# Patient Record
Sex: MALE | Race: WHITE | Employment: OTHER
[De-identification: names, ages, dates, MRNs, and addresses within clinical notes are randomized per-mention and may not be internally consistent; named-entity substitution may affect disease eponyms.]

---

## 2017-03-29 ENCOUNTER — TELEPHONE (OUTPATIENT)
Dept: CASE MANAGEMENT | Age: 77
End: 2017-03-29

## 2017-06-28 ENCOUNTER — TELEPHONE (OUTPATIENT)
Dept: CASE MANAGEMENT | Age: 77
End: 2017-06-28

## 2017-08-29 ENCOUNTER — TELEPHONE (OUTPATIENT)
Dept: CASE MANAGEMENT | Age: 77
End: 2017-08-29

## 2017-09-21 ENCOUNTER — TELEPHONE (OUTPATIENT)
Dept: CASE MANAGEMENT | Age: 77
End: 2017-09-21

## 2018-11-07 ENCOUNTER — HOSPITAL ENCOUNTER (EMERGENCY)
Age: 78
Discharge: HOME | End: 2018-11-07
Attending: EMERGENCY MEDICINE
Payer: MEDICARE

## 2018-11-07 ENCOUNTER — APPOINTMENT (OUTPATIENT)
Dept: GENERAL RADIOLOGY | Age: 78
End: 2018-11-07
Payer: MEDICARE

## 2018-11-07 VITALS
DIASTOLIC BLOOD PRESSURE: 58 MMHG | HEART RATE: 66 BPM | SYSTOLIC BLOOD PRESSURE: 130 MMHG | TEMPERATURE: 98.3 F | BODY MASS INDEX: 24.04 KG/M2 | HEIGHT: 67 IN | OXYGEN SATURATION: 94 % | RESPIRATION RATE: 20 BRPM | WEIGHT: 153.19 LBS

## 2018-11-07 DIAGNOSIS — F17.200 CURRENT EVERY DAY SMOKER: ICD-10-CM

## 2018-11-07 DIAGNOSIS — J06.9 ACUTE UPPER RESPIRATORY INFECTION: Primary | ICD-10-CM

## 2018-11-07 LAB
ALBUMIN SERPL-MCNC: 4 G/DL (ref 3.5–5.2)
ALP BLD-CCNC: 67 U/L (ref 40–129)
ALT SERPL-CCNC: 8 U/L (ref 0–40)
ANION GAP SERPL CALCULATED.3IONS-SCNC: 11 MMOL/L (ref 7–16)
AST SERPL-CCNC: 14 U/L (ref 0–39)
BASOPHILS ABSOLUTE: 0.03 E9/L (ref 0–0.2)
BASOPHILS RELATIVE PERCENT: 0.5 % (ref 0–2)
BILIRUB SERPL-MCNC: 0.3 MG/DL (ref 0–1.2)
BUN BLDV-MCNC: 14 MG/DL (ref 8–23)
CALCIUM SERPL-MCNC: 8.8 MG/DL (ref 8.6–10.2)
CHLORIDE BLD-SCNC: 99 MMOL/L (ref 98–107)
CO2: 26 MMOL/L (ref 22–29)
CREAT SERPL-MCNC: 1.5 MG/DL (ref 0.7–1.2)
EKG ATRIAL RATE: 92 BPM
EKG P AXIS: 60 DEGREES
EKG P-R INTERVAL: 164 MS
EKG Q-T INTERVAL: 348 MS
EKG QRS DURATION: 84 MS
EKG QTC CALCULATION (BAZETT): 430 MS
EKG R AXIS: 6 DEGREES
EKG T AXIS: 44 DEGREES
EKG VENTRICULAR RATE: 92 BPM
EOSINOPHILS ABSOLUTE: 0.01 E9/L (ref 0.05–0.5)
EOSINOPHILS RELATIVE PERCENT: 0.2 % (ref 0–6)
GFR AFRICAN AMERICAN: 55
GFR NON-AFRICAN AMERICAN: 45 ML/MIN/1.73
GLUCOSE BLD-MCNC: 125 MG/DL (ref 74–99)
HCT VFR BLD CALC: 46.3 % (ref 37–54)
HEMOGLOBIN: 15.6 G/DL (ref 12.5–16.5)
IMMATURE GRANULOCYTES #: 0.01 E9/L
IMMATURE GRANULOCYTES %: 0.2 % (ref 0–5)
LACTIC ACID: 1.1 MMOL/L (ref 0.5–2.2)
LYMPHOCYTES ABSOLUTE: 0.63 E9/L (ref 1.5–4)
LYMPHOCYTES RELATIVE PERCENT: 9.9 % (ref 20–42)
MCH RBC QN AUTO: 30.8 PG (ref 26–35)
MCHC RBC AUTO-ENTMCNC: 33.7 % (ref 32–34.5)
MCV RBC AUTO: 91.3 FL (ref 80–99.9)
MONOCYTES ABSOLUTE: 0.68 E9/L (ref 0.1–0.95)
MONOCYTES RELATIVE PERCENT: 10.7 % (ref 2–12)
NEUTROPHILS ABSOLUTE: 5.02 E9/L (ref 1.8–7.3)
NEUTROPHILS RELATIVE PERCENT: 78.5 % (ref 43–80)
PDW BLD-RTO: 12.7 FL (ref 11.5–15)
PLATELET # BLD: 192 E9/L (ref 130–450)
PMV BLD AUTO: 9.1 FL (ref 7–12)
POTASSIUM SERPL-SCNC: 3.8 MMOL/L (ref 3.5–5)
PRO-BNP: 405 PG/ML (ref 0–450)
RBC # BLD: 5.07 E12/L (ref 3.8–5.8)
SODIUM BLD-SCNC: 136 MMOL/L (ref 132–146)
TOTAL PROTEIN: 6.3 G/DL (ref 6.4–8.3)
TROPONIN: <0.01 NG/ML (ref 0–0.03)
WBC # BLD: 6.4 E9/L (ref 4.5–11.5)

## 2018-11-07 PROCEDURE — 84484 ASSAY OF TROPONIN QUANT: CPT

## 2018-11-07 PROCEDURE — 87040 BLOOD CULTURE FOR BACTERIA: CPT

## 2018-11-07 PROCEDURE — 80053 COMPREHEN METABOLIC PANEL: CPT

## 2018-11-07 PROCEDURE — 99285 EMERGENCY DEPT VISIT HI MDM: CPT

## 2018-11-07 PROCEDURE — 94664 DEMO&/EVAL PT USE INHALER: CPT

## 2018-11-07 PROCEDURE — 6360000002 HC RX W HCPCS: Performed by: EMERGENCY MEDICINE

## 2018-11-07 PROCEDURE — 83880 ASSAY OF NATRIURETIC PEPTIDE: CPT

## 2018-11-07 PROCEDURE — 6370000000 HC RX 637 (ALT 250 FOR IP): Performed by: EMERGENCY MEDICINE

## 2018-11-07 PROCEDURE — 96374 THER/PROPH/DIAG INJ IV PUSH: CPT

## 2018-11-07 PROCEDURE — 71045 X-RAY EXAM CHEST 1 VIEW: CPT

## 2018-11-07 PROCEDURE — 94640 AIRWAY INHALATION TREATMENT: CPT

## 2018-11-07 PROCEDURE — 2580000003 HC RX 258: Performed by: EMERGENCY MEDICINE

## 2018-11-07 PROCEDURE — 85025 COMPLETE CBC W/AUTO DIFF WBC: CPT

## 2018-11-07 PROCEDURE — 83605 ASSAY OF LACTIC ACID: CPT

## 2018-11-07 RX ORDER — IPRATROPIUM BROMIDE AND ALBUTEROL SULFATE 2.5; .5 MG/3ML; MG/3ML
1 SOLUTION RESPIRATORY (INHALATION)
Status: COMPLETED | OUTPATIENT
Start: 2018-11-07 | End: 2018-11-07

## 2018-11-07 RX ORDER — METHYLPREDNISOLONE SODIUM SUCCINATE 125 MG/2ML
125 INJECTION, POWDER, LYOPHILIZED, FOR SOLUTION INTRAMUSCULAR; INTRAVENOUS ONCE
Status: COMPLETED | OUTPATIENT
Start: 2018-11-07 | End: 2018-11-07

## 2018-11-07 RX ORDER — ALBUTEROL SULFATE 90 UG/1
2 AEROSOL, METERED RESPIRATORY (INHALATION) EVERY 4 HOURS PRN
Qty: 1 INHALER | Refills: 0 | Status: SHIPPED | OUTPATIENT
Start: 2018-11-07 | End: 2018-12-05

## 2018-11-07 RX ORDER — SODIUM CHLORIDE 0.9 % (FLUSH) 0.9 %
SYRINGE (ML) INJECTION
Status: DISCONTINUED
Start: 2018-11-07 | End: 2018-11-07 | Stop reason: HOSPADM

## 2018-11-07 RX ORDER — PREDNISONE 20 MG/1
40 TABLET ORAL DAILY
Qty: 10 TABLET | Refills: 0 | Status: SHIPPED | OUTPATIENT
Start: 2018-11-07 | End: 2018-11-12

## 2018-11-07 RX ORDER — DOXYCYCLINE HYCLATE 100 MG
100 TABLET ORAL 2 TIMES DAILY
Qty: 20 TABLET | Refills: 0 | Status: SHIPPED | OUTPATIENT
Start: 2018-11-07 | End: 2018-11-17

## 2018-11-07 RX ORDER — 0.9 % SODIUM CHLORIDE 0.9 %
500 INTRAVENOUS SOLUTION INTRAVENOUS ONCE
Status: COMPLETED | OUTPATIENT
Start: 2018-11-07 | End: 2018-11-07

## 2018-11-07 RX ADMIN — METHYLPREDNISOLONE SODIUM SUCCINATE 125 MG: 125 INJECTION, POWDER, FOR SOLUTION INTRAMUSCULAR; INTRAVENOUS at 12:01

## 2018-11-07 RX ADMIN — IPRATROPIUM BROMIDE AND ALBUTEROL SULFATE 1 AMPULE: .5; 3 SOLUTION RESPIRATORY (INHALATION) at 12:03

## 2018-11-07 RX ADMIN — IPRATROPIUM BROMIDE AND ALBUTEROL SULFATE 1 AMPULE: .5; 3 SOLUTION RESPIRATORY (INHALATION) at 12:02

## 2018-11-07 RX ADMIN — SODIUM CHLORIDE 500 ML: 9 INJECTION, SOLUTION INTRAVENOUS at 11:53

## 2018-11-07 RX ADMIN — IPRATROPIUM BROMIDE AND ALBUTEROL SULFATE 1 AMPULE: .5; 3 SOLUTION RESPIRATORY (INHALATION) at 12:04

## 2018-11-07 NOTE — ED PROVIDER NOTES
no known allergies. Physical Exam           ED Triage Vitals [11/07/18 1036]   BP Temp Temp Source Pulse Resp SpO2 Height Weight   104/76 98.9 °F (37.2 °C) Oral 90 14 94 % 5' 7\" (1.702 m) 153 lb 3 oz (69.5 kg)      Oxygen Saturation Interpretation: Normal.    Constitutional:  Alert, development consistent with age. HEENT:  NC/NT. Airway patent. Neck:  Normal ROM. Supple. Respiratory:  Breath sounds: equal bilaterally. Lung sounds: rhonchi- scattered. CV:  Regular rate and rhythm, normal heart sounds, without pathological murmurs, ectopy, gallops, or rubs. .  GI:  Abdomen Soft, nontender, good bowel sounds. No firm or pulsatile mass. Integument:  Normal turgor. Warm, dry, without visible rash. Lymphatic:  Edema:  non-pitting Bilateral lower extremity(s). Neurological:  Oriented. Motor functions intact.     Lab / Imaging Results   (All laboratory and radiology results have been personally reviewed by myself)  Labs:  Results for orders placed or performed during the hospital encounter of 11/07/18   CBC Auto Differential   Result Value Ref Range    WBC 6.4 4.5 - 11.5 E9/L    RBC 5.07 3.80 - 5.80 E12/L    Hemoglobin 15.6 12.5 - 16.5 g/dL    Hematocrit 46.3 37.0 - 54.0 %    MCV 91.3 80.0 - 99.9 fL    MCH 30.8 26.0 - 35.0 pg    MCHC 33.7 32.0 - 34.5 %    RDW 12.7 11.5 - 15.0 fL    Platelets 454 894 - 121 E9/L    MPV 9.1 7.0 - 12.0 fL    Neutrophils % 78.5 43.0 - 80.0 %    Immature Granulocytes % 0.2 0.0 - 5.0 %    Lymphocytes % 9.9 (L) 20.0 - 42.0 %    Monocytes % 10.7 2.0 - 12.0 %    Eosinophils % 0.2 0.0 - 6.0 %    Basophils % 0.5 0.0 - 2.0 %    Neutrophils # 5.02 1.80 - 7.30 E9/L    Immature Granulocytes # 0.01 E9/L    Lymphocytes # 0.63 (L) 1.50 - 4.00 E9/L    Monocytes # 0.68 0.10 - 0.95 E9/L    Eosinophils # 0.01 (L) 0.05 - 0.50 E9/L    Basophils # 0.03 0.00 - 0.20 E9/L   Comprehensive Metabolic Panel   Result Value Ref Range    Sodium 136 132 - 146 mmol/L    Potassium 3.8 3.5 - 5.0 mmol/L

## 2018-11-07 NOTE — ED NOTES
Discharged. Stable to exit with all belongings. States he is feeling much better.  Knows warning signs of when to return      Cole Pichardo RN  11/07/18 0718

## 2018-11-12 LAB — CULTURE, BLOOD 2: NORMAL

## 2019-03-05 ENCOUNTER — APPOINTMENT (OUTPATIENT)
Dept: CT IMAGING | Age: 79
DRG: 392 | End: 2019-03-05
Payer: MEDICARE

## 2019-03-05 ENCOUNTER — HOSPITAL ENCOUNTER (INPATIENT)
Age: 79
LOS: 2 days | Discharge: HOME | DRG: 392 | End: 2019-03-07
Attending: EMERGENCY MEDICINE | Admitting: INTERNAL MEDICINE
Payer: MEDICARE

## 2019-03-05 ENCOUNTER — APPOINTMENT (OUTPATIENT)
Dept: GENERAL RADIOLOGY | Age: 79
DRG: 392 | End: 2019-03-05
Payer: MEDICARE

## 2019-03-05 DIAGNOSIS — E86.0 DEHYDRATION: Primary | ICD-10-CM

## 2019-03-05 DIAGNOSIS — R19.7 NAUSEA VOMITING AND DIARRHEA: ICD-10-CM

## 2019-03-05 DIAGNOSIS — R11.2 NAUSEA VOMITING AND DIARRHEA: ICD-10-CM

## 2019-03-05 DIAGNOSIS — R42 ORTHOSTATIC DIZZINESS: ICD-10-CM

## 2019-03-05 PROBLEM — N18.30 CKD (CHRONIC KIDNEY DISEASE) STAGE 3, GFR 30-59 ML/MIN (HCC): Chronic | Status: ACTIVE | Noted: 2019-03-05

## 2019-03-05 PROBLEM — K52.9 GASTROENTERITIS: Status: ACTIVE | Noted: 2019-03-05

## 2019-03-05 PROBLEM — E03.9 ACQUIRED HYPOTHYROIDISM: Chronic | Status: ACTIVE | Noted: 2019-03-05

## 2019-03-05 LAB
ALBUMIN SERPL-MCNC: 4.2 G/DL (ref 3.5–5.2)
ALP BLD-CCNC: 73 U/L (ref 40–129)
ALT SERPL-CCNC: 9 U/L (ref 0–40)
ANION GAP SERPL CALCULATED.3IONS-SCNC: 12 MMOL/L (ref 7–16)
AST SERPL-CCNC: 17 U/L (ref 0–39)
BACTERIA: ABNORMAL /HPF
BASOPHILS ABSOLUTE: 0.02 E9/L (ref 0–0.2)
BASOPHILS RELATIVE PERCENT: 0.3 % (ref 0–2)
BILIRUB SERPL-MCNC: 0.7 MG/DL (ref 0–1.2)
BILIRUBIN URINE: NEGATIVE
BLOOD, URINE: ABNORMAL
BUN BLDV-MCNC: 19 MG/DL (ref 8–23)
CALCIUM SERPL-MCNC: 8.8 MG/DL (ref 8.6–10.2)
CHLORIDE BLD-SCNC: 100 MMOL/L (ref 98–107)
CLARITY: CLEAR
CO2: 24 MMOL/L (ref 22–29)
COLOR: YELLOW
CREAT SERPL-MCNC: 1.4 MG/DL (ref 0.7–1.2)
EOSINOPHILS ABSOLUTE: 0.01 E9/L (ref 0.05–0.5)
EOSINOPHILS RELATIVE PERCENT: 0.1 % (ref 0–6)
GFR AFRICAN AMERICAN: 59
GFR NON-AFRICAN AMERICAN: 49 ML/MIN/1.73
GLUCOSE BLD-MCNC: 140 MG/DL (ref 74–99)
GLUCOSE URINE: NEGATIVE MG/DL
HCT VFR BLD CALC: 48.7 % (ref 37–54)
HEMOGLOBIN: 16.2 G/DL (ref 12.5–16.5)
IMMATURE GRANULOCYTES #: 0.05 E9/L
IMMATURE GRANULOCYTES %: 0.6 % (ref 0–5)
INFLUENZA A BY PCR: NOT DETECTED
INFLUENZA B BY PCR: NOT DETECTED
INR BLD: 1
KETONES, URINE: NEGATIVE MG/DL
LACTIC ACID: 1.1 MMOL/L (ref 0.5–2.2)
LEUKOCYTE ESTERASE, URINE: NEGATIVE
LIPASE: 18 U/L (ref 13–60)
LYMPHOCYTES ABSOLUTE: 0.55 E9/L (ref 1.5–4)
LYMPHOCYTES RELATIVE PERCENT: 6.9 % (ref 20–42)
MCH RBC QN AUTO: 30.3 PG (ref 26–35)
MCHC RBC AUTO-ENTMCNC: 33.3 % (ref 32–34.5)
MCV RBC AUTO: 91.2 FL (ref 80–99.9)
MONOCYTES ABSOLUTE: 0.44 E9/L (ref 0.1–0.95)
MONOCYTES RELATIVE PERCENT: 5.5 % (ref 2–12)
NEUTROPHILS ABSOLUTE: 6.92 E9/L (ref 1.8–7.3)
NEUTROPHILS RELATIVE PERCENT: 86.6 % (ref 43–80)
NITRITE, URINE: NEGATIVE
PDW BLD-RTO: 13.4 FL (ref 11.5–15)
PH UA: 5.5 (ref 5–9)
PLATELET # BLD: 237 E9/L (ref 130–450)
PMV BLD AUTO: 9 FL (ref 7–12)
POTASSIUM REFLEX MAGNESIUM: 3.7 MMOL/L (ref 3.5–5)
PROTEIN UA: NEGATIVE MG/DL
PROTHROMBIN TIME: 11.2 SEC (ref 9.3–12.4)
RBC # BLD: 5.34 E12/L (ref 3.8–5.8)
RBC # BLD: NORMAL 10*6/UL
RBC UA: ABNORMAL /HPF (ref 0–2)
SODIUM BLD-SCNC: 136 MMOL/L (ref 132–146)
SPECIFIC GRAVITY UA: 1.02 (ref 1–1.03)
TOTAL PROTEIN: 6.7 G/DL (ref 6.4–8.3)
TROPONIN: <0.01 NG/ML (ref 0–0.03)
UROBILINOGEN, URINE: 0.2 E.U./DL
WBC # BLD: 8 E9/L (ref 4.5–11.5)
WBC UA: ABNORMAL /HPF (ref 0–5)

## 2019-03-05 PROCEDURE — 83605 ASSAY OF LACTIC ACID: CPT

## 2019-03-05 PROCEDURE — 36415 COLL VENOUS BLD VENIPUNCTURE: CPT

## 2019-03-05 PROCEDURE — 85610 PROTHROMBIN TIME: CPT

## 2019-03-05 PROCEDURE — 87040 BLOOD CULTURE FOR BACTERIA: CPT

## 2019-03-05 PROCEDURE — 84484 ASSAY OF TROPONIN QUANT: CPT

## 2019-03-05 PROCEDURE — 2580000003 HC RX 258: Performed by: STUDENT IN AN ORGANIZED HEALTH CARE EDUCATION/TRAINING PROGRAM

## 2019-03-05 PROCEDURE — 80053 COMPREHEN METABOLIC PANEL: CPT

## 2019-03-05 PROCEDURE — 96361 HYDRATE IV INFUSION ADD-ON: CPT

## 2019-03-05 PROCEDURE — 99285 EMERGENCY DEPT VISIT HI MDM: CPT

## 2019-03-05 PROCEDURE — 74177 CT ABD & PELVIS W/CONTRAST: CPT

## 2019-03-05 PROCEDURE — 6370000000 HC RX 637 (ALT 250 FOR IP): Performed by: INTERNAL MEDICINE

## 2019-03-05 PROCEDURE — 81001 URINALYSIS AUTO W/SCOPE: CPT

## 2019-03-05 PROCEDURE — 87502 INFLUENZA DNA AMP PROBE: CPT

## 2019-03-05 PROCEDURE — 96360 HYDRATION IV INFUSION INIT: CPT

## 2019-03-05 PROCEDURE — 83690 ASSAY OF LIPASE: CPT

## 2019-03-05 PROCEDURE — 2580000003 HC RX 258: Performed by: INTERNAL MEDICINE

## 2019-03-05 PROCEDURE — 85025 COMPLETE CBC W/AUTO DIFF WBC: CPT

## 2019-03-05 PROCEDURE — 71045 X-RAY EXAM CHEST 1 VIEW: CPT

## 2019-03-05 PROCEDURE — 93005 ELECTROCARDIOGRAM TRACING: CPT | Performed by: STUDENT IN AN ORGANIZED HEALTH CARE EDUCATION/TRAINING PROGRAM

## 2019-03-05 PROCEDURE — 1200000000 HC SEMI PRIVATE

## 2019-03-05 PROCEDURE — 6360000004 HC RX CONTRAST MEDICATION: Performed by: RADIOLOGY

## 2019-03-05 RX ORDER — VARENICLINE TARTRATE 0.5 MG/1
1 TABLET, FILM COATED ORAL 2 TIMES DAILY
Status: DISCONTINUED | OUTPATIENT
Start: 2019-03-05 | End: 2019-03-07 | Stop reason: HOSPADM

## 2019-03-05 RX ORDER — SODIUM CHLORIDE 0.9 % (FLUSH) 0.9 %
10 SYRINGE (ML) INJECTION PRN
Status: DISCONTINUED | OUTPATIENT
Start: 2019-03-05 | End: 2019-03-07 | Stop reason: HOSPADM

## 2019-03-05 RX ORDER — DOXAZOSIN MESYLATE 4 MG/1
4 TABLET ORAL DAILY
Status: DISCONTINUED | OUTPATIENT
Start: 2019-03-06 | End: 2019-03-06

## 2019-03-05 RX ORDER — 0.9 % SODIUM CHLORIDE 0.9 %
30 INTRAVENOUS SOLUTION INTRAVENOUS ONCE
Status: COMPLETED | OUTPATIENT
Start: 2019-03-05 | End: 2019-03-05

## 2019-03-05 RX ORDER — ONDANSETRON 2 MG/ML
4 INJECTION INTRAMUSCULAR; INTRAVENOUS EVERY 6 HOURS PRN
Status: DISCONTINUED | OUTPATIENT
Start: 2019-03-05 | End: 2019-03-07 | Stop reason: HOSPADM

## 2019-03-05 RX ORDER — SODIUM CHLORIDE 9 MG/ML
INJECTION, SOLUTION INTRAVENOUS ONCE
Status: COMPLETED | OUTPATIENT
Start: 2019-03-05 | End: 2019-03-05

## 2019-03-05 RX ORDER — ACETAMINOPHEN 325 MG/1
650 TABLET ORAL EVERY 4 HOURS PRN
Status: DISCONTINUED | OUTPATIENT
Start: 2019-03-05 | End: 2019-03-07 | Stop reason: HOSPADM

## 2019-03-05 RX ORDER — LEVOTHYROXINE SODIUM 0.05 MG/1
50 TABLET ORAL DAILY
Status: DISCONTINUED | OUTPATIENT
Start: 2019-03-06 | End: 2019-03-07 | Stop reason: HOSPADM

## 2019-03-05 RX ORDER — ASPIRIN 81 MG/1
81 TABLET ORAL DAILY
Status: DISCONTINUED | OUTPATIENT
Start: 2019-03-06 | End: 2019-03-07 | Stop reason: HOSPADM

## 2019-03-05 RX ORDER — SODIUM CHLORIDE 0.9 % (FLUSH) 0.9 %
10 SYRINGE (ML) INJECTION EVERY 12 HOURS SCHEDULED
Status: DISCONTINUED | OUTPATIENT
Start: 2019-03-05 | End: 2019-03-07 | Stop reason: HOSPADM

## 2019-03-05 RX ORDER — SODIUM CHLORIDE 9 MG/ML
INJECTION, SOLUTION INTRAVENOUS CONTINUOUS
Status: ACTIVE | OUTPATIENT
Start: 2019-03-05 | End: 2019-03-06

## 2019-03-05 RX ADMIN — SODIUM CHLORIDE: 9 INJECTION, SOLUTION INTRAVENOUS at 20:40

## 2019-03-05 RX ADMIN — IOPAMIDOL 110 ML: 755 INJECTION, SOLUTION INTRAVENOUS at 19:28

## 2019-03-05 RX ADMIN — SODIUM CHLORIDE: 9 INJECTION, SOLUTION INTRAVENOUS at 23:02

## 2019-03-05 RX ADMIN — SODIUM CHLORIDE 2028 ML: 9 INJECTION, SOLUTION INTRAVENOUS at 17:43

## 2019-03-05 ASSESSMENT — ENCOUNTER SYMPTOMS
CONSTIPATION: 0
DIARRHEA: 1
VOMITING: 1
WHEEZING: 0
NAUSEA: 1
COLOR CHANGE: 0
ABDOMINAL PAIN: 0
SHORTNESS OF BREATH: 0
COUGH: 1

## 2019-03-05 ASSESSMENT — PAIN SCALES - GENERAL: PAINLEVEL_OUTOF10: 0

## 2019-03-06 PROBLEM — N17.9 AKI (ACUTE KIDNEY INJURY) (HCC): Status: ACTIVE | Noted: 2019-03-06

## 2019-03-06 PROBLEM — I73.9 PVD (PERIPHERAL VASCULAR DISEASE) (HCC): Chronic | Status: ACTIVE | Noted: 2019-03-06

## 2019-03-06 LAB
ALBUMIN SERPL-MCNC: 3.4 G/DL (ref 3.5–5.2)
ALP BLD-CCNC: 54 U/L (ref 40–129)
ALT SERPL-CCNC: 8 U/L (ref 0–40)
ANION GAP SERPL CALCULATED.3IONS-SCNC: 8 MMOL/L (ref 7–16)
AST SERPL-CCNC: 13 U/L (ref 0–39)
BASOPHILS ABSOLUTE: 0.02 E9/L (ref 0–0.2)
BASOPHILS RELATIVE PERCENT: 0.4 % (ref 0–2)
BILIRUB SERPL-MCNC: 0.4 MG/DL (ref 0–1.2)
BUN BLDV-MCNC: 14 MG/DL (ref 8–23)
C DIFFICILE TOXIN, EIA: NORMAL
CALCIUM SERPL-MCNC: 8 MG/DL (ref 8.6–10.2)
CHLORIDE BLD-SCNC: 108 MMOL/L (ref 98–107)
CO2: 24 MMOL/L (ref 22–29)
CREAT SERPL-MCNC: 1.3 MG/DL (ref 0.7–1.2)
EOSINOPHILS ABSOLUTE: 0.01 E9/L (ref 0.05–0.5)
EOSINOPHILS RELATIVE PERCENT: 0.2 % (ref 0–6)
GFR AFRICAN AMERICAN: >60
GFR NON-AFRICAN AMERICAN: 53 ML/MIN/1.73
GLUCOSE BLD-MCNC: 102 MG/DL (ref 74–99)
HCT VFR BLD CALC: 43.1 % (ref 37–54)
HEMOGLOBIN: 14.1 G/DL (ref 12.5–16.5)
IMMATURE GRANULOCYTES #: 0.03 E9/L
IMMATURE GRANULOCYTES %: 0.6 % (ref 0–5)
LYMPHOCYTES ABSOLUTE: 0.91 E9/L (ref 1.5–4)
LYMPHOCYTES RELATIVE PERCENT: 17.2 % (ref 20–42)
MAGNESIUM: 1.8 MG/DL (ref 1.6–2.6)
MCH RBC QN AUTO: 30.5 PG (ref 26–35)
MCHC RBC AUTO-ENTMCNC: 32.7 % (ref 32–34.5)
MCV RBC AUTO: 93.1 FL (ref 80–99.9)
MONOCYTES ABSOLUTE: 0.55 E9/L (ref 0.1–0.95)
MONOCYTES RELATIVE PERCENT: 10.4 % (ref 2–12)
NEUTROPHILS ABSOLUTE: 3.78 E9/L (ref 1.8–7.3)
NEUTROPHILS RELATIVE PERCENT: 71.2 % (ref 43–80)
PDW BLD-RTO: 13.2 FL (ref 11.5–15)
PLATELET # BLD: 188 E9/L (ref 130–450)
PMV BLD AUTO: 9.1 FL (ref 7–12)
POTASSIUM SERPL-SCNC: 3.8 MMOL/L (ref 3.5–5)
RBC # BLD: 4.63 E12/L (ref 3.8–5.8)
SODIUM BLD-SCNC: 140 MMOL/L (ref 132–146)
TOTAL PROTEIN: 5.3 G/DL (ref 6.4–8.3)
WBC # BLD: 5.3 E9/L (ref 4.5–11.5)

## 2019-03-06 PROCEDURE — 6360000002 HC RX W HCPCS: Performed by: INTERNAL MEDICINE

## 2019-03-06 PROCEDURE — 6370000000 HC RX 637 (ALT 250 FOR IP): Performed by: INTERNAL MEDICINE

## 2019-03-06 PROCEDURE — 2580000003 HC RX 258: Performed by: INTERNAL MEDICINE

## 2019-03-06 PROCEDURE — 80053 COMPREHEN METABOLIC PANEL: CPT

## 2019-03-06 PROCEDURE — 97165 OT EVAL LOW COMPLEX 30 MIN: CPT

## 2019-03-06 PROCEDURE — 87324 CLOSTRIDIUM AG IA: CPT

## 2019-03-06 PROCEDURE — 83735 ASSAY OF MAGNESIUM: CPT

## 2019-03-06 PROCEDURE — 85025 COMPLETE CBC W/AUTO DIFF WBC: CPT

## 2019-03-06 PROCEDURE — 36415 COLL VENOUS BLD VENIPUNCTURE: CPT

## 2019-03-06 PROCEDURE — 1200000000 HC SEMI PRIVATE

## 2019-03-06 PROCEDURE — 97161 PT EVAL LOW COMPLEX 20 MIN: CPT

## 2019-03-06 RX ORDER — LOPERAMIDE HYDROCHLORIDE 2 MG/1
2 CAPSULE ORAL 4 TIMES DAILY PRN
Status: DISCONTINUED | OUTPATIENT
Start: 2019-03-06 | End: 2019-03-07 | Stop reason: HOSPADM

## 2019-03-06 RX ORDER — LOPERAMIDE HYDROCHLORIDE 2 MG/1
2 CAPSULE ORAL ONCE
Status: COMPLETED | OUTPATIENT
Start: 2019-03-06 | End: 2019-03-06

## 2019-03-06 RX ADMIN — VARENICLINE TARTRATE 1 MG: 0.5 TABLET, FILM COATED ORAL at 08:35

## 2019-03-06 RX ADMIN — ASPIRIN 81 MG: 81 TABLET, COATED ORAL at 08:35

## 2019-03-06 RX ADMIN — LEVOTHYROXINE SODIUM 50 MCG: 50 TABLET ORAL at 06:17

## 2019-03-06 RX ADMIN — SODIUM CHLORIDE: 9 INJECTION, SOLUTION INTRAVENOUS at 22:08

## 2019-03-06 RX ADMIN — ENOXAPARIN SODIUM 40 MG: 40 INJECTION SUBCUTANEOUS at 08:35

## 2019-03-06 RX ADMIN — DOXAZOSIN MESYLATE 4 MG: 4 TABLET ORAL at 08:35

## 2019-03-06 RX ADMIN — LOPERAMIDE HYDROCHLORIDE 2 MG: 2 CAPSULE ORAL at 15:17

## 2019-03-06 RX ADMIN — VARENICLINE TARTRATE 1 MG: 0.5 TABLET, FILM COATED ORAL at 22:06

## 2019-03-06 ASSESSMENT — PAIN SCALES - GENERAL
PAINLEVEL_OUTOF10: 0

## 2019-03-07 VITALS
HEART RATE: 63 BPM | WEIGHT: 160.2 LBS | SYSTOLIC BLOOD PRESSURE: 114 MMHG | OXYGEN SATURATION: 94 % | RESPIRATION RATE: 16 BRPM | HEIGHT: 67 IN | TEMPERATURE: 97.6 F | BODY MASS INDEX: 25.15 KG/M2 | DIASTOLIC BLOOD PRESSURE: 72 MMHG

## 2019-03-07 PROBLEM — K52.9 GASTROENTERITIS: Status: RESOLVED | Noted: 2019-03-05 | Resolved: 2019-03-07

## 2019-03-07 PROBLEM — N17.9 AKI (ACUTE KIDNEY INJURY) (HCC): Status: RESOLVED | Noted: 2019-03-06 | Resolved: 2019-03-07

## 2019-03-07 LAB
ANION GAP SERPL CALCULATED.3IONS-SCNC: 8 MMOL/L (ref 7–16)
BUN BLDV-MCNC: 8 MG/DL (ref 8–23)
CALCIUM SERPL-MCNC: 8.2 MG/DL (ref 8.6–10.2)
CHLORIDE BLD-SCNC: 110 MMOL/L (ref 98–107)
CO2: 23 MMOL/L (ref 22–29)
CREAT SERPL-MCNC: 1.2 MG/DL (ref 0.7–1.2)
EKG ATRIAL RATE: 99 BPM
EKG P AXIS: 43 DEGREES
EKG P-R INTERVAL: 146 MS
EKG Q-T INTERVAL: 338 MS
EKG QRS DURATION: 88 MS
EKG QTC CALCULATION (BAZETT): 433 MS
EKG R AXIS: -6 DEGREES
EKG T AXIS: 28 DEGREES
EKG VENTRICULAR RATE: 99 BPM
GFR AFRICAN AMERICAN: >60
GFR NON-AFRICAN AMERICAN: 59 ML/MIN/1.73
GLUCOSE BLD-MCNC: 98 MG/DL (ref 74–99)
POTASSIUM SERPL-SCNC: 3.7 MMOL/L (ref 3.5–5)
SODIUM BLD-SCNC: 141 MMOL/L (ref 132–146)

## 2019-03-07 PROCEDURE — 80048 BASIC METABOLIC PNL TOTAL CA: CPT

## 2019-03-07 PROCEDURE — 36415 COLL VENOUS BLD VENIPUNCTURE: CPT

## 2019-03-07 PROCEDURE — 6370000000 HC RX 637 (ALT 250 FOR IP): Performed by: INTERNAL MEDICINE

## 2019-03-07 PROCEDURE — 2580000003 HC RX 258: Performed by: INTERNAL MEDICINE

## 2019-03-07 PROCEDURE — 6360000002 HC RX W HCPCS: Performed by: INTERNAL MEDICINE

## 2019-03-07 RX ORDER — ASPIRIN 81 MG/1
81 TABLET ORAL DAILY
COMMUNITY
Start: 2019-03-07 | End: 2019-06-18 | Stop reason: ALTCHOICE

## 2019-03-07 RX ADMIN — VARENICLINE TARTRATE 1 MG: 0.5 TABLET, FILM COATED ORAL at 08:39

## 2019-03-07 RX ADMIN — Medication 10 ML: at 08:39

## 2019-03-07 RX ADMIN — ASPIRIN 81 MG: 81 TABLET, COATED ORAL at 08:39

## 2019-03-07 RX ADMIN — LEVOTHYROXINE SODIUM 50 MCG: 50 TABLET ORAL at 06:41

## 2019-03-07 RX ADMIN — ENOXAPARIN SODIUM 40 MG: 40 INJECTION SUBCUTANEOUS at 08:39

## 2019-03-07 ASSESSMENT — PAIN SCALES - GENERAL
PAINLEVEL_OUTOF10: 0
PAINLEVEL_OUTOF10: 0

## 2019-03-10 LAB
BLOOD CULTURE, ROUTINE: NORMAL
CULTURE, BLOOD 2: NORMAL

## 2019-06-18 ENCOUNTER — APPOINTMENT (OUTPATIENT)
Dept: MRI IMAGING | Age: 79
DRG: 072 | End: 2019-06-18
Payer: MEDICARE

## 2019-06-18 ENCOUNTER — APPOINTMENT (OUTPATIENT)
Dept: GENERAL RADIOLOGY | Age: 79
DRG: 072 | End: 2019-06-18
Payer: MEDICARE

## 2019-06-18 ENCOUNTER — HOSPITAL ENCOUNTER (INPATIENT)
Age: 79
LOS: 2 days | Discharge: HOME | DRG: 072 | End: 2019-06-20
Attending: EMERGENCY MEDICINE | Admitting: INTERNAL MEDICINE
Payer: MEDICARE

## 2019-06-18 ENCOUNTER — APPOINTMENT (OUTPATIENT)
Dept: CT IMAGING | Age: 79
DRG: 072 | End: 2019-06-18
Payer: MEDICARE

## 2019-06-18 DIAGNOSIS — N18.9 CHRONIC KIDNEY DISEASE, UNSPECIFIED CKD STAGE: ICD-10-CM

## 2019-06-18 DIAGNOSIS — R41.82 ALTERED MENTAL STATUS, UNSPECIFIED ALTERED MENTAL STATUS TYPE: Primary | ICD-10-CM

## 2019-06-18 DIAGNOSIS — A41.9 SEPTICEMIA (HCC): ICD-10-CM

## 2019-06-18 DIAGNOSIS — N17.9 ACUTE KIDNEY INJURY (HCC): ICD-10-CM

## 2019-06-18 PROBLEM — N18.30 CKD (CHRONIC KIDNEY DISEASE) STAGE 3, GFR 30-59 ML/MIN (HCC): Chronic | Status: ACTIVE | Noted: 2019-06-18

## 2019-06-18 PROBLEM — G93.41 METABOLIC ENCEPHALOPATHY: Status: ACTIVE | Noted: 2019-06-18

## 2019-06-18 PROBLEM — B34.8 RHINOVIRUS INFECTION: Status: ACTIVE | Noted: 2019-06-18

## 2019-06-18 LAB
ALBUMIN SERPL-MCNC: 4.1 G/DL (ref 3.5–5.2)
ALP BLD-CCNC: 72 U/L (ref 40–129)
ALT SERPL-CCNC: 8 U/L (ref 0–40)
ANION GAP SERPL CALCULATED.3IONS-SCNC: 10 MMOL/L (ref 7–16)
AST SERPL-CCNC: 12 U/L (ref 0–39)
BACTERIA: NORMAL /HPF
BASOPHILS ABSOLUTE: 0.04 E9/L (ref 0–0.2)
BASOPHILS RELATIVE PERCENT: 0.5 % (ref 0–2)
BILIRUB SERPL-MCNC: 0.5 MG/DL (ref 0–1.2)
BILIRUBIN URINE: NEGATIVE
BLOOD, URINE: NORMAL
BUN BLDV-MCNC: 14 MG/DL (ref 8–23)
CALCIUM SERPL-MCNC: 8.7 MG/DL (ref 8.6–10.2)
CHLORIDE BLD-SCNC: 102 MMOL/L (ref 98–107)
CLARITY: CLEAR
CO2: 25 MMOL/L (ref 22–29)
COLOR: YELLOW
CREAT SERPL-MCNC: 1.5 MG/DL (ref 0.7–1.2)
EKG ATRIAL RATE: 81 BPM
EKG P AXIS: 47 DEGREES
EKG P-R INTERVAL: 154 MS
EKG Q-T INTERVAL: 336 MS
EKG QRS DURATION: 86 MS
EKG QTC CALCULATION (BAZETT): 390 MS
EKG R AXIS: 1 DEGREES
EKG T AXIS: 28 DEGREES
EKG VENTRICULAR RATE: 81 BPM
EOSINOPHILS ABSOLUTE: 0.06 E9/L (ref 0.05–0.5)
EOSINOPHILS RELATIVE PERCENT: 0.7 % (ref 0–6)
FILM ARRAY ADENOVIRUS: ABNORMAL
FILM ARRAY BORDETELLA PERTUSSIS: ABNORMAL
FILM ARRAY CHLAMYDOPHILIA PNEUMONIAE: ABNORMAL
FILM ARRAY CORONAVIRUS 229E: ABNORMAL
FILM ARRAY CORONAVIRUS HKU1: ABNORMAL
FILM ARRAY CORONAVIRUS NL63: ABNORMAL
FILM ARRAY CORONAVIRUS OC43: ABNORMAL
FILM ARRAY INFLUENZA A VIRUS 09H1: ABNORMAL
FILM ARRAY INFLUENZA A VIRUS H1: ABNORMAL
FILM ARRAY INFLUENZA A VIRUS H3: ABNORMAL
FILM ARRAY INFLUENZA A VIRUS: ABNORMAL
FILM ARRAY INFLUENZA B: ABNORMAL
FILM ARRAY METAPNEUMOVIRUS: ABNORMAL
FILM ARRAY MYCOPLASMA PNEUMONIAE: ABNORMAL
FILM ARRAY PARAINFLUENZA VIRUS 1: ABNORMAL
FILM ARRAY PARAINFLUENZA VIRUS 2: ABNORMAL
FILM ARRAY PARAINFLUENZA VIRUS 3: ABNORMAL
FILM ARRAY PARAINFLUENZA VIRUS 4: ABNORMAL
FILM ARRAY RESPIRATORY SYNCITIAL VIRUS: ABNORMAL
GFR AFRICAN AMERICAN: 55
GFR NON-AFRICAN AMERICAN: 45 ML/MIN/1.73
GLUCOSE BLD-MCNC: 110 MG/DL (ref 74–99)
GLUCOSE URINE: NEGATIVE MG/DL
HCT VFR BLD CALC: 46 % (ref 37–54)
HEMOGLOBIN: 15.2 G/DL (ref 12.5–16.5)
IMMATURE GRANULOCYTES #: 0.04 E9/L
IMMATURE GRANULOCYTES %: 0.5 % (ref 0–5)
INR BLD: 1
KETONES, URINE: NEGATIVE MG/DL
LACTIC ACID: 1.1 MMOL/L (ref 0.5–2.2)
LEUKOCYTE ESTERASE, URINE: NEGATIVE
LIPASE: 19 U/L (ref 13–60)
LYMPHOCYTES ABSOLUTE: 0.62 E9/L (ref 1.5–4)
LYMPHOCYTES RELATIVE PERCENT: 7.3 % (ref 20–42)
MCH RBC QN AUTO: 29.7 PG (ref 26–35)
MCHC RBC AUTO-ENTMCNC: 33 % (ref 32–34.5)
MCV RBC AUTO: 90 FL (ref 80–99.9)
MONOCYTES ABSOLUTE: 0.62 E9/L (ref 0.1–0.95)
MONOCYTES RELATIVE PERCENT: 7.3 % (ref 2–12)
MUCUS: PRESENT
NEUTROPHILS ABSOLUTE: 7.07 E9/L (ref 1.8–7.3)
NEUTROPHILS RELATIVE PERCENT: 83.7 % (ref 43–80)
NITRITE, URINE: NEGATIVE
ORGANISM: ABNORMAL
PDW BLD-RTO: 13.2 FL (ref 11.5–15)
PH UA: 5.5 (ref 5–9)
PLATELET # BLD: 199 E9/L (ref 130–450)
PMV BLD AUTO: 9 FL (ref 7–12)
POTASSIUM REFLEX MAGNESIUM: 4.4 MMOL/L (ref 3.5–5)
PROTEIN UA: NEGATIVE MG/DL
PROTHROMBIN TIME: 10.8 SEC (ref 9.3–12.4)
RBC # BLD: 5.11 E12/L (ref 3.8–5.8)
RBC UA: NORMAL /HPF (ref 0–2)
SODIUM BLD-SCNC: 137 MMOL/L (ref 132–146)
SPECIFIC GRAVITY UA: 1.02 (ref 1–1.03)
TOTAL PROTEIN: 6.1 G/DL (ref 6.4–8.3)
TROPONIN: <0.01 NG/ML (ref 0–0.03)
UROBILINOGEN, URINE: 0.2 E.U./DL
WBC # BLD: 8.5 E9/L (ref 4.5–11.5)
WBC UA: NORMAL /HPF (ref 0–5)

## 2019-06-18 PROCEDURE — 87088 URINE BACTERIA CULTURE: CPT

## 2019-06-18 PROCEDURE — 87486 CHLMYD PNEUM DNA AMP PROBE: CPT

## 2019-06-18 PROCEDURE — 2580000003 HC RX 258: Performed by: INTERNAL MEDICINE

## 2019-06-18 PROCEDURE — 83605 ASSAY OF LACTIC ACID: CPT

## 2019-06-18 PROCEDURE — 87040 BLOOD CULTURE FOR BACTERIA: CPT

## 2019-06-18 PROCEDURE — 87798 DETECT AGENT NOS DNA AMP: CPT

## 2019-06-18 PROCEDURE — 2580000003 HC RX 258: Performed by: EMERGENCY MEDICINE

## 2019-06-18 PROCEDURE — 93005 ELECTROCARDIOGRAM TRACING: CPT | Performed by: EMERGENCY MEDICINE

## 2019-06-18 PROCEDURE — 6370000000 HC RX 637 (ALT 250 FOR IP): Performed by: INTERNAL MEDICINE

## 2019-06-18 PROCEDURE — 81001 URINALYSIS AUTO W/SCOPE: CPT

## 2019-06-18 PROCEDURE — 36415 COLL VENOUS BLD VENIPUNCTURE: CPT

## 2019-06-18 PROCEDURE — 6360000002 HC RX W HCPCS: Performed by: EMERGENCY MEDICINE

## 2019-06-18 PROCEDURE — 2500000003 HC RX 250 WO HCPCS: Performed by: INTERNAL MEDICINE

## 2019-06-18 PROCEDURE — 71045 X-RAY EXAM CHEST 1 VIEW: CPT

## 2019-06-18 PROCEDURE — 87581 M.PNEUMON DNA AMP PROBE: CPT

## 2019-06-18 PROCEDURE — 70450 CT HEAD/BRAIN W/O DYE: CPT

## 2019-06-18 PROCEDURE — 84484 ASSAY OF TROPONIN QUANT: CPT

## 2019-06-18 PROCEDURE — 83690 ASSAY OF LIPASE: CPT

## 2019-06-18 PROCEDURE — 80053 COMPREHEN METABOLIC PANEL: CPT

## 2019-06-18 PROCEDURE — 87633 RESP VIRUS 12-25 TARGETS: CPT

## 2019-06-18 PROCEDURE — 6360000002 HC RX W HCPCS: Performed by: INTERNAL MEDICINE

## 2019-06-18 PROCEDURE — 94760 N-INVAS EAR/PLS OXIMETRY 1: CPT

## 2019-06-18 PROCEDURE — 70551 MRI BRAIN STEM W/O DYE: CPT

## 2019-06-18 PROCEDURE — 93010 ELECTROCARDIOGRAM REPORT: CPT | Performed by: INTERNAL MEDICINE

## 2019-06-18 PROCEDURE — 85025 COMPLETE CBC W/AUTO DIFF WBC: CPT

## 2019-06-18 PROCEDURE — 6370000000 HC RX 637 (ALT 250 FOR IP): Performed by: EMERGENCY MEDICINE

## 2019-06-18 PROCEDURE — 2060000000 HC ICU INTERMEDIATE R&B

## 2019-06-18 PROCEDURE — 99285 EMERGENCY DEPT VISIT HI MDM: CPT

## 2019-06-18 PROCEDURE — 85610 PROTHROMBIN TIME: CPT

## 2019-06-18 RX ORDER — ASPIRIN 81 MG/1
81 TABLET ORAL DAILY
Status: DISCONTINUED | OUTPATIENT
Start: 2019-06-18 | End: 2019-06-20 | Stop reason: HOSPADM

## 2019-06-18 RX ORDER — SODIUM CHLORIDE 9 MG/ML
INJECTION, SOLUTION INTRAVENOUS CONTINUOUS
Status: ACTIVE | OUTPATIENT
Start: 2019-06-18 | End: 2019-06-19

## 2019-06-18 RX ORDER — DOXAZOSIN MESYLATE 4 MG/1
4 TABLET ORAL DAILY
Status: DISCONTINUED | OUTPATIENT
Start: 2019-06-18 | End: 2019-06-20 | Stop reason: HOSPADM

## 2019-06-18 RX ORDER — LEVOTHYROXINE SODIUM 0.05 MG/1
50 TABLET ORAL EVERY MORNING
Status: DISCONTINUED | OUTPATIENT
Start: 2019-06-18 | End: 2019-06-20 | Stop reason: HOSPADM

## 2019-06-18 RX ORDER — SODIUM CHLORIDE, SODIUM LACTATE, POTASSIUM CHLORIDE, AND CALCIUM CHLORIDE .6; .31; .03; .02 G/100ML; G/100ML; G/100ML; G/100ML
30 INJECTION, SOLUTION INTRAVENOUS ONCE
Status: DISCONTINUED | OUTPATIENT
Start: 2019-06-18 | End: 2019-06-18

## 2019-06-18 RX ORDER — NICOTINE 21 MG/24HR
1 PATCH, TRANSDERMAL 24 HOURS TRANSDERMAL DAILY
Status: DISCONTINUED | OUTPATIENT
Start: 2019-06-18 | End: 2019-06-20 | Stop reason: HOSPADM

## 2019-06-18 RX ORDER — ACETAMINOPHEN 500 MG
1000 TABLET ORAL ONCE
Status: COMPLETED | OUTPATIENT
Start: 2019-06-18 | End: 2019-06-18

## 2019-06-18 RX ORDER — SODIUM CHLORIDE 0.9 % (FLUSH) 0.9 %
10 SYRINGE (ML) INJECTION PRN
Status: DISCONTINUED | OUTPATIENT
Start: 2019-06-18 | End: 2019-06-20 | Stop reason: HOSPADM

## 2019-06-18 RX ORDER — ONDANSETRON 2 MG/ML
4 INJECTION INTRAMUSCULAR; INTRAVENOUS EVERY 6 HOURS PRN
Status: DISCONTINUED | OUTPATIENT
Start: 2019-06-18 | End: 2019-06-20 | Stop reason: HOSPADM

## 2019-06-18 RX ORDER — SODIUM CHLORIDE 0.9 % (FLUSH) 0.9 %
10 SYRINGE (ML) INJECTION EVERY 12 HOURS SCHEDULED
Status: DISCONTINUED | OUTPATIENT
Start: 2019-06-18 | End: 2019-06-20 | Stop reason: HOSPADM

## 2019-06-18 RX ORDER — ACETAMINOPHEN 325 MG/1
650 TABLET ORAL EVERY 6 HOURS PRN
COMMUNITY

## 2019-06-18 RX ORDER — 0.9 % SODIUM CHLORIDE 0.9 %
30 INTRAVENOUS SOLUTION INTRAVENOUS ONCE
Status: COMPLETED | OUTPATIENT
Start: 2019-06-18 | End: 2019-06-18

## 2019-06-18 RX ORDER — ACETAMINOPHEN 325 MG/1
650 TABLET ORAL EVERY 4 HOURS PRN
Status: DISCONTINUED | OUTPATIENT
Start: 2019-06-18 | End: 2019-06-20 | Stop reason: HOSPADM

## 2019-06-18 RX ORDER — ATORVASTATIN CALCIUM 40 MG/1
40 TABLET, FILM COATED ORAL NIGHTLY
Status: DISCONTINUED | OUTPATIENT
Start: 2019-06-18 | End: 2019-06-20 | Stop reason: HOSPADM

## 2019-06-18 RX ADMIN — SODIUM CHLORIDE 2178 ML: 9 INJECTION, SOLUTION INTRAVENOUS at 07:54

## 2019-06-18 RX ADMIN — LEVOTHYROXINE SODIUM 50 MCG: 50 TABLET ORAL at 12:05

## 2019-06-18 RX ADMIN — Medication 10 ML: at 22:03

## 2019-06-18 RX ADMIN — ACETAMINOPHEN 1000 MG: 500 TABLET ORAL at 07:54

## 2019-06-18 RX ADMIN — CEFTRIAXONE SODIUM 2 G: 2 INJECTION, POWDER, FOR SOLUTION INTRAMUSCULAR; INTRAVENOUS at 10:19

## 2019-06-18 RX ADMIN — ASPIRIN 81 MG: 81 TABLET, COATED ORAL at 12:05

## 2019-06-18 RX ADMIN — SODIUM CHLORIDE: 9 INJECTION, SOLUTION INTRAVENOUS at 12:05

## 2019-06-18 RX ADMIN — Medication 10 ML: at 12:05

## 2019-06-18 RX ADMIN — ACETAMINOPHEN 650 MG: 325 TABLET ORAL at 17:00

## 2019-06-18 RX ADMIN — ENOXAPARIN SODIUM 40 MG: 40 INJECTION SUBCUTANEOUS at 12:05

## 2019-06-18 RX ADMIN — ATORVASTATIN CALCIUM 40 MG: 40 TABLET, FILM COATED ORAL at 22:03

## 2019-06-18 RX ADMIN — DOXYCYCLINE 100 MG: 100 INJECTION, POWDER, LYOPHILIZED, FOR SOLUTION INTRAVENOUS at 14:57

## 2019-06-18 ASSESSMENT — ENCOUNTER SYMPTOMS
EYE REDNESS: 0
VOMITING: 0
SINUS PRESSURE: 0
NAUSEA: 0
SORE THROAT: 0
SHORTNESS OF BREATH: 0
DIARRHEA: 0
WHEEZING: 0
BACK PAIN: 0
EYE DISCHARGE: 0
COUGH: 1
EYE PAIN: 0
ABDOMINAL PAIN: 0

## 2019-06-18 ASSESSMENT — PAIN SCALES - GENERAL
PAINLEVEL_OUTOF10: 0
PAINLEVEL_OUTOF10: 8
PAINLEVEL_OUTOF10: 0

## 2019-06-18 NOTE — ED PROVIDER NOTES
No murmur heard. Irregularly irregular heart rate   Pulmonary/Chest: Effort normal and breath sounds normal. No respiratory distress. He has no wheezes. He has no rales. Abdominal: Soft. Bowel sounds are normal. There is no tenderness. There is no rebound and no guarding. Musculoskeletal: He exhibits no edema. Neurological: He is alert. No cranial nerve deficit. Coordination normal.   Patient is alert to self, and birthday but not location or time  No other gross focal neurologic deficits appreciated   Skin: Skin is dry. Skin is hot to the touch   Nursing note and vitals reviewed. Procedures    MDM  Number of Diagnoses or Management Options  Acute kidney injury [facility]): Altered mental status, unspecified altered mental status type:   Chronic kidney disease, unspecified CKD stage:   Septicemia [facility]):   Diagnosis management comments: 81-year-old male sent to the emergency department by EMS for altered mental status. Initial evaluation in the emergency department shows concern for possible sepsis of unknown source. Patient is mildly hypotensive, febrile to the touch confused. Sepsis orders initiated. Differential diagnosis for source includes viral, versus urinary, versus pulmonary. Based on the patient's recent history of cough, as well as fever and altered mental status, suspect septic pathology is likely due to pneumonia, despite fairly benign appearing chest x-ray. The patient was treated empirically with antibiotics. His pressure improved with 30 mL/kg bolus of normal saline. He was admitted to telemetry for further evaluation by hospitalist.      ED Course as of Jun 18 0955   Tue Jun 18, 2019   0826 Patient heart rate has improved. [KS]      ED Course User Index  [KS] Desean Valdivia DO     EKG: This EKG is signed and interpreted by me.     Rate: 81  Rhythm: Sinus  Interpretation: Normal sinus rhythm, no ST HODA, adequate R wave progression, mild left axis deviation  Comparison: No acute changes when compared to previous EKG on 3/5/2019    SEPSIS EVALUATION TOOL Time of presentation: 2019  7:05 AM    SIRS CRITERIA: (2 required)  Temperature <36 or >38  Yes  Heart rate >90    Yes  RR >20 or PCO2 <32   No  WBC >12 or <4 or >10% bands No    SEPSIS CRITERIA: (Both required)  Two or more of the above  Yes  Suspected source(s) of infection Pulmonary    ANTIBIOTIC SELECTION RATIONAL  [id number] Antibiogram    ANTIBIOTIC SELECTION LIMITATIONS  None    LACTIC ACID    Initial     1.1  Follow up - if initial abnormal (>2) N/a    SEVERE SEPSIS CRITERIA: (All 3 of the following criteria needed)  1. SIRS Criteria met   Yes  2. Sepsis Criteria met   Yes  3. Organ dysfunction as evidenced by any one of the following Yes   A. Systolic VW<64 or MAP< 65 or SBP decrease by >40 from baseline   B. Creatinine >2.0, or urine output <0.5 mL/kg/hr for 2 hours   C. Bilirubin > 2 mg/dL   D. Platelet count < 561,393   E. INR > 1.5 or aPTT > 60 sec   F. Lactate > 2 mmol/L    Section requirements: To be done within 3 hours of presentation (05 + 3 hours):  1. Initial lactate measured  2. Broad spectrum antibiotics administered  3. Blood cultures drawn prior to antibiotics  4. Repeat lactate if initial level >2 (6 hour requirement)    SEPTIC SHOCK CRITERIA: (Both of the following must be met)  1. Severe sepsis criteria met   No   AND either of the followin. Lactate > 4 mmol/ L on any reading No    OR      Tissue hypoperfusion after crystalloids as evidenced by either: No   A. SBP <90 or MAP <65                        Or   B. Decrease in SBP by > 40 points from baseline         Section requirements: To be done within 3 hours of presentation (registration(05 + 3 hours):  1. Requirements as in Severe Sepsis  2. Resuscitation with 30 ml/kg crystalloid fluid. Maintain MAP >65  3. Vasopressors if hypotension persists (6 hour requirement)  4.  Repeat volume status and tissue perfusion assessment (ErRiskSepsisReEvaluation)     FLUIDS  Saline 30 ml/kg given (over 30 min) Yes    FLUID ADMINISTRATION BARRIERS  None     OTHER TREATMENT BARRIERS  None    CENTRAL LINE INSERTED? No      --------------------------------------------- PAST HISTORY ---------------------------------------------  Past Medical History:  has a past medical history of Cancer ([facility] [address] Hypertension, Inguinal hernia, Mediastinal adenopathy, Peripheral vascular disease ([facility] [address] and Thyroid disease. Past Surgical History:  has a past surgical history that includes Abdomen surgery; Balloon angioplasty, artery (8/6/2014); hernia repair (Right); vascular surgery; Appendectomy; Tonsillectomy; Colonoscopy (20106); and bronchoscopy (10/27/2017). Social History:  reports that he has been smoking cigarettes. He started smoking about 63 years ago. He has a 31.50 pack-year smoking history. His smokeless tobacco use includes snuff. He reports that he drinks about 2.4 oz of alcohol per week. He reports that he does not use drugs. Family History: family history includes Cancer in his brother; Heart Failure in his mother; Stroke in his father. The patients home medications have been reviewed. Allergies: Patient has no known allergies.     -------------------------------------------------- RESULTS -------------------------------------------------    LABS:  Results for orders placed or performed during the hospital encounter of 06/18/19   CBC Auto Differential   Result Value Ref Range    WBC 8.5 4.5 - 11.5 E9/L    RBC 5.11 3.80 - 5.80 E12/L    Hemoglobin 15.2 12.5 - 16.5 g/dL    Hematocrit 46.0 37.0 - 54.0 %    MCV 90.0 80.0 - 99.9 fL    MCH 29.7 26.0 - 35.0 pg    MCHC 33.0 32.0 - 34.5 %    RDW 13.2 11.5 - 15.0 fL    Platelets 686 386 - 908 E9/L    MPV 9.0 7.0 - 12.0 fL    Neutrophils % 83.7 (H) 43.0 - 80.0 %    Immature Granulocytes % 0.5 0.0 - 5.0 %    Lymphocytes % 7.3 (L) 20.0 - 42.0 %    Monocytes % 7.3 2.0 - 12.0 %    Eosinophils % 0.7 0.0 - 6.0 %    Basophils % 0.5 0.0 - 2.0 %    Neutrophils # 7.07 1.80 - 7.30 E9/L    Immature Granulocytes # 0.04 E9/L    Lymphocytes # 0.62 (L) 1.50 - 4.00 E9/L    Monocytes # 0.62 0.10 - 0.95 E9/L    Eosinophils # 0.06 0.05 - 0.50 E9/L    Basophils # 0.04 0.00 - 0.20 E9/L   Comprehensive Metabolic Panel w/ Reflex to MG   Result Value Ref Range    Sodium 137 132 - 146 mmol/L    Potassium reflex Magnesium 4.4 3.5 - 5.0 mmol/L    Chloride 102 98 - 107 mmol/L    CO2 25 22 - 29 mmol/L    Anion Gap 10 7 - 16 mmol/L    Glucose 110 (H) 74 - 99 mg/dL    BUN 14 8 - 23 mg/dL    CREATININE 1.5 (H) 0.7 - 1.2 mg/dL    GFR Non-African American 45 >=60 mL/min/1.73    GFR African American 55     Calcium 8.7 8.6 - 10.2 mg/dL    Total Protein 6.1 (L) 6.4 - 8.3 g/dL    Alb 4.1 3.5 - 5.2 g/dL    Total Bilirubin 0.5 0.0 - 1.2 mg/dL    Alkaline Phosphatase 72 40 - 129 U/L    ALT 8 0 - 40 U/L    AST 12 0 - 39 U/L   Troponin   Result Value Ref Range    Troponin <0.01 0.00 - 0.03 ng/mL   Lipase   Result Value Ref Range    Lipase 19 13 - 60 U/L   Lactic Acid, Plasma   Result Value Ref Range    Lactic Acid 1.1 0.5 - 2.2 mmol/L   Protime-INR   Result Value Ref Range    Protime 10.8 9.3 - 12.4 sec    INR 1.0    Urinalysis, reflex to microscopic   Result Value Ref Range    Color, UA Yellow Straw/Yellow    Clarity, UA Clear Clear    Glucose, Ur Negative Negative mg/dL    Bilirubin Urine Negative Negative    Ketones, Urine Negative Negative mg/dL    Specific Gravity, UA 1.025 1.005 - 1.030    Blood, Urine TRACE-LYSED Negative    pH, UA 5.5 5.0 - 9.0    Protein, UA Negative Negative mg/dL    Urobilinogen, Urine 0.2 <2.0 E.U./dL    Nitrite, Urine Negative Negative    Leukocyte Esterase, Urine Negative Negative   Microscopic Urinalysis   Result Value Ref Range    Mucus, UA Present     WBC, UA 0-1 0 - 5 /HPF    RBC, UA 1-3 0 - 2 /HPF    Bacteria, UA NONE /HPF   EKG 12 Lead   Result Value Ref Range    Ventricular Rate 81 BPM    Atrial Rate 81 BPM    P-R notes within the ED encounter and vital signs as below have been reviewed. Patient Vitals for the past 24 hrs:   BP Temp Temp src Pulse Resp SpO2 Height Weight   06/18/19 0849 (!) 150/64 100.3 °F (37.9 °C) Oral 76 16 96 % -- --   06/18/19 0711 91/69 100.9 °F (38.3 °C) Oral 109 16 93 % 5' 7\" (1.702 m) 160 lb (72.6 kg)       Oxygen Saturation Interpretation: Normal    ------------------------------------------ PROGRESS NOTES ------------------------------------------  ED Course as of Jun 18 0955 Tue Jun 18, 2019   0826 Patient heart rate has improved. [KS]      ED Course User Index  [KS] Lucila Rg DO         Counseling:  I have spoken with the patient and daughter in law and discussed todays results, in addition to providing specific details for the plan of care and counseling regarding the diagnosis and prognosis. Their questions are answered at this time and they are agreeable with the plan of admission.    --------------------------------- ADDITIONAL PROVIDER NOTES ---------------------------------  Consultations:  Time: 0950. Spoke with Dr. Cody Anguiano. Discussed case. They will admit the patient. This patient's ED course included: a personal history and physicial examination      Diagnosis:  1. Altered mental status, unspecified altered mental status type    2. Septicemia ([facility] [address]    3. Chronic kidney disease, unspecified CKD stage    4. [facility] ([facility]        Disposition:  Patient's disposition: Admit to telemetry  Patient's condition is stable.              Lucila Rg DO  Resident  06/18/19 4439

## 2019-06-18 NOTE — PROGRESS NOTES
Occupational Therapy  Date:6/18/2019  Patient Name: Harriett Bonilla  Room: 4869/6653-S     Occupational Therapy (OT) evaluation attempted this afternoon; OT evaluation held, per nursing. OT evaluation to be re-attempted at later date, as able/appropriate. Kasey Alvarado, OTR/L  License Number: [id number]

## 2019-06-18 NOTE — ED NOTES
Patient straight cath without difficulty for urine. 300 cc of clear yellow urine.       Harpal Garner RN  06/18/19 1991

## 2019-06-18 NOTE — PROGRESS NOTES
Database complete. Medications reconciled. Care plans and education initiated. Pulmonologist is Dr. Forest Marquez. Vascular surgeon is Dr. Ambar Goyal.

## 2019-06-19 LAB
ALBUMIN SERPL-MCNC: 3.2 G/DL (ref 3.5–5.2)
ALP BLD-CCNC: 52 U/L (ref 40–129)
ALT SERPL-CCNC: 9 U/L (ref 0–40)
ANION GAP SERPL CALCULATED.3IONS-SCNC: 9 MMOL/L (ref 7–16)
AST SERPL-CCNC: 12 U/L (ref 0–39)
BASOPHILS ABSOLUTE: 0.03 E9/L (ref 0–0.2)
BASOPHILS RELATIVE PERCENT: 0.7 % (ref 0–2)
BILIRUB SERPL-MCNC: 0.4 MG/DL (ref 0–1.2)
BUN BLDV-MCNC: 12 MG/DL (ref 8–23)
CALCIUM SERPL-MCNC: 8.1 MG/DL (ref 8.6–10.2)
CHLORIDE BLD-SCNC: 104 MMOL/L (ref 98–107)
CHOLESTEROL, TOTAL: 121 MG/DL (ref 0–199)
CO2: 22 MMOL/L (ref 22–29)
CREAT SERPL-MCNC: 1.4 MG/DL (ref 0.7–1.2)
EOSINOPHILS ABSOLUTE: 0.03 E9/L (ref 0.05–0.5)
EOSINOPHILS RELATIVE PERCENT: 0.7 % (ref 0–6)
GFR AFRICAN AMERICAN: 59
GFR NON-AFRICAN AMERICAN: 49 ML/MIN/1.73
GLUCOSE BLD-MCNC: 103 MG/DL (ref 74–99)
HCT VFR BLD CALC: 38.6 % (ref 37–54)
HDLC SERPL-MCNC: 40 MG/DL
HEMOGLOBIN: 12.8 G/DL (ref 12.5–16.5)
IMMATURE GRANULOCYTES #: 0.02 E9/L
IMMATURE GRANULOCYTES %: 0.4 % (ref 0–5)
LDL CHOLESTEROL CALCULATED: 52 MG/DL (ref 0–99)
LYMPHOCYTES ABSOLUTE: 0.92 E9/L (ref 1.5–4)
LYMPHOCYTES RELATIVE PERCENT: 20.1 % (ref 20–42)
MAGNESIUM: 1.8 MG/DL (ref 1.6–2.6)
MCH RBC QN AUTO: 29.6 PG (ref 26–35)
MCHC RBC AUTO-ENTMCNC: 33.2 % (ref 32–34.5)
MCV RBC AUTO: 89.1 FL (ref 80–99.9)
MONOCYTES ABSOLUTE: 0.64 E9/L (ref 0.1–0.95)
MONOCYTES RELATIVE PERCENT: 14 % (ref 2–12)
NEUTROPHILS ABSOLUTE: 2.94 E9/L (ref 1.8–7.3)
NEUTROPHILS RELATIVE PERCENT: 64.1 % (ref 43–80)
PDW BLD-RTO: 12.9 FL (ref 11.5–15)
PLATELET # BLD: 157 E9/L (ref 130–450)
PMV BLD AUTO: 9 FL (ref 7–12)
POTASSIUM SERPL-SCNC: 3.8 MMOL/L (ref 3.5–5)
RBC # BLD: 4.33 E12/L (ref 3.8–5.8)
SODIUM BLD-SCNC: 135 MMOL/L (ref 132–146)
TOTAL PROTEIN: 5 G/DL (ref 6.4–8.3)
TRIGL SERPL-MCNC: 143 MG/DL (ref 0–149)
VLDLC SERPL CALC-MCNC: 29 MG/DL
WBC # BLD: 4.6 E9/L (ref 4.5–11.5)

## 2019-06-19 PROCEDURE — 36415 COLL VENOUS BLD VENIPUNCTURE: CPT

## 2019-06-19 PROCEDURE — 6370000000 HC RX 637 (ALT 250 FOR IP): Performed by: INTERNAL MEDICINE

## 2019-06-19 PROCEDURE — 83735 ASSAY OF MAGNESIUM: CPT

## 2019-06-19 PROCEDURE — 2060000000 HC ICU INTERMEDIATE R&B

## 2019-06-19 PROCEDURE — 2580000003 HC RX 258: Performed by: INTERNAL MEDICINE

## 2019-06-19 PROCEDURE — 80061 LIPID PANEL: CPT

## 2019-06-19 PROCEDURE — 80053 COMPREHEN METABOLIC PANEL: CPT

## 2019-06-19 PROCEDURE — 85025 COMPLETE CBC W/AUTO DIFF WBC: CPT

## 2019-06-19 PROCEDURE — 97161 PT EVAL LOW COMPLEX 20 MIN: CPT

## 2019-06-19 PROCEDURE — 6360000002 HC RX W HCPCS: Performed by: INTERNAL MEDICINE

## 2019-06-19 PROCEDURE — 97165 OT EVAL LOW COMPLEX 30 MIN: CPT

## 2019-06-19 RX ADMIN — Medication 10 ML: at 11:04

## 2019-06-19 RX ADMIN — ACETAMINOPHEN 650 MG: 325 TABLET ORAL at 00:52

## 2019-06-19 RX ADMIN — DOXAZOSIN MESYLATE 4 MG: 4 TABLET ORAL at 11:00

## 2019-06-19 RX ADMIN — ENOXAPARIN SODIUM 40 MG: 40 INJECTION SUBCUTANEOUS at 11:00

## 2019-06-19 RX ADMIN — Medication 10 ML: at 20:40

## 2019-06-19 RX ADMIN — LEVOTHYROXINE SODIUM 50 MCG: 50 TABLET ORAL at 06:23

## 2019-06-19 RX ADMIN — CEFTRIAXONE 1 G: 1 INJECTION, POWDER, FOR SOLUTION INTRAMUSCULAR; INTRAVENOUS at 11:01

## 2019-06-19 RX ADMIN — ASPIRIN 81 MG: 81 TABLET, COATED ORAL at 11:00

## 2019-06-19 RX ADMIN — ATORVASTATIN CALCIUM 40 MG: 40 TABLET, FILM COATED ORAL at 20:39

## 2019-06-19 ASSESSMENT — PAIN SCALES - GENERAL
PAINLEVEL_OUTOF10: 0

## 2019-06-19 NOTE — PROGRESS NOTES
Physical Therapy    Facility/Department: [facility] MED SURG  Initial Assessment    NAME: Yaniv Enamorado  : 1940  MRN: [id number]    Date of Service: 2019       REQUIRES PT FOLLOW UP: Yes       Patient Diagnosis(es): The primary encounter diagnosis was Altered mental status, unspecified altered mental status type. Diagnoses of Septicemia ([facility] [address] Chronic kidney disease, unspecified CKD stage, and Acute kidney injury ([facility] [address] were also pertinent to this visit. has a past medical history of Cancer ([facility] [address] Hypertension, Inguinal hernia, Mediastinal adenopathy, Peripheral vascular disease ([facility] [address] and Thyroid disease. has a past surgical history that includes Abdomen surgery; Balloon angioplasty, artery (2014); hernia repair (Right); vascular surgery; Appendectomy; Tonsillectomy; Colonoscopy (); and bronchoscopy (10/27/2017). Evaluating Therapist: Noe Austin PT        Room #: 688   DIAGNOSIS:  Metabolic encephalopathy   PRECAUTIONS: falls, contact and droplet isolation    Social:  Pt lives with  Spouse  in a  1  floor plan  steps and 1 rails to enter. Prior to admission pt walked with no AD, independent      Initial Evaluation  Date:  19 Treatment      Short Term/ Long Term   Goals   Was pt agreeable to Eval/treatment?   yes      Does pt have pain?  none reported      Bed Mobility  Rolling:  Independent   Supine to sit: independent   Sit to supine:  NT   Scooting: independent    independent   Transfers Sit to stand: supervision  Stand to sit:  supervision  Stand pivot:  Supervision    independent    Ambulation     15 feet x 1 and 50 feet x 1 with  No AD  with Supervision    200 feet with  No AD  with independent        Stair negotiation: ascended and descended NT, in isolation   4-12  steps with  1  rail with independent    LE ROM  WFL     LE strength  4/5      AM- PAC RAW score  20/ 24            Pt is alert and Oriented x  3, but some confusion noted      Balance: supervision       Chair alarm: yes      ASSESSMENT  Pt displays functional ability as noted in the objective portion of this evaluation. Comments/Treatment:   Pt left up in chair with call light in reach        Examination of body systems Decreased   Functional mobility x   ROM    Strength    Safety Awareness x   Cognition    Endurance x   Sensation    Balance x   Vision/Visual Deficits    Coordination        Patient education  Pt educated on  Fall risk     Patient response to education:   Pt verbalized understanding Pt demonstrated skill Pt requires further education in this area   x  x     Rehab potential is Good for reaching above PT goals. Pts/ family goals   1. None stated     Patient and or family understand(s) diagnosis, prognosis, and plan of care. -  Yes ? PLAN  PT care will be provided in accordance with the objectives noted above. Whenever appropriate, clear delegation orders will be provided for nursing staff. Exercises and functional mobility practice will be used as well as appropriate assistive devices or modalities to obtain goals. Patient and family education will also be administered as needed. Frequency of treatments will be 2-3 x/week x 5 days.     Time in:  0953  Time out: [address] number:  PT 1580

## 2019-06-19 NOTE — CARE COORDINATION
Social work / Discharge Planning:         Social work attempted to see patient for initial assessment but he was unavailable at the time. Chart reviewed. Per therapy note, the patient lives with his wife and uses no DME. Current AM PAC 20/24. No discharge needs identified at this time. Social work will follow with CM.   Electronically signed by YARIEL Greene on 6/19/2019 at 3:16 PM

## 2019-06-19 NOTE — H&P
[address] [facility]  History and Physical      CHIEF COMPLAINT: AMS      HISTORY OF PRESENT ILLNESS:      The patient is a 69 y.o. male patient of Dr. Jennifer Moreira who presents with AMS patient presents emergency department from [facility] for evaluation of altered mental status. The patient's family was requesting transfer. Per family patient's been coughing. Pt acknowleges runny nose and cough denies fever, sputum or SOB. Admits to being confused, states better know. Past Medical History:    Past Medical History:   Diagnosis Date    Cancer [facility]) 2014    prostate    Hypertension     Inguinal hernia     Mediastinal adenopathy 10/2017    [facility] ([facility] [address]     Thyroid disease        Past Surgical History:    Past Surgical History:   Procedure Laterality Date    ABDOMEN SURGERY      APPENDECTOMY      BALLOON ANGIOPLASTY, ARTERY  8/6/2014    [employer] 8x10 left common iliac Dr York Miu  10/27/2017    COLONOSCOPY  20106    HERNIA REPAIR Right     R inguinal    TONSILLECTOMY      VASCULAR SURGERY      stent left leg       Medications Prior to Admission:    Medications Prior to Admission: acetaminophen (TYLENOL) 325 MG tablet, Take 650 mg by mouth every 6 hours as needed for Pain  levothyroxine (SYNTHROID) 50 MCG tablet, Take 50 mcg by mouth every morning   terazosin (HYTRIN) 5 MG capsule, Take 5 mg by mouth nightly     Allergies:    Patient has no known allergies. Social History:    reports that he has been smoking cigarettes. He started smoking about 63 years ago. He has a 31.50 pack-year smoking history. His smokeless tobacco use includes snuff. He reports that he drinks about 8.4 oz of alcohol per week. He reports that he does not use drugs. Family History:   family history includes Cancer in his brother; Heart Failure in his mother; Pacemaker in his mother; Stroke in his father.     REVIEW OF SYSTEMS:  As above in the HPI, otherwise negative    PHYSICAL (peripheral vascular disease) (HCC)    CKD (chronic kidney disease) stage 3, GFR 30-59 ml/min (HCC)    Rhinovirus infection  Resolved Problems:    * No resolved hospital problems.  *      PLAN:    Admit  Monitor neurological status closely  Seroquel PRN for agitation/sleep  MRI negative  Adjust/decrease/D/C any sedating or anticholinergic medications where possible  Evaluate for evidence of infection--+ rhinovirus  - D/C Abx  Evaluate for fecal or urinary retention- [t denies  Attempt to re-orient to normal sleep-wake cycle  Medications for other co morbidities cont as appropriate w dosage adjustments as necessary   PT/OT  DVT PPx  DC planning  24 -48 hrs      Electronically signed by Gita Crowder MD on 6/19/2019 at 10:31 AM

## 2019-06-19 NOTE — PROGRESS NOTES
techniques for completion of ADL, safe functional transfers and mobility. Patient required cues for follow through with proper hand/foot placement, pacing, safety, and technique in bed mobility, functional transfers, functional mobility, LB dressing, toileting and grooming in preparation for maximum independence in all self care tasks. Eval Complexity: Low    Assessment of current deficits   Functional mobility [x]  ADLs [x] Strength [x]  Cognition []  Functional transfers  [x] IADLs [x] Safety Awareness [x]  Endurance [x]  Fine Motor Coordination [] Balance [x] Vision/perception [] Sensation []   Gross Motor Coordination [] ROM [] Delirium []                  Motor Control []    Plan of Care:   ADL retraining [x]   Equipment needs [x]   Neuromuscular re-education [x] Energy Conservation Techniques [x]  Functional Transfer training [x] Patient and/or Family Education [x]  Functional Mobility training [x]  Environmental Modifications [x]  Cognitive re-training []   Compensatory techniques for ADLs [x]  Splinting Needs []   Positioning to improve overall function [x]   Therapeutic Activity [x]  Therapeutic Exercise  [x]  Visual/Perceptual: []    Delirium prevention/treatment  []   Other:  []    Rehab Potential: Good for established goals     Patient / Family Goal: To get home. Patient and/or family were instructed on functional diagnosis, prognosis/goals and OT plan of care. Pt verbalized good understanding. Upon arrival, patient supine in bed. At end of session, patient seated in chair with call light and phone within reach, all lines and tubes intact. Pt would benefit from continued skilled OT to increase safety and independence with completion of ADL/IADL tasks for functional independence and quality of life. Bed/chair alarm: ON.     Low Evaluation + 0 timed treatment minutes    Evaluation time includes thorough review of current medical information, gathering information on past medical history/social history and prior level of function, completion of standardized testing/informal observation of tasks, assessment of data, and development of POC/Goals    Arther Gear OTR/L  [id number]

## 2019-06-19 NOTE — PLAN OF CARE
Problem: Falls - Risk of:  Goal: Will remain free from falls  Description  Will remain free from falls  Outcome: Ongoing  Goal: Absence of physical injury  Description  Absence of physical injury  Outcome: Ongoing     Problem: Pain:  Goal: Pain level will decrease  Description  Pain level will decrease     Outcome: Ongoing     Problem: Fluid and Electrolyte Imbalance  Goal: Fluid and electrolyte balance are achieved/maintained  Outcome: Ongoing

## 2019-06-20 VITALS
WEIGHT: 154.7 LBS | HEART RATE: 52 BPM | SYSTOLIC BLOOD PRESSURE: 111 MMHG | TEMPERATURE: 98.2 F | DIASTOLIC BLOOD PRESSURE: 57 MMHG | HEIGHT: 67 IN | OXYGEN SATURATION: 97 % | BODY MASS INDEX: 24.28 KG/M2 | RESPIRATION RATE: 16 BRPM

## 2019-06-20 LAB — URINE CULTURE, ROUTINE: NORMAL

## 2019-06-20 PROCEDURE — 6370000000 HC RX 637 (ALT 250 FOR IP): Performed by: INTERNAL MEDICINE

## 2019-06-20 PROCEDURE — 2580000003 HC RX 258: Performed by: INTERNAL MEDICINE

## 2019-06-20 PROCEDURE — 6360000002 HC RX W HCPCS: Performed by: INTERNAL MEDICINE

## 2019-06-20 RX ADMIN — LEVOTHYROXINE SODIUM 50 MCG: 50 TABLET ORAL at 06:44

## 2019-06-20 RX ADMIN — ENOXAPARIN SODIUM 40 MG: 40 INJECTION SUBCUTANEOUS at 10:51

## 2019-06-20 RX ADMIN — ASPIRIN 81 MG: 81 TABLET, COATED ORAL at 10:51

## 2019-06-20 RX ADMIN — DOXAZOSIN MESYLATE 4 MG: 4 TABLET ORAL at 10:51

## 2019-06-20 RX ADMIN — Medication 10 ML: at 10:52

## 2019-06-20 ASSESSMENT — PAIN SCALES - GENERAL
PAINLEVEL_OUTOF10: 0

## 2019-06-20 NOTE — PROGRESS NOTES
[facility] Quality Flow/Interdisciplinary Rounds Progress Note        Quality Flow Rounds held on June 20, 2019    Disciplines Attending:  Bedside Nurse, ,  and Nursing Unit Leadership    Brianne Caraballo was admitted on 6/18/2019  7:05 AM    Anticipated Discharge Date:  Expected Discharge Date: 06/21/19    Disposition:    Brian Score:  Brian Scale Score: 20    Readmission Risk              Risk of Unplanned Readmission:        13           Discussed patient goal for the day, patient clinical progression, and barriers to discharge.   The following Goal(s) of the Day/Commitment(s) have been identified:  Check discharge      Silvio Marie  June 20, 2019

## 2019-06-20 NOTE — CARE COORDINATION
Met w patient - introduced self and case mgnt role- discussed treatment and dc plan -- states he lives w wife -- has been independent- states he feels much better - coughing has decreased.   No dc needs identified but will cont to follow

## 2019-06-20 NOTE — PLAN OF CARE
Problem: Falls - Risk of:  Goal: Will remain free from falls  Description  Will remain free from falls  Outcome: Ongoing  Goal: Absence of physical injury  Description  Absence of physical injury  Outcome: Ongoing     Problem: Pain:  Goal: Pain level will decrease  Description  Pain level will decrease     Outcome: Ongoing     Problem: Fluid and Electrolyte Imbalance  Goal: Fluid and electrolyte balance are achieved/maintained  Outcome: Ongoing     Problem: Fluid Volume:  Goal: Maintenance of adequate hydration will improve  Description  Maintenance of adequate hydration will improve  Outcome: Ongoing     Problem: Physical Regulation:  Goal: Ability to maintain a body temperature in the normal range will improve  Description  Ability to maintain a body temperature in the normal range will improve  Outcome: Ongoing  Goal: Will regain or maintain usual level of consciousness  Description  Will regain or maintain usual level of consciousness  Outcome: Ongoing  Goal: Complications related to the disease process, condition or treatment will be avoided or minimized  Description  Complications related to the disease process, condition or treatment will be avoided or minimized  Outcome: Ongoing     Problem: PROTECTIVE PRECAUTIONS  Goal: Isolation precautions  Description  Isolation precautions     Outcome: Ongoing     Problem:   Goal: Adequate urinary output  Outcome: Ongoing  Goal: No urinary complication  Outcome: Ongoing     Problem: Musculor/Skeletal Functional Status  Goal: Highest potential functional level  Outcome: Ongoing  Goal: Absence of falls  Outcome: Ongoing

## 2019-06-20 NOTE — PROGRESS NOTES
Subjective:  Feeling better No CP, SOB, F, V, D, P    Objective:    BP (!) 111/57   Pulse 52   Temp 98.2 °F (36.8 °C)   Resp 16   Ht 5' 7\" (1.702 m)   Wt 154 lb 11.2 oz (70.2 kg)   SpO2 97%   BMI 24.23 kg/m²     24HR INTAKE/OUTPUT:      Intake/Output Summary (Last 24 hours) at 6/20/2019 1247  Last data filed at 6/19/2019 1943  Gross per 24 hour   Intake 360 ml   Output 650 ml   Net -290 ml     nad  Heart:  RRR, no murmurs, gallops, or rubs. Lungs:  CTA bilaterally, no wheeze, rales or rhonchi  Abd: bowel sounds present, nontender, nondistended, no masses  Extrem:  No clubbing, cyanosis, or edema    Most Recent Labs  Lab Results   Component Value Date    WBC 4.6 06/19/2019    HGB 12.8 06/19/2019    HCT 38.6 06/19/2019     06/19/2019     06/19/2019    K 3.8 06/19/2019     06/19/2019    CREATININE 1.4 (H) 06/19/2019    BUN 12 06/19/2019    CO2 22 06/19/2019    GLUCOSE 103 (H) 06/19/2019    ALT 9 06/19/2019    AST 12 06/19/2019    INR 1.0 06/18/2019     Recent Labs     06/19/19  0353   MG 1.8     Lab Results   Component Value Date    CALCIUM 8.1 (L) 06/19/2019        MRI BRAIN WO CONTRAST   Final Result   1. No evidence of restricted diffusion or acute cerebrovascular   infarction/accident. 2. Moderate cerebral volume loss/atrophy with extensive white matter   hyperintensities suggestive of sequelae small vessel ischemic disease. 3. Remote lacunar infarction left basal ganglia and bilateral   external/extreme capsular regions. .      CT Head WO Contrast   Final Result   1. No acute intracranial hemorrhage or edema. 2. Atherosclerotic disease involving intracranial internal carotid   arteries. 3. Chronic small vessel ischemic changes. 4. Age-appropriate atrophy. Old lacunar CVAs are identified in the   external capsule bilaterally. XR CHEST PORTABLE   Final Result   No radiographic evidence of acute cardiopulmonary disease.    Vascular calcifications thoracic aorta.          Assessment    Principal Problem:    Metabolic encephalopathy  Active Problems:    Acquired hypothyroidism    PVD (peripheral vascular disease) ([facility])    CKD (chronic kidney disease) stage 3, GFR 30-59 ml/min ([facility])    Rhinovirus infection  Resolved Problems:    * No resolved hospital problems.  *      Plan:    Admit  Monitor neurological status closely-- improved  Seroquel PRN for agitation/sleep  MRI negative  CXR negative for pneumonia  UA - negative  Adjust/decrease/D/C any sedating or anticholinergic medications where possible  Evaluate for evidence of infection--+ rhinovirus  - D/C Abx  Evaluate for fecal or urinary retention- [t denies  Attempt to re-orient to normal sleep-wake cycle  Medications for other co morbidities cont as appropriate w dosage adjustments as necessary   PT/OT  DVT PPx  DC planning today      Electronically signed by Perez Martinez MD on 6/20/2019 at 12:47 PM

## 2019-06-23 LAB
BLOOD CULTURE, ROUTINE: NORMAL
CULTURE, BLOOD 2: NORMAL

## 2019-11-08 ENCOUNTER — APPOINTMENT (OUTPATIENT)
Dept: CT IMAGING | Age: 79
DRG: 202 | End: 2019-11-08
Payer: MEDICARE

## 2019-11-08 ENCOUNTER — HOSPITAL ENCOUNTER (INPATIENT)
Age: 79
LOS: 2 days | Discharge: HOME | DRG: 202 | End: 2019-11-10
Attending: EMERGENCY MEDICINE | Admitting: INTERNAL MEDICINE
Payer: MEDICARE

## 2019-11-08 ENCOUNTER — APPOINTMENT (OUTPATIENT)
Dept: GENERAL RADIOLOGY | Age: 79
DRG: 202 | End: 2019-11-08
Payer: MEDICARE

## 2019-11-08 DIAGNOSIS — J18.9 PNEUMONIA DUE TO ORGANISM: ICD-10-CM

## 2019-11-08 DIAGNOSIS — G93.41 ACUTE METABOLIC ENCEPHALOPATHY: Primary | ICD-10-CM

## 2019-11-08 PROBLEM — J16.0 CAP (COMMUNITY ACQUIRED PNEUMONIA) DUE TO CHLAMYDIA SPECIES: Status: ACTIVE | Noted: 2019-11-08

## 2019-11-08 LAB
ALBUMIN SERPL-MCNC: 3.8 G/DL (ref 3.5–5.2)
ALP BLD-CCNC: 68 U/L (ref 40–129)
ALT SERPL-CCNC: 6 U/L (ref 0–40)
ANION GAP SERPL CALCULATED.3IONS-SCNC: 7 MMOL/L (ref 7–16)
APTT: 27.2 SEC (ref 24.5–35.1)
AST SERPL-CCNC: 15 U/L (ref 0–39)
BACTERIA: NORMAL /HPF
BASOPHILS ABSOLUTE: 0.03 E9/L (ref 0–0.2)
BASOPHILS RELATIVE PERCENT: 0.4 % (ref 0–2)
BILIRUB SERPL-MCNC: 0.4 MG/DL (ref 0–1.2)
BILIRUBIN URINE: NEGATIVE
BLOOD, URINE: NORMAL
BUN BLDV-MCNC: 11 MG/DL (ref 8–23)
CALCIUM SERPL-MCNC: 9.1 MG/DL (ref 8.6–10.2)
CHLORIDE BLD-SCNC: 97 MMOL/L (ref 98–107)
CLARITY: CLEAR
CO2: 28 MMOL/L (ref 22–29)
COLOR: YELLOW
CREAT SERPL-MCNC: 1.6 MG/DL (ref 0.7–1.2)
EKG ATRIAL RATE: 62 BPM
EKG P AXIS: 74 DEGREES
EKG P-R INTERVAL: 162 MS
EKG Q-T INTERVAL: 372 MS
EKG QRS DURATION: 84 MS
EKG QTC CALCULATION (BAZETT): 377 MS
EKG R AXIS: 25 DEGREES
EKG T AXIS: 51 DEGREES
EKG VENTRICULAR RATE: 62 BPM
EOSINOPHILS ABSOLUTE: 0 E9/L (ref 0.05–0.5)
EOSINOPHILS RELATIVE PERCENT: 0 % (ref 0–6)
GFR AFRICAN AMERICAN: 51
GFR NON-AFRICAN AMERICAN: 42 ML/MIN/1.73
GLUCOSE BLD-MCNC: 116 MG/DL (ref 74–99)
GLUCOSE URINE: NEGATIVE MG/DL
HCT VFR BLD CALC: 43.5 % (ref 37–54)
HEMOGLOBIN: 14.4 G/DL (ref 12.5–16.5)
IMMATURE GRANULOCYTES #: 0.03 E9/L
IMMATURE GRANULOCYTES %: 0.4 % (ref 0–5)
INR BLD: 1
KETONES, URINE: NEGATIVE MG/DL
LACTIC ACID, SEPSIS: 1.2 MMOL/L (ref 0.5–1.9)
LEUKOCYTE ESTERASE, URINE: NEGATIVE
LIPASE: 19 U/L (ref 13–60)
LYMPHOCYTES ABSOLUTE: 0.78 E9/L (ref 1.5–4)
LYMPHOCYTES RELATIVE PERCENT: 11.5 % (ref 20–42)
MCH RBC QN AUTO: 29.6 PG (ref 26–35)
MCHC RBC AUTO-ENTMCNC: 33.1 % (ref 32–34.5)
MCV RBC AUTO: 89.3 FL (ref 80–99.9)
MONOCYTES ABSOLUTE: 0.66 E9/L (ref 0.1–0.95)
MONOCYTES RELATIVE PERCENT: 9.7 % (ref 2–12)
NEUTROPHILS ABSOLUTE: 5.3 E9/L (ref 1.8–7.3)
NEUTROPHILS RELATIVE PERCENT: 78 % (ref 43–80)
NITRITE, URINE: NEGATIVE
PDW BLD-RTO: 12.6 FL (ref 11.5–15)
PH UA: 6 (ref 5–9)
PLATELET # BLD: 213 E9/L (ref 130–450)
PMV BLD AUTO: 8.8 FL (ref 7–12)
POTASSIUM REFLEX MAGNESIUM: 3.9 MMOL/L (ref 3.5–5)
PROCALCITONIN: 0.09 NG/ML (ref 0–0.08)
PROTEIN UA: NEGATIVE MG/DL
PROTHROMBIN TIME: 10.8 SEC (ref 9.3–12.4)
RBC # BLD: 4.87 E12/L (ref 3.8–5.8)
RBC UA: NORMAL /HPF (ref 0–2)
SODIUM BLD-SCNC: 132 MMOL/L (ref 132–146)
SPECIFIC GRAVITY UA: 1.01 (ref 1–1.03)
T4 TOTAL: 6.4 MCG/DL (ref 4.5–11.7)
TOTAL PROTEIN: 6.4 G/DL (ref 6.4–8.3)
TSH SERPL DL<=0.05 MIU/L-ACNC: 4.3 UIU/ML (ref 0.27–4.2)
UROBILINOGEN, URINE: 0.2 E.U./DL
WBC # BLD: 6.8 E9/L (ref 4.5–11.5)
WBC UA: NORMAL /HPF (ref 0–5)

## 2019-11-08 PROCEDURE — 6370000000 HC RX 637 (ALT 250 FOR IP): Performed by: INTERNAL MEDICINE

## 2019-11-08 PROCEDURE — 2580000003 HC RX 258: Performed by: INTERNAL MEDICINE

## 2019-11-08 PROCEDURE — 80053 COMPREHEN METABOLIC PANEL: CPT

## 2019-11-08 PROCEDURE — 83690 ASSAY OF LIPASE: CPT

## 2019-11-08 PROCEDURE — 85025 COMPLETE CBC W/AUTO DIFF WBC: CPT

## 2019-11-08 PROCEDURE — 2060000000 HC ICU INTERMEDIATE R&B

## 2019-11-08 PROCEDURE — 99285 EMERGENCY DEPT VISIT HI MDM: CPT

## 2019-11-08 PROCEDURE — 84145 PROCALCITONIN (PCT): CPT

## 2019-11-08 PROCEDURE — 93005 ELECTROCARDIOGRAM TRACING: CPT | Performed by: EMERGENCY MEDICINE

## 2019-11-08 PROCEDURE — 84443 ASSAY THYROID STIM HORMONE: CPT

## 2019-11-08 PROCEDURE — 81001 URINALYSIS AUTO W/SCOPE: CPT

## 2019-11-08 PROCEDURE — 2580000003 HC RX 258: Performed by: EMERGENCY MEDICINE

## 2019-11-08 PROCEDURE — 83605 ASSAY OF LACTIC ACID: CPT

## 2019-11-08 PROCEDURE — 0100U HC RESPIRPTHGN MULT REV TRANS & AMP PRB TECH 21 TRGT: CPT

## 2019-11-08 PROCEDURE — 85610 PROTHROMBIN TIME: CPT

## 2019-11-08 PROCEDURE — 70450 CT HEAD/BRAIN W/O DYE: CPT

## 2019-11-08 PROCEDURE — 36415 COLL VENOUS BLD VENIPUNCTURE: CPT

## 2019-11-08 PROCEDURE — 85730 THROMBOPLASTIN TIME PARTIAL: CPT

## 2019-11-08 PROCEDURE — 87040 BLOOD CULTURE FOR BACTERIA: CPT

## 2019-11-08 PROCEDURE — 84436 ASSAY OF TOTAL THYROXINE: CPT

## 2019-11-08 PROCEDURE — 93010 ELECTROCARDIOGRAM REPORT: CPT | Performed by: INTERNAL MEDICINE

## 2019-11-08 PROCEDURE — 6360000002 HC RX W HCPCS: Performed by: INTERNAL MEDICINE

## 2019-11-08 PROCEDURE — 71045 X-RAY EXAM CHEST 1 VIEW: CPT

## 2019-11-08 RX ORDER — ACETAMINOPHEN 325 MG/1
650 TABLET ORAL EVERY 6 HOURS PRN
Status: DISCONTINUED | OUTPATIENT
Start: 2019-11-08 | End: 2019-11-10 | Stop reason: HOSPADM

## 2019-11-08 RX ORDER — SODIUM CHLORIDE 9 MG/ML
INJECTION, SOLUTION INTRAVENOUS CONTINUOUS
Status: DISCONTINUED | OUTPATIENT
Start: 2019-11-08 | End: 2019-11-10 | Stop reason: HOSPADM

## 2019-11-08 RX ORDER — 0.9 % SODIUM CHLORIDE 0.9 %
30 INTRAVENOUS SOLUTION INTRAVENOUS ONCE
Status: DISCONTINUED | OUTPATIENT
Start: 2019-11-08 | End: 2019-11-08

## 2019-11-08 RX ORDER — 0.9 % SODIUM CHLORIDE 0.9 %
1000 INTRAVENOUS SOLUTION INTRAVENOUS ONCE
Status: COMPLETED | OUTPATIENT
Start: 2019-11-08 | End: 2019-11-08

## 2019-11-08 RX ORDER — LEVOTHYROXINE SODIUM 0.05 MG/1
50 TABLET ORAL EVERY MORNING
Status: DISCONTINUED | OUTPATIENT
Start: 2019-11-09 | End: 2019-11-10 | Stop reason: HOSPADM

## 2019-11-08 RX ORDER — SODIUM CHLORIDE 0.9 % (FLUSH) 0.9 %
10 SYRINGE (ML) INJECTION PRN
Status: DISCONTINUED | OUTPATIENT
Start: 2019-11-08 | End: 2019-11-10 | Stop reason: HOSPADM

## 2019-11-08 RX ORDER — TERAZOSIN 1 MG/1
5 CAPSULE ORAL NIGHTLY
Status: DISCONTINUED | OUTPATIENT
Start: 2019-11-08 | End: 2019-11-10 | Stop reason: HOSPADM

## 2019-11-08 RX ORDER — SODIUM CHLORIDE 0.9 % (FLUSH) 0.9 %
10 SYRINGE (ML) INJECTION EVERY 12 HOURS SCHEDULED
Status: DISCONTINUED | OUTPATIENT
Start: 2019-11-08 | End: 2019-11-10 | Stop reason: HOSPADM

## 2019-11-08 RX ADMIN — TERAZOSIN HYDROCHLORIDE 5 MG: 1 CAPSULE ORAL at 20:59

## 2019-11-08 RX ADMIN — SODIUM CHLORIDE: 9 INJECTION, SOLUTION INTRAVENOUS at 17:30

## 2019-11-08 RX ADMIN — SODIUM CHLORIDE 1000 ML: 9 INJECTION, SOLUTION INTRAVENOUS at 13:54

## 2019-11-08 RX ADMIN — CEFEPIME HYDROCHLORIDE 2 G: 2 INJECTION, POWDER, FOR SOLUTION INTRAVENOUS at 16:07

## 2019-11-08 RX ADMIN — ENOXAPARIN SODIUM 40 MG: 40 INJECTION SUBCUTANEOUS at 17:31

## 2019-11-09 LAB
ADENOVIRUS BY PCR: NOT DETECTED
ALBUMIN SERPL-MCNC: 3.3 G/DL (ref 3.5–5.2)
ALP BLD-CCNC: 53 U/L (ref 40–129)
ALT SERPL-CCNC: 7 U/L (ref 0–40)
ANION GAP SERPL CALCULATED.3IONS-SCNC: 13 MMOL/L (ref 7–16)
AST SERPL-CCNC: 17 U/L (ref 0–39)
BILIRUB SERPL-MCNC: 0.4 MG/DL (ref 0–1.2)
BORDETELLA PARAPERTUSSIS BY PCR: NOT DETECTED
BORDETELLA PERTUSSIS BY PCR: NOT DETECTED
BUN BLDV-MCNC: 12 MG/DL (ref 8–23)
CALCIUM SERPL-MCNC: 8.2 MG/DL (ref 8.6–10.2)
CHLAMYDOPHILIA PNEUMONIAE BY PCR: NOT DETECTED
CHLORIDE BLD-SCNC: 98 MMOL/L (ref 98–107)
CO2: 20 MMOL/L (ref 22–29)
CORONAVIRUS 229E BY PCR: NOT DETECTED
CORONAVIRUS HKU1 BY PCR: NOT DETECTED
CORONAVIRUS NL63 BY PCR: NOT DETECTED
CORONAVIRUS OC43 BY PCR: NOT DETECTED
CREAT SERPL-MCNC: 1.5 MG/DL (ref 0.7–1.2)
GFR AFRICAN AMERICAN: 55
GFR NON-AFRICAN AMERICAN: 45 ML/MIN/1.73
GLUCOSE BLD-MCNC: 98 MG/DL (ref 74–99)
HCT VFR BLD CALC: 38.3 % (ref 37–54)
HEMOGLOBIN: 12.8 G/DL (ref 12.5–16.5)
HUMAN METAPNEUMOVIRUS BY PCR: NOT DETECTED
HUMAN RHINOVIRUS/ENTEROVIRUS BY PCR: DETECTED
INFLUENZA A BY PCR: NOT DETECTED
INFLUENZA B BY PCR: NOT DETECTED
LACTIC ACID, SEPSIS: 1.3 MMOL/L (ref 0.5–1.9)
MCH RBC QN AUTO: 29.6 PG (ref 26–35)
MCHC RBC AUTO-ENTMCNC: 33.4 % (ref 32–34.5)
MCV RBC AUTO: 88.7 FL (ref 80–99.9)
MYCOPLASMA PNEUMONIAE BY PCR: NOT DETECTED
PARAINFLUENZA VIRUS 1 BY PCR: NOT DETECTED
PARAINFLUENZA VIRUS 2 BY PCR: NOT DETECTED
PARAINFLUENZA VIRUS 3 BY PCR: NOT DETECTED
PARAINFLUENZA VIRUS 4 BY PCR: NOT DETECTED
PDW BLD-RTO: 12.5 FL (ref 11.5–15)
PLATELET # BLD: 173 E9/L (ref 130–450)
PMV BLD AUTO: 9 FL (ref 7–12)
POTASSIUM SERPL-SCNC: 3.7 MMOL/L (ref 3.5–5)
RBC # BLD: 4.32 E12/L (ref 3.8–5.8)
RESPIRATORY SYNCYTIAL VIRUS BY PCR: NOT DETECTED
SODIUM BLD-SCNC: 131 MMOL/L (ref 132–146)
TOTAL PROTEIN: 5.3 G/DL (ref 6.4–8.3)
WBC # BLD: 4.9 E9/L (ref 4.5–11.5)

## 2019-11-09 PROCEDURE — 2580000003 HC RX 258: Performed by: INTERNAL MEDICINE

## 2019-11-09 PROCEDURE — 94640 AIRWAY INHALATION TREATMENT: CPT

## 2019-11-09 PROCEDURE — 6370000000 HC RX 637 (ALT 250 FOR IP): Performed by: INTERNAL MEDICINE

## 2019-11-09 PROCEDURE — 2060000000 HC ICU INTERMEDIATE R&B

## 2019-11-09 PROCEDURE — 83605 ASSAY OF LACTIC ACID: CPT

## 2019-11-09 PROCEDURE — 80053 COMPREHEN METABOLIC PANEL: CPT

## 2019-11-09 PROCEDURE — 2580000003 HC RX 258: Performed by: EMERGENCY MEDICINE

## 2019-11-09 PROCEDURE — 6360000002 HC RX W HCPCS: Performed by: INTERNAL MEDICINE

## 2019-11-09 PROCEDURE — 36415 COLL VENOUS BLD VENIPUNCTURE: CPT

## 2019-11-09 PROCEDURE — 85027 COMPLETE CBC AUTOMATED: CPT

## 2019-11-09 RX ORDER — NICOTINE 21 MG/24HR
1 PATCH, TRANSDERMAL 24 HOURS TRANSDERMAL DAILY
Status: DISCONTINUED | OUTPATIENT
Start: 2019-11-09 | End: 2019-11-10 | Stop reason: HOSPADM

## 2019-11-09 RX ORDER — NICOTINE 21 MG/24HR
1 PATCH, TRANSDERMAL 24 HOURS TRANSDERMAL DAILY
Status: DISCONTINUED | OUTPATIENT
Start: 2019-11-09 | End: 2019-11-09

## 2019-11-09 RX ORDER — IPRATROPIUM BROMIDE AND ALBUTEROL SULFATE 2.5; .5 MG/3ML; MG/3ML
1 SOLUTION RESPIRATORY (INHALATION)
Status: DISCONTINUED | OUTPATIENT
Start: 2019-11-09 | End: 2019-11-10 | Stop reason: HOSPADM

## 2019-11-09 RX ADMIN — TERAZOSIN HYDROCHLORIDE 5 MG: 1 CAPSULE ORAL at 20:13

## 2019-11-09 RX ADMIN — CEFEPIME HYDROCHLORIDE 2 G: 2 INJECTION, POWDER, FOR SOLUTION INTRAVENOUS at 15:30

## 2019-11-09 RX ADMIN — LEVOTHYROXINE SODIUM 50 MCG: 50 TABLET ORAL at 09:46

## 2019-11-09 RX ADMIN — Medication 10 ML: at 09:46

## 2019-11-09 RX ADMIN — ENOXAPARIN SODIUM 40 MG: 40 INJECTION SUBCUTANEOUS at 09:46

## 2019-11-09 RX ADMIN — IPRATROPIUM BROMIDE AND ALBUTEROL SULFATE 1 AMPULE: .5; 3 SOLUTION RESPIRATORY (INHALATION) at 15:57

## 2019-11-09 RX ADMIN — CEFEPIME HYDROCHLORIDE 2 G: 2 INJECTION, POWDER, FOR SOLUTION INTRAVENOUS at 04:42

## 2019-11-09 RX ADMIN — IPRATROPIUM BROMIDE AND ALBUTEROL SULFATE 1 AMPULE: .5; 3 SOLUTION RESPIRATORY (INHALATION) at 20:40

## 2019-11-09 RX ADMIN — SODIUM CHLORIDE: 9 INJECTION, SOLUTION INTRAVENOUS at 03:00

## 2019-11-09 RX ADMIN — SODIUM CHLORIDE: 9 INJECTION, SOLUTION INTRAVENOUS at 15:16

## 2019-11-10 VITALS
BODY MASS INDEX: 23.54 KG/M2 | DIASTOLIC BLOOD PRESSURE: 65 MMHG | OXYGEN SATURATION: 96 % | TEMPERATURE: 97.7 F | HEIGHT: 67 IN | SYSTOLIC BLOOD PRESSURE: 159 MMHG | WEIGHT: 150 LBS | HEART RATE: 54 BPM | RESPIRATION RATE: 14 BRPM

## 2019-11-10 PROBLEM — E86.0 DEHYDRATION: Status: ACTIVE | Noted: 2019-11-10

## 2019-11-10 PROBLEM — J20.6 ACUTE BRONCHITIS DUE TO RHINOVIRUS: Status: ACTIVE | Noted: 2019-06-18

## 2019-11-10 LAB
ALBUMIN SERPL-MCNC: 3.2 G/DL (ref 3.5–5.2)
ALP BLD-CCNC: 52 U/L (ref 40–129)
ALT SERPL-CCNC: 8 U/L (ref 0–40)
ANION GAP SERPL CALCULATED.3IONS-SCNC: 15 MMOL/L (ref 7–16)
AST SERPL-CCNC: 19 U/L (ref 0–39)
BILIRUB SERPL-MCNC: 0.3 MG/DL (ref 0–1.2)
BUN BLDV-MCNC: 12 MG/DL (ref 8–23)
CALCIUM SERPL-MCNC: 8.3 MG/DL (ref 8.6–10.2)
CHLORIDE BLD-SCNC: 103 MMOL/L (ref 98–107)
CO2: 20 MMOL/L (ref 22–29)
CREAT SERPL-MCNC: 1.3 MG/DL (ref 0.7–1.2)
GFR AFRICAN AMERICAN: >60
GFR NON-AFRICAN AMERICAN: 53 ML/MIN/1.73
GLUCOSE BLD-MCNC: 104 MG/DL (ref 74–99)
HCT VFR BLD CALC: 38.3 % (ref 37–54)
HEMOGLOBIN: 12.7 G/DL (ref 12.5–16.5)
MCH RBC QN AUTO: 29.5 PG (ref 26–35)
MCHC RBC AUTO-ENTMCNC: 33.2 % (ref 32–34.5)
MCV RBC AUTO: 89.1 FL (ref 80–99.9)
PDW BLD-RTO: 12.8 FL (ref 11.5–15)
PLATELET # BLD: 181 E9/L (ref 130–450)
PMV BLD AUTO: 9 FL (ref 7–12)
POTASSIUM SERPL-SCNC: 3.4 MMOL/L (ref 3.5–5)
RBC # BLD: 4.3 E12/L (ref 3.8–5.8)
SODIUM BLD-SCNC: 138 MMOL/L (ref 132–146)
TOTAL PROTEIN: 5.5 G/DL (ref 6.4–8.3)
WBC # BLD: 4.2 E9/L (ref 4.5–11.5)

## 2019-11-10 PROCEDURE — 85027 COMPLETE CBC AUTOMATED: CPT

## 2019-11-10 PROCEDURE — 36415 COLL VENOUS BLD VENIPUNCTURE: CPT

## 2019-11-10 PROCEDURE — 6370000000 HC RX 637 (ALT 250 FOR IP): Performed by: INTERNAL MEDICINE

## 2019-11-10 PROCEDURE — 2580000003 HC RX 258: Performed by: EMERGENCY MEDICINE

## 2019-11-10 PROCEDURE — 80053 COMPREHEN METABOLIC PANEL: CPT

## 2019-11-10 PROCEDURE — 6360000002 HC RX W HCPCS: Performed by: INTERNAL MEDICINE

## 2019-11-10 PROCEDURE — 94640 AIRWAY INHALATION TREATMENT: CPT

## 2019-11-10 PROCEDURE — 2580000003 HC RX 258: Performed by: INTERNAL MEDICINE

## 2019-11-10 RX ORDER — POTASSIUM CHLORIDE 20 MEQ/1
20 TABLET, EXTENDED RELEASE ORAL ONCE
Status: COMPLETED | OUTPATIENT
Start: 2019-11-10 | End: 2019-11-10

## 2019-11-10 RX ADMIN — LEVOTHYROXINE SODIUM 50 MCG: 50 TABLET ORAL at 09:24

## 2019-11-10 RX ADMIN — ENOXAPARIN SODIUM 40 MG: 40 INJECTION SUBCUTANEOUS at 09:24

## 2019-11-10 RX ADMIN — Medication 10 ML: at 09:24

## 2019-11-10 RX ADMIN — IPRATROPIUM BROMIDE AND ALBUTEROL SULFATE 1 AMPULE: .5; 3 SOLUTION RESPIRATORY (INHALATION) at 08:01

## 2019-11-10 RX ADMIN — SODIUM CHLORIDE: 9 INJECTION, SOLUTION INTRAVENOUS at 02:37

## 2019-11-10 RX ADMIN — POTASSIUM CHLORIDE 20 MEQ: 20 TABLET, EXTENDED RELEASE ORAL at 11:55

## 2019-11-10 RX ADMIN — IPRATROPIUM BROMIDE AND ALBUTEROL SULFATE 1 AMPULE: .5; 3 SOLUTION RESPIRATORY (INHALATION) at 12:28

## 2019-11-13 LAB
BLOOD CULTURE, ROUTINE: NORMAL
CULTURE, BLOOD 2: NORMAL

## 2019-12-10 PROBLEM — E86.0 DEHYDRATION: Status: RESOLVED | Noted: 2019-11-10 | Resolved: 2019-12-10

## 2020-01-22 ENCOUNTER — HOSPITAL ENCOUNTER (OUTPATIENT)
Dept: ULTRASOUND IMAGING | Age: 80
Discharge: HOME | End: 2020-01-24
Payer: OTHER GOVERNMENT

## 2020-01-22 PROCEDURE — 93880 EXTRACRANIAL BILAT STUDY: CPT

## 2022-01-13 ENCOUNTER — OFFICE VISIT (OUTPATIENT)
Dept: FAMILY MEDICINE CLINIC | Age: 82
End: 2022-01-13
Payer: MEDICARE

## 2022-01-13 VITALS
WEIGHT: 141.6 LBS | BODY MASS INDEX: 22.18 KG/M2 | DIASTOLIC BLOOD PRESSURE: 80 MMHG | TEMPERATURE: 98.1 F | HEART RATE: 78 BPM | SYSTOLIC BLOOD PRESSURE: 122 MMHG

## 2022-01-13 DIAGNOSIS — R09.81 NASAL CONGESTION: ICD-10-CM

## 2022-01-13 DIAGNOSIS — R05.9 COUGH: Primary | ICD-10-CM

## 2022-01-13 LAB
INFLUENZA A ANTIBODY: NORMAL
INFLUENZA B ANTIBODY: NORMAL
Lab: NORMAL
QC PASS/FAIL: NORMAL
SARS-COV-2 RDRP RESP QL NAA+PROBE: NEGATIVE

## 2022-01-13 PROCEDURE — 99203 OFFICE O/P NEW LOW 30 MIN: CPT | Performed by: PHYSICIAN ASSISTANT

## 2022-01-13 PROCEDURE — 87804 INFLUENZA ASSAY W/OPTIC: CPT | Performed by: PHYSICIAN ASSISTANT

## 2022-01-13 PROCEDURE — 87635 SARS-COV-2 COVID-19 AMP PRB: CPT | Performed by: PHYSICIAN ASSISTANT

## 2022-01-13 RX ORDER — TERAZOSIN 5 MG/1
CAPSULE ORAL
COMMUNITY
End: 2022-01-13

## 2022-01-13 NOTE — PROGRESS NOTES
22  Carl Huntley : 1940 Sex: male  Age [de-identified] y.o. Subjective:  Chief Complaint   Patient presents with    Congestion    Cough         HPI:   Carl Huntley , [de-identified] y.o. male presents to [facility] care for evaluation of cough, congestion    HPI  CC:    Chief Complaint   Patient presents with    Congestion    Cough       Onset: 2 days    Fever:  No     Vaccine: Yes   Booster:  No   Flu shot:  No   Exposure:  No     Treatments:  Cough medication for home    Aggravating or Alleviating factors: The patient is had a cough but no real sputum production. The patient does not have any fever, chills. The patient is eating and drinking normally. Just has a little bit of cough and congestion. He is due for procedure tomorrow for a lung biopsy. The patient otherwise seems to be  Chest pain:  No SOB:  No Myalgias: No Loss of smell:  No   Loss of Taste:  No Dizziness:  No  Fatigue:  No Headache:  No      COVID previously: No   Miscellaneous: Asymptomatic        ROS:   Unless otherwise stated in this report the patient's positive and negative responses for review of systems for constitutional, eyes, ENT, cardiovascular, respiratory, gastrointestinal, neurological, , musculoskeletal, and integument systems and related systems to the presenting problem are either stated in the history of present illness or were not pertinent or were negative for the symptoms and/or complaints related to the presenting medical problem. Positives and pertinent negatives as per HPI. All others reviewed and are negative.       PMH:     Past Medical History:   Diagnosis Date    Cancer [facility])     prostate    Dehydration 11/10/2019    Hypertension     Inguinal hernia     Mediastinal adenopathy 10/2017    Per[facility] vascular [facility] ([facility] [address]     Thyroid disease        Past Surgical History:   Procedure Laterality Date    ABDOMEN SURGERY      APPENDECTOMY      BALLOON ANGIOPLASTY, ARTERY  2014    [facility] 8x10 left common iliac Dr Aisha Graf  10/27/2017    COLONOSCOPY  20106    HERNIA REPAIR Right     R inguinal    TONSILLECTOMY      VASCULAR SURGERY      stent left leg       Family History   Problem Relation Age of Onset    Cancer Brother     Heart Failure Mother     Pacemaker Mother     Stroke Father        Medications:     Current Outpatient Medications:     acetaminophen (TYLENOL) 325 MG tablet, Take 650 mg by mouth every 6 hours as needed for Pain, Disp: , Rfl:     levothyroxine (SYNTHROID) 50 MCG tablet, Take 50 mcg by mouth every morning , Disp: , Rfl:     terazosin (HYTRIN) 5 MG capsule, Take 5 mg by mouth nightly , Disp: , Rfl:     Allergies:   No Known Allergies    Social History:     Social History     Tobacco Use    Smoking status: Current Every Day Smoker     Packs/day: 0.50     Years: 63.00     Pack years: 31.50     Types: Cigarettes     Start date: 6/1/1956    Smokeless tobacco: Current User     Types: Snuff     Last attempt to quit: 11/21/2014   Vaping Use    Vaping Use: Never used   Substance Use Topics    Alcohol use: Yes     Alcohol/week: 14.0 standard drinks     Types: 14 Cans of beer per week     Comment: 2 beer daily    Drug use: Never       Patient lives at home. Physical Exam:     Vitals:    01/13/22 1128   BP: 122/80   Site: Right Upper Arm   Position: Sitting   Pulse: 78   Temp: 98.1 °F (36.7 °C)   TempSrc: Temporal   Weight: 141 lb 9.6 oz (64.2 kg)       Exam:  Physical Exam  Nurse's notes and vital signs reviewed. The patient is not hypoxic. ? General: Alert, no acute distress, patient resting comfortably Patient is not toxic or lethargic. Skin: Warm, intact, no pallor noted. There is no evidence of rash at this time. Head: Normocephalic, atraumatic  Eye: Normal conjunctiva  Ears, Nose, Throat: Right tympanic membrane clear, left tympanic membrane clear. No drainage or discharge noted. No pre- or post-auricular tenderness, erythema, or swelling noted.    No rhinorrhea or congestion noted. Posterior oropharynx shows no erythema, tonsillar hypertrophy, or exudate. the uvula is midline. No trismus or drooling is noted. Moist mucous membranes. Cardiovascular: Regular Rate and Rhythm  Respiratory: No acute distress, no rhonchi, wheezing or crackles noted. No stridor or retractions are noted. Neurological: A&O x4, normal speech  Psychiatric: Cooperative         Testing:     Results for orders placed or performed in visit on 01/13/22   POCT COVID-19, Rapid   Result Value Ref Range    SARS-COV-2, RdRp gene Negative Negative    Lot Number [id number]     QC Pass/Fail pass    POCT Influenza A/B   Result Value Ref Range    Influenza A Ab neg     Influenza B Ab neg            Medical Decision Making:     Vital signs reviewed    Past medical history reviewed. Allergies reviewed. Medications reviewed. Patient on arrival does not appear to be in any apparent distress or discomfort. The patient has been seen and evaluated. The patient does not appear to be toxic or lethargic. The patient had relatively benign physical exam.    Rapid COVID RD RP gene was negative. Influenza was negative. They will contact the [place] where the patient is having this done the mom know that the patient is having cough and congestion. The patient was educated on the proper dosage of motrin and tylenol and the appropriate intervals of each. The patient is to increase fluid intake over the next several days. The patient is to use OTC decongestant as needed. The patient is to return to express care or go directly to the emergency department should any of the signs or symptoms worsen. The patient is to followup with primary care physician in 2-3 days for repeat evaluation. The patient has no other questions or concerns at this time the patient will be discharged home. Clinical Impression:   Janis Carrasquillo was seen today for congestion and cough.     Diagnoses and all orders for this visit:    Cough  -     POCT COVID-19, Rapid  -     POCT Influenza A/B    Nasal congestion        The patient is to call for any concerns or return if any of the signs or symptoms worsen. The patient is to follow-up with PCP in the next 2-3 days for repeat evaluation repeat assessment or go directly to the emergency department.      SIGNATURE: Caitie Painting III, PA-C

## 2022-04-28 ENCOUNTER — APPOINTMENT (OUTPATIENT)
Dept: GENERAL RADIOLOGY | Age: 82
End: 2022-04-28
Payer: OTHER GOVERNMENT

## 2022-04-28 ENCOUNTER — HOSPITAL ENCOUNTER (EMERGENCY)
Age: 82
Discharge: HOME | End: 2022-04-28
Attending: EMERGENCY MEDICINE
Payer: OTHER GOVERNMENT

## 2022-04-28 ENCOUNTER — APPOINTMENT (OUTPATIENT)
Dept: CT IMAGING | Age: 82
End: 2022-04-28
Payer: OTHER GOVERNMENT

## 2022-04-28 VITALS
DIASTOLIC BLOOD PRESSURE: 78 MMHG | SYSTOLIC BLOOD PRESSURE: 127 MMHG | HEART RATE: 75 BPM | BODY MASS INDEX: 22.13 KG/M2 | OXYGEN SATURATION: 99 % | HEIGHT: 67 IN | TEMPERATURE: 97 F | WEIGHT: 141 LBS | RESPIRATION RATE: 16 BRPM

## 2022-04-28 DIAGNOSIS — R53.83 OTHER FATIGUE: Primary | ICD-10-CM

## 2022-04-28 DIAGNOSIS — Z85.118 HISTORY OF LUNG CANCER: ICD-10-CM

## 2022-04-28 DIAGNOSIS — R42 LIGHT HEADEDNESS: ICD-10-CM

## 2022-04-28 LAB
ALBUMIN SERPL-MCNC: 4 G/DL (ref 3.5–5.2)
ALP BLD-CCNC: 106 U/L (ref 40–129)
ALT SERPL-CCNC: 10 U/L (ref 0–40)
ANION GAP SERPL CALCULATED.3IONS-SCNC: 9 MMOL/L (ref 7–16)
AST SERPL-CCNC: 14 U/L (ref 0–39)
BASOPHILS ABSOLUTE: 0.04 E9/L (ref 0–0.2)
BASOPHILS RELATIVE PERCENT: 0.7 % (ref 0–2)
BILIRUB SERPL-MCNC: 0.5 MG/DL (ref 0–1.2)
BILIRUBIN URINE: NEGATIVE
BLOOD, URINE: NEGATIVE
BUN BLDV-MCNC: 19 MG/DL (ref 6–23)
CALCIUM SERPL-MCNC: 9.6 MG/DL (ref 8.6–10.2)
CHLORIDE BLD-SCNC: 101 MMOL/L (ref 98–107)
CLARITY: CLEAR
CO2: 27 MMOL/L (ref 22–29)
COLOR: YELLOW
CREAT SERPL-MCNC: 1.2 MG/DL (ref 0.7–1.2)
EKG ATRIAL RATE: 61 BPM
EKG P AXIS: 61 DEGREES
EKG P-R INTERVAL: 166 MS
EKG Q-T INTERVAL: 394 MS
EKG QRS DURATION: 92 MS
EKG QTC CALCULATION (BAZETT): 396 MS
EKG R AXIS: 3 DEGREES
EKG T AXIS: 40 DEGREES
EKG VENTRICULAR RATE: 61 BPM
EOSINOPHILS ABSOLUTE: 0.07 E9/L (ref 0.05–0.5)
EOSINOPHILS RELATIVE PERCENT: 1.2 % (ref 0–6)
GFR AFRICAN AMERICAN: >60
GFR NON-AFRICAN AMERICAN: 58 ML/MIN/1.73
GLUCOSE BLD-MCNC: 102 MG/DL (ref 74–99)
GLUCOSE URINE: NEGATIVE MG/DL
HCT VFR BLD CALC: 40.2 % (ref 37–54)
HEMOGLOBIN: 13 G/DL (ref 12.5–16.5)
IMMATURE GRANULOCYTES #: 0.03 E9/L
IMMATURE GRANULOCYTES %: 0.5 % (ref 0–5)
KETONES, URINE: NEGATIVE MG/DL
LEUKOCYTE ESTERASE, URINE: NEGATIVE
LYMPHOCYTES ABSOLUTE: 1.01 E9/L (ref 1.5–4)
LYMPHOCYTES RELATIVE PERCENT: 17 % (ref 20–42)
MAGNESIUM: 2 MG/DL (ref 1.6–2.6)
MCH RBC QN AUTO: 28 PG (ref 26–35)
MCHC RBC AUTO-ENTMCNC: 32.3 % (ref 32–34.5)
MCV RBC AUTO: 86.6 FL (ref 80–99.9)
MONOCYTES ABSOLUTE: 0.63 E9/L (ref 0.1–0.95)
MONOCYTES RELATIVE PERCENT: 10.6 % (ref 2–12)
NEUTROPHILS ABSOLUTE: 4.16 E9/L (ref 1.8–7.3)
NEUTROPHILS RELATIVE PERCENT: 70 % (ref 43–80)
NITRITE, URINE: NEGATIVE
PDW BLD-RTO: 14 FL (ref 11.5–15)
PH UA: 6 (ref 5–9)
PLATELET # BLD: 312 E9/L (ref 130–450)
PMV BLD AUTO: 8.7 FL (ref 7–12)
POTASSIUM SERPL-SCNC: 4.3 MMOL/L (ref 3.5–5)
PROTEIN UA: NEGATIVE MG/DL
RBC # BLD: 4.64 E12/L (ref 3.8–5.8)
SODIUM BLD-SCNC: 137 MMOL/L (ref 132–146)
SPECIFIC GRAVITY UA: 1.01 (ref 1–1.03)
TOTAL PROTEIN: 6.6 G/DL (ref 6.4–8.3)
TROPONIN, HIGH SENSITIVITY: 23 NG/L (ref 0–11)
TROPONIN, HIGH SENSITIVITY: 24 NG/L (ref 0–11)
UROBILINOGEN, URINE: 0.2 E.U./DL
WBC # BLD: 5.9 E9/L (ref 4.5–11.5)

## 2022-04-28 PROCEDURE — 71045 X-RAY EXAM CHEST 1 VIEW: CPT

## 2022-04-28 PROCEDURE — 80053 COMPREHEN METABOLIC PANEL: CPT

## 2022-04-28 PROCEDURE — 93005 ELECTROCARDIOGRAM TRACING: CPT | Performed by: NURSE PRACTITIONER

## 2022-04-28 PROCEDURE — 6370000000 HC RX 637 (ALT 250 FOR IP): Performed by: STUDENT IN AN ORGANIZED HEALTH CARE EDUCATION/TRAINING PROGRAM

## 2022-04-28 PROCEDURE — 93010 ELECTROCARDIOGRAM REPORT: CPT | Performed by: INTERNAL MEDICINE

## 2022-04-28 PROCEDURE — 84484 ASSAY OF TROPONIN QUANT: CPT

## 2022-04-28 PROCEDURE — 81003 URINALYSIS AUTO W/O SCOPE: CPT

## 2022-04-28 PROCEDURE — 85025 COMPLETE CBC W/AUTO DIFF WBC: CPT

## 2022-04-28 PROCEDURE — 83735 ASSAY OF MAGNESIUM: CPT

## 2022-04-28 PROCEDURE — 70450 CT HEAD/BRAIN W/O DYE: CPT

## 2022-04-28 PROCEDURE — 99285 EMERGENCY DEPT VISIT HI MDM: CPT

## 2022-04-28 RX ORDER — MECLIZINE HCL 12.5 MG/1
25 TABLET ORAL ONCE
Status: COMPLETED | OUTPATIENT
Start: 2022-04-28 | End: 2022-04-28

## 2022-04-28 RX ADMIN — MECLIZINE HCL 12.5 MG 25 MG: 12.5 TABLET ORAL at 15:49

## 2022-04-28 ASSESSMENT — ENCOUNTER SYMPTOMS
WHEEZING: 0
CONSTIPATION: 0
STRIDOR: 0
NAUSEA: 1
DIARRHEA: 0
VOMITING: 1
COUGH: 0
ABDOMINAL DISTENTION: 0
ABDOMINAL PAIN: 0
COLOR CHANGE: 0
BACK PAIN: 0

## 2022-04-28 NOTE — ED PROVIDER NOTES
[facility] ENCOUNTER      Pt Name: Dian Khan  MRN: [id number]  [place] 1940  Date of evaluation: 4/28/2022      CHIEF COMPLAINT       Chief Complaint   Patient presents with    Fatigue     mechanical fall 2 days ago no injury Pt had lt lung lobectomy in Feb    Dizziness        HPI  Dian Khan is a [de-identified] y.o. male with a pertinent past medical history of of partial left lung lobectomy in February, was biopsied and found to have cancer, not on chemotherapy presents complaints of fatigue and dizziness for the last 3 days. Per patient he has just felt tired and lightheaded. No vertiginous symptoms. She felt weak ever since the lobectomy. Per patient's wife he has been walking over slumped over with a shuffling gait and going to his right side. No alleviating or exacerbating factors. Not take any medications or measures alleviate symptoms. Denies any recent fevers, chills, nausea, vomiting, chest pain, focal logic deficits, blurred vision, abdominal pain, flank pain, dysuria, hematuria, diarrhea, constipation, new rashes or sores. Except as noted above the remainder of the review of systems was reviewed and negative. Review of Systems   Constitutional: Positive for fatigue. Negative for activity change, appetite change, diaphoresis, fever and unexpected weight change. HENT: Negative for congestion. Eyes: Negative for visual disturbance. Respiratory: Negative for cough, shortness of breath, wheezing and stridor. Cardiovascular: Negative for chest pain, palpitations and leg swelling. Gastrointestinal: Positive for nausea and vomiting. Negative for abdominal distention, abdominal pain, constipation and diarrhea. Endocrine: Negative for polyphagia and polyuria. Genitourinary: Negative for dysuria and hematuria. Musculoskeletal: Negative for back pain.    Skin: Negative for color change, pallor, rash and wound. Neurological: Positive for light-headedness. Negative for dizziness and syncope. Hematological: Negative for adenopathy. Does not bruise/bleed easily. Psychiatric/Behavioral: Negative for agitation. Physical Exam  Vitals and nursing note reviewed. Constitutional:       General: He is not in acute distress. Appearance: Normal appearance. He is not ill-appearing or diaphoretic. HENT:      Head: Normocephalic and atraumatic. Right Ear: External ear normal.      Left Ear: External ear normal.      Nose: Nose normal.      Mouth/Throat:      Mouth: Mucous membranes are moist.      Pharynx: Oropharynx is clear. Eyes:      Extraocular Movements: Extraocular movements intact. Pupils: Pupils are equal, round, and reactive to light. Cardiovascular:      Rate and Rhythm: Normal rate and regular rhythm. Pulses: Normal pulses. Heart sounds: Normal heart sounds. Pulmonary:      Effort: Pulmonary effort is normal.      Breath sounds: Normal breath sounds. Abdominal:      General: Abdomen is flat. Palpations: Abdomen is soft. Tenderness: There is no abdominal tenderness. There is no right CVA tenderness, left CVA tenderness or rebound. Negative signs include Martínez's sign, Rovsing's sign and McBurney's sign. Musculoskeletal:         General: Normal range of motion. Cervical back: Normal range of motion. Skin:     General: Skin is warm and dry. Capillary Refill: Capillary refill takes less than 2 seconds. Comments: Clean healed surgical incision sites in patient's left chest.   Neurological:      General: No focal deficit present. Mental Status: He is alert and oriented to person, place, and time. Comments: Cranial nerves II through XII grossly intact. No dysmetria no dysarthria no dysdiadochokinesia. I walked the patient. He has a shuffling stooped over gait. He is not going and falling over to 1 side or the other.     Initial NIH is 0.   Psychiatric:         Mood and Affect: Mood normal.          Procedures     MDM  Number of Diagnoses or Management Options  History of lung cancer  Light headedness  Other fatigue  Diagnosis management comments: 60-year-old male pertinent past medical history of left lung lobectomy, the mass was biopsied found to be cancer, patient is not on chemotherapy, presents with complaints of fatigue and dizziness. Denies any chest pain, shortness of breath, near syncopal episodes. States that she is felt more fatigued and tired as of late. Especially with the last 3 days. Vitals within normal limits. NIH of 0. Cranial nerves II through XII grossly intact with no dysmetria no dysarthria no dysdiadochokinesia. He has a stooped over gait with shuffling. He is not ataxic. He walks in a straight line but his arms are held at his sides. Diagnostic labs and imaging interpreted reviewed. EKG showed normal sinus rhythm. No signs of ischemia. Delta troponin negative. CT head negative for any acute process. Labs within normal limits. Patient was informed of all the results of evaluation. He was given meclizine and IV fluids in the department. He states that he felt better after resuscitation. He is agreeable plan for discharge. Patient given follow-up with his primary care physician as well as neurology. With the patient's stooped walk, shuffling gait, I have concerns that the patient may have early signs of Parkinson's which I have expressed to the patient. Patient is awake alert, hemodynamic stable, afebrile and in no respiratory distress. Discussed with patient plan for close outpatient follow-up with the patient's PCP as well as return precautions and the patient understands and agrees to the plan. Patient was seen with attending. ED Course as of 04/29/22 1008   Thu Apr 28, 2022   1416 XR CHEST PORTABLE  History of left lobectomy.  [JV]      ED Course User Index  [JV] Jaquan Ramos MD --------------------------------------------- PAST HISTORY ---------------------------------------------  Past Medical History:  has a past medical history of Cancer ([address] Dehydration, Hypertension, Inguinal hernia, Mediastinal adenopathy, Peripheral vascular disease ([address] and Thyroid disease. Past Surgical History:  has a past surgical history that includes Abdomen surgery; Balloon angioplasty, artery (8/6/2014); hernia repair (Right); vascular surgery; Appendectomy; Tonsillectomy; Colonoscopy (20106); and bronchoscopy (10/27/2017). Social History:  reports that he has been smoking cigarettes. He started smoking about 65 years ago. He has a 31.50 pack-year smoking history. His smokeless tobacco use includes snuff. He reports current alcohol use of about 14.0 standard drinks of alcohol per week. He reports that he does not use drugs. Family History: family history includes Cancer in his brother; Heart Failure in his mother; Pacemaker in his mother; Stroke in his father. The patients home medications have been reviewed. Allergies: Patient has no known allergies.     -------------------------------------------------- RESULTS -------------------------------------------------  Labs:  Results for orders placed or performed during the hospital encounter of 04/28/22   CBC with Auto Differential   Result Value Ref Range    WBC 5.9 4.5 - 11.5 E9/L    RBC 4.64 3.80 - 5.80 E12/L    Hemoglobin 13.0 12.5 - 16.5 g/dL    Hematocrit 40.2 37.0 - 54.0 %    MCV 86.6 80.0 - 99.9 fL    MCH 28.0 26.0 - 35.0 pg    MCHC 32.3 32.0 - 34.5 %    RDW 14.0 11.5 - 15.0 fL    Platelets 133 858 - 396 E9/L    MPV 8.7 7.0 - 12.0 fL    Neutrophils % 70.0 43.0 - 80.0 %    Immature Granulocytes % 0.5 0.0 - 5.0 %    Lymphocytes % 17.0 (L) 20.0 - 42.0 %    Monocytes % 10.6 2.0 - 12.0 %    Eosinophils % 1.2 0.0 - 6.0 %    Basophils % 0.7 0.0 - 2.0 %    Neutrophils Absolute 4.16 1.80 - 7.30 E9/L    Immature Granulocytes # 0.03 E9/L    Lymphocytes Absolute 1.01 (L) 1.50 - 4.00 E9/L    Monocytes Absolute 0.63 0.10 - 0.95 E9/L    Eosinophils Absolute 0.07 0.05 - 0.50 E9/L    Basophils Absolute 0.04 0.00 - 0.20 E9/L   Comprehensive Metabolic Panel   Result Value Ref Range    Sodium 137 132 - 146 mmol/L    Potassium 4.3 3.5 - 5.0 mmol/L    Chloride 101 98 - 107 mmol/L    CO2 27 22 - 29 mmol/L    Anion Gap 9 7 - 16 mmol/L    Glucose 102 (H) 74 - 99 mg/dL    BUN 19 6 - 23 mg/dL    CREATININE 1.2 0.7 - 1.2 mg/dL    GFR Non-African American 58 >=60 mL/min/1.73    GFR African American >60     Calcium 9.6 8.6 - 10.2 mg/dL    Total Protein 6.6 6.4 - 8.3 g/dL    Albumin 4.0 3.5 - 5.2 g/dL    Total Bilirubin 0.5 0.0 - 1.2 mg/dL    Alkaline Phosphatase 106 40 - 129 U/L    ALT 10 0 - 40 U/L    AST 14 0 - 39 U/L   Urinalysis   Result Value Ref Range    Color, UA Yellow Straw/Yellow    Clarity, UA Clear Clear    Glucose, Ur Negative Negative mg/dL    Bilirubin Urine Negative Negative    Ketones, Urine Negative Negative mg/dL    Specific Gravity, UA 1.010 1.005 - 1.030    Blood, Urine Negative Negative    pH, UA 6.0 5.0 - 9.0    Protein, UA Negative Negative mg/dL    Urobilinogen, Urine 0.2 <2.0 E.U./dL    Nitrite, Urine Negative Negative    Leukocyte Esterase, Urine Negative Negative   Troponin   Result Value Ref Range    Troponin, High Sensitivity 23 (H) 0 - 11 ng/L   Magnesium   Result Value Ref Range    Magnesium 2.0 1.6 - 2.6 mg/dL   Troponin   Result Value Ref Range    Troponin, High Sensitivity 24 (H) 0 - 11 ng/L   EKG 12 Lead   Result Value Ref Range    Ventricular Rate 61 BPM    Atrial Rate 61 BPM    P-R Interval 166 ms    QRS Duration 92 ms    Q-T Interval 394 ms    QTc Calculation (Bazett) 396 ms    P Axis 61 degrees    R Axis 3 degrees    T Axis 40 degrees       ECG:  EKG read inter by me. Heart rate 60. Normal sinus rhythm. Normal axis deviation. QTc 400. No ST elevations or depressions.   Stable as compared to previous EKG.    Radiology:  CT HEAD WO CONTRAST   Final Result   1. There is no acute intracranial abnormality. Specifically, there is no   intracranial hemorrhage. 2. Atrophy and periventricular leukomalacia,         XR CHEST PORTABLE   Final Result   1. There are no findings of pneumonia or failure   2. Postsurgical changes of the left lung with volume loss.             ------------------------- NURSING NOTES AND VITALS REVIEWED ---------------------------  Date / Time Roomed:  4/28/2022  2:13 PM  ED Bed Assignment:  09/09    The nursing notes within the ED encounter and vital signs as below have been reviewed. /78   Pulse 75   Temp 97 °F (36.1 °C) (Temporal)   Resp 16   Ht 5' 7\" (1.702 m)   Wt 141 lb (64 kg)   SpO2 99%   BMI 22.08 kg/m²   Oxygen Saturation Interpretation: normal      ------------------------------------------ PROGRESS NOTES ------------------------------------------    I have spoken with the patient and discussed todays results, in addition to providing specific details for the plan of care and counseling regarding the diagnosis and prognosis. Their questions are answered at this time and they are agreeable with the plan. I discussed at length with them reasons for immediate return here for re evaluation. They will followup with their PCP by calling their office tomorrow. --------------------------------- ADDITIONAL PROVIDER NOTES ---------------------------------  At this time the patient is without objective evidence of an acute process requiring hospitalization or inpatient management. They have remained hemodynamically stable throughout their entire ED visit and are stable for discharge with outpatient follow-up. The plan has been discussed in detail and they are aware of the specific conditions for emergent return, as well as the importance of follow-up. Discharge Medication List as of 4/28/2022  7:14 PM          Diagnosis:  1. Other fatigue    2.  Light headedness 3. History of lung cancer        Disposition:  Patient's disposition: Discharge to home  Patient's condition is stable.       Riki Reyes MD  Resident  04/29/22 1704

## 2022-04-28 NOTE — ED PROVIDER NOTES
FIRST PROVIDER CONTACT ASSESSMENT NOTE           Department of Emergency Medicine                 First Provider Note            22  1:35 PM EDT    Date of Encounter: No admission date for patient encounter. Patient Name: Antonio Jon  : 1940  MRN: [id number]    Chief Complaint: Fatigue (mechanical fall 2 days ago no injury Pt had lt lung lobectomy in Feb) and Dizziness      History of Present Illness:   Antonio Jon is a [de-identified] y.o. male who presents to the ED with spouse for complaints of feeling fatigued and feeling his balance is off for the past 3 days. Wife reports he lost balance and fell and hit the right side 2 days ago and denies any pain with no head or neck injury. She reports he had part of his left lung removed at the [facility] on 2022 and is not on any anticoagulation. Speech is clear and denies any fever, chills, chest pain, shortness of breath, nausea, vomiting, diarrhea, constipation. Focused Physical Exam:  VS:    ED Triage Vitals [22 1327]   BP Temp Temp Source Pulse Resp SpO2 Height Weight   116/77 97 °F (36.1 °C) Temporal 83 16 99 % 5' 7\" (1.702 m) 141 lb (64 kg)        Physical Ex: Constitutional: Alert and non-toxic. Medical History:  has a past medical history of Cancer ([address] Dehydration, Hypertension, Inguinal hernia, Mediastinal adenopathy, Peripheral vascular disease ([address] and Thyroid disease. Surgical History:  has a past surgical history that includes Abdomen surgery; Balloon angioplasty, artery (2014); hernia repair (Right); vascular surgery; Appendectomy; Tonsillectomy; Colonoscopy (); and bronchoscopy (10/27/2017). Social History:  reports that he has been smoking cigarettes. He started smoking about 65 years ago. He has a 31.50 pack-year smoking history. His smokeless tobacco use includes snuff. He reports current alcohol use of about 14.0 standard drinks of alcohol per week. He reports that he does not use drugs.   Family History: family history includes Cancer in his brother; Heart Failure in his mother; Pacemaker in his mother; Stroke in his father. Allergies: Patient has no known allergies.      Initial Plan of Care: Initiate Treatment-Testing, Proceed toTreatment Area When Bed Available for ED Attending/MLP to Continue Care      ---END OF FIRST PROVIDER CONTACT ASSESSMENT NOTE---  Electronically signed by OSBALDO Ayon CNP   DD: 4/28/22      OSBALDO yAon CNP  04/28/22 8991

## 2022-04-29 ASSESSMENT — ENCOUNTER SYMPTOMS: SHORTNESS OF BREATH: 0

## 2022-05-12 ENCOUNTER — HOSPITAL ENCOUNTER (INPATIENT)
Age: 82
LOS: 5 days | Discharge: HOME | DRG: 062 | End: 2022-05-17
Attending: EMERGENCY MEDICINE | Admitting: INTERNAL MEDICINE
Payer: MEDICARE

## 2022-05-12 ENCOUNTER — APPOINTMENT (OUTPATIENT)
Dept: CT IMAGING | Age: 82
DRG: 062 | End: 2022-05-12
Payer: MEDICARE

## 2022-05-12 DIAGNOSIS — I63.522 CEREBROVASCULAR ACCIDENT (CVA) DUE TO OCCLUSION OF LEFT ANTERIOR CEREBRAL ARTERY (HCC): Primary | ICD-10-CM

## 2022-05-12 PROBLEM — I63.9 ACUTE CVA (CEREBROVASCULAR ACCIDENT) (HCC): Status: ACTIVE | Noted: 2022-05-12

## 2022-05-12 LAB
ALBUMIN SERPL-MCNC: 3.5 G/DL (ref 3.5–5.2)
ALP BLD-CCNC: 86 U/L (ref 40–129)
ALT SERPL-CCNC: 11 U/L (ref 0–40)
ANION GAP SERPL CALCULATED.3IONS-SCNC: 14 MMOL/L (ref 7–16)
APTT: 25.8 SEC (ref 24.5–35.1)
AST SERPL-CCNC: 22 U/L (ref 0–39)
BILIRUB SERPL-MCNC: 0.3 MG/DL (ref 0–1.2)
BUN BLDV-MCNC: 18 MG/DL (ref 6–23)
CALCIUM SERPL-MCNC: 8.4 MG/DL (ref 8.6–10.2)
CHLORIDE BLD-SCNC: 104 MMOL/L (ref 98–107)
CO2: 20 MMOL/L (ref 22–29)
CREAT SERPL-MCNC: 1.4 MG/DL (ref 0.7–1.2)
GFR AFRICAN AMERICAN: 59
GFR NON-AFRICAN AMERICAN: 49 ML/MIN/1.73
GLUCOSE BLD-MCNC: 112 MG/DL
GLUCOSE BLD-MCNC: 112 MG/DL (ref 74–99)
HCT VFR BLD CALC: 39.1 % (ref 37–54)
HEMOGLOBIN: 12.5 G/DL (ref 12.5–16.5)
INR BLD: 1.1
MCH RBC QN AUTO: 28 PG (ref 26–35)
MCHC RBC AUTO-ENTMCNC: 32 % (ref 32–34.5)
MCV RBC AUTO: 87.7 FL (ref 80–99.9)
METER GLUCOSE: 112 MG/DL (ref 74–99)
PDW BLD-RTO: 13.7 FL (ref 11.5–15)
PLATELET # BLD: 266 E9/L (ref 130–450)
PMV BLD AUTO: 8.9 FL (ref 7–12)
POTASSIUM SERPL-SCNC: 4.1 MMOL/L (ref 3.5–5)
PROTHROMBIN TIME: 11.4 SEC (ref 9.3–12.4)
RBC # BLD: 4.46 E12/L (ref 3.8–5.8)
REASON FOR REJECTION: NORMAL
REJECTED TEST: NORMAL
SARS-COV-2, NAAT: NOT DETECTED
SODIUM BLD-SCNC: 138 MMOL/L (ref 132–146)
TOTAL PROTEIN: 5.7 G/DL (ref 6.4–8.3)
WBC # BLD: 6.9 E9/L (ref 4.5–11.5)

## 2022-05-12 PROCEDURE — 87635 SARS-COV-2 COVID-19 AMP PRB: CPT

## 2022-05-12 PROCEDURE — 2580000003 HC RX 258: Performed by: EMERGENCY MEDICINE

## 2022-05-12 PROCEDURE — 85730 THROMBOPLASTIN TIME PARTIAL: CPT

## 2022-05-12 PROCEDURE — 3E03317 INTRODUCTION OF OTHER THROMBOLYTIC INTO PERIPHERAL VEIN, PERCUTANEOUS APPROACH: ICD-10-PCS | Performed by: EMERGENCY MEDICINE

## 2022-05-12 PROCEDURE — 6360000004 HC RX CONTRAST MEDICATION: Performed by: RADIOLOGY

## 2022-05-12 PROCEDURE — 4A03X5D MEASUREMENT OF ARTERIAL FLOW, INTRACRANIAL, EXTERNAL APPROACH: ICD-10-PCS | Performed by: RADIOLOGY

## 2022-05-12 PROCEDURE — 70496 CT ANGIOGRAPHY HEAD: CPT

## 2022-05-12 PROCEDURE — 70498 CT ANGIOGRAPHY NECK: CPT

## 2022-05-12 PROCEDURE — 0042T CT BRAIN PERFUSION: CPT | Performed by: RADIOLOGY

## 2022-05-12 PROCEDURE — 70498 CT ANGIOGRAPHY NECK: CPT | Performed by: RADIOLOGY

## 2022-05-12 PROCEDURE — 70450 CT HEAD/BRAIN W/O DYE: CPT | Performed by: RADIOLOGY

## 2022-05-12 PROCEDURE — 37195 THROMBOLYTIC THERAPY STROKE: CPT

## 2022-05-12 PROCEDURE — 6360000002 HC RX W HCPCS: Performed by: EMERGENCY MEDICINE

## 2022-05-12 PROCEDURE — 70496 CT ANGIOGRAPHY HEAD: CPT | Performed by: RADIOLOGY

## 2022-05-12 PROCEDURE — 2580000003 HC RX 258: Performed by: RADIOLOGY

## 2022-05-12 PROCEDURE — 82962 GLUCOSE BLOOD TEST: CPT

## 2022-05-12 PROCEDURE — 85610 PROTHROMBIN TIME: CPT

## 2022-05-12 PROCEDURE — 99449 NTRPROF PH1/NTRNET/EHR 31/>: CPT | Performed by: PSYCHIATRY & NEUROLOGY

## 2022-05-12 PROCEDURE — 0042T CT BRAIN PERFUSION: CPT

## 2022-05-12 PROCEDURE — 85027 COMPLETE CBC AUTOMATED: CPT

## 2022-05-12 PROCEDURE — 2580000003 HC RX 258: Performed by: STUDENT IN AN ORGANIZED HEALTH CARE EDUCATION/TRAINING PROGRAM

## 2022-05-12 PROCEDURE — 80053 COMPREHEN METABOLIC PANEL: CPT

## 2022-05-12 PROCEDURE — 70450 CT HEAD/BRAIN W/O DYE: CPT

## 2022-05-12 PROCEDURE — 93005 ELECTROCARDIOGRAM TRACING: CPT | Performed by: STUDENT IN AN ORGANIZED HEALTH CARE EDUCATION/TRAINING PROGRAM

## 2022-05-12 PROCEDURE — 99285 EMERGENCY DEPT VISIT HI MDM: CPT

## 2022-05-12 PROCEDURE — 2000000000 HC ICU R&B

## 2022-05-12 RX ORDER — SODIUM CHLORIDE 9 MG/ML
INJECTION, SOLUTION INTRAVENOUS PRN
Status: DISCONTINUED | OUTPATIENT
Start: 2022-05-12 | End: 2022-05-17 | Stop reason: HOSPADM

## 2022-05-12 RX ORDER — FINASTERIDE 5 MG/1
5 TABLET, FILM COATED ORAL DAILY
COMMUNITY

## 2022-05-12 RX ORDER — SODIUM CHLORIDE 9 MG/ML
INJECTION, SOLUTION INTRAVENOUS CONTINUOUS
Status: DISCONTINUED | OUTPATIENT
Start: 2022-05-12 | End: 2022-05-13

## 2022-05-12 RX ORDER — SODIUM CHLORIDE 0.9 % (FLUSH) 0.9 %
5-40 SYRINGE (ML) INJECTION EVERY 12 HOURS SCHEDULED
Status: DISCONTINUED | OUTPATIENT
Start: 2022-05-12 | End: 2022-05-17 | Stop reason: HOSPADM

## 2022-05-12 RX ORDER — POLYETHYLENE GLYCOL 3350 17 G/17G
17 POWDER, FOR SOLUTION ORAL DAILY PRN
Status: DISCONTINUED | OUTPATIENT
Start: 2022-05-12 | End: 2022-05-17 | Stop reason: HOSPADM

## 2022-05-12 RX ORDER — HYDRALAZINE HYDROCHLORIDE 20 MG/ML
10 INJECTION INTRAMUSCULAR; INTRAVENOUS EVERY 30 MIN PRN
Status: DISCONTINUED | OUTPATIENT
Start: 2022-05-12 | End: 2022-05-15

## 2022-05-12 RX ORDER — ACETAMINOPHEN 650 MG/1
650 SUPPOSITORY RECTAL EVERY 4 HOURS PRN
Status: DISCONTINUED | OUTPATIENT
Start: 2022-05-12 | End: 2022-05-17 | Stop reason: HOSPADM

## 2022-05-12 RX ORDER — SODIUM CHLORIDE 0.9 % (FLUSH) 0.9 %
10 SYRINGE (ML) INJECTION PRN
Status: DISCONTINUED | OUTPATIENT
Start: 2022-05-12 | End: 2022-05-17 | Stop reason: HOSPADM

## 2022-05-12 RX ORDER — 0.9 % SODIUM CHLORIDE 0.9 %
50 INTRAVENOUS SOLUTION INTRAVENOUS ONCE
Status: COMPLETED | OUTPATIENT
Start: 2022-05-12 | End: 2022-05-12

## 2022-05-12 RX ORDER — LABETALOL HYDROCHLORIDE 5 MG/ML
10 INJECTION, SOLUTION INTRAVENOUS EVERY 30 MIN PRN
Status: DISCONTINUED | OUTPATIENT
Start: 2022-05-12 | End: 2022-05-15

## 2022-05-12 RX ORDER — MIRTAZAPINE 15 MG/1
15 TABLET, FILM COATED ORAL NIGHTLY
COMMUNITY

## 2022-05-12 RX ORDER — SODIUM CHLORIDE 0.9 % (FLUSH) 0.9 %
5-40 SYRINGE (ML) INJECTION PRN
Status: DISCONTINUED | OUTPATIENT
Start: 2022-05-12 | End: 2022-05-17 | Stop reason: HOSPADM

## 2022-05-12 RX ORDER — DEXTROSE MONOHYDRATE 25 G/50ML
12.5 INJECTION, SOLUTION INTRAVENOUS
Status: ACTIVE | OUTPATIENT
Start: 2022-05-12 | End: 2022-05-12

## 2022-05-12 RX ORDER — ACETAMINOPHEN 325 MG/1
650 TABLET ORAL EVERY 4 HOURS PRN
Status: DISCONTINUED | OUTPATIENT
Start: 2022-05-12 | End: 2022-05-17 | Stop reason: HOSPADM

## 2022-05-12 RX ADMIN — IOPAMIDOL 105 ML: 755 INJECTION, SOLUTION INTRAVENOUS at 17:34

## 2022-05-12 RX ADMIN — SODIUM CHLORIDE 50 ML: 9 INJECTION, SOLUTION INTRAVENOUS at 19:04

## 2022-05-12 RX ADMIN — ALTEPLASE 5.6 MG: KIT at 18:02

## 2022-05-12 RX ADMIN — SODIUM CHLORIDE: 9 INJECTION, SOLUTION INTRAVENOUS at 22:15

## 2022-05-12 RX ADMIN — ALTEPLASE 50.4 MG: KIT at 18:03

## 2022-05-12 RX ADMIN — Medication 10 ML: at 17:34

## 2022-05-12 ASSESSMENT — PAIN - FUNCTIONAL ASSESSMENT: PAIN_FUNCTIONAL_ASSESSMENT: NONE - DENIES PAIN

## 2022-05-12 ASSESSMENT — PAIN SCALES - GENERAL
PAINLEVEL_OUTOF10: 0

## 2022-05-12 NOTE — VIRTUAL HEALTH
Consults  Patient Location:  [address] [place] [address] Emergency Department    Provider Location ([facility]/State): [facility]      This virtual visit was ED telephone- telestroke consult, for inpatient care please consult Neurology on call            I was contacted by the ED team  to review an acute code stroke  and review of cerebral vasculature imaging study to investigate if there is an large vessel occlusion. The patient has a NIHSS of 7 Aphasia and right sided weakness, Post CT his aphasia is getting better but weakness is the same per ED resident . Head CT is negative for ICH . The last known well time was reported as 4pm    The cerebral vascular imaging demonstrates No LVO. But has a small MVO- Left A3 occlusion which would be difficult to intervene on especially with NIHSS improving to less than 6 at this time of documentation    The cerebral perfusion imaging demonstrates 23cc penumbra     VIZ LVO was utilized to assist with triage      Assessment:  1. Acute Ischemic Left JOSELYN stroke    Plan:  1. Patient meets criteria for tpa    IV t-PA Consent:  WE have informed the patient and/or family of all the associated risks including 6% of sICH/death and 1-3% of angioedema, benefits, and alternatives to IV t-PA. The patient and/or family voluntarily consent to the administration of IV t-PA. 2. Too distal- Left A3 - this would be a difficult intervention in an [de-identified]year old. If his NIHSS increases then please let me know and I can talk to the family     3. Neurology and Hospitalist to follow closely. Regular post-TPA neuro checks to monitor for deterioration, as this could be a sign of cerebral edema or hemorrhage.  ** Please utilize Post TPA Acute Ischemic Stroke Admission Order Set **  Maintain BP < 180/105   No anti-platelets or anti-coagulants first 24hr post IV-rtPA minimum  SCDs for DVT prophylaxis   24 hr surveillance CT head - r/o hemorrhagic transformation  MRI brain  2D-Echo  Check fasting lipid panel - empiric high intensity statin therapy  Tight serum glucose control   Avoid hypotonic or glucose containing IVF's  Speech & Swallow eval and treat  PT/OT/IPR        Discussed with ED resident    [address] / REVIEW OF CASE AND IMAGING / DISCUSSION OF CASE WITH PHYSICIANS INVOLVED IN  THE ACUTE CARE= 34 min        This virtual visit was ED telephone- telestroke consult, for inpatient care please consult Neurology on call

## 2022-05-12 NOTE — ED PROVIDER NOTES
ATTENDING PROVIDER ATTESTATION:     Navjot Pedroza presented to the emergency department for evaluation of Cerebrovascular Accident (LKW 430pm, family found patient unable to speak, right sided weakness. unknown if on thinners. )   and was initially evaluated by the Medical Resident. See Original ED Note for H&P and ED course above. I have reviewed and discussed the case, including pertinent history (medical, surgical, family and social) and exam findings with the Medical Resident assigned to Navjotricha Pedroza. I have personally performed and/or participated in the history, exam, medical decision making, and procedures and agree with all pertinent clinical information. Critical Care:  Please note that the withdrawal or failure to initiate urgent interventions for this patient would likely result in a life threatening deterioration or permanent disability. Accordingly this patient received 33 minutes of critical care time, excluding separately billable procedures. I, Dr. Brian Gomes MD, am the primary provider of this record. I have reviewed my findings and recommendations with the assigned Medical Resident, Navjot Castros and members of family present at the time of disposition. My findings/plan: The encounter diagnosis was Cerebrovascular accident (CVA) due to occlusion of left anterior cerebral artery ([facility] [address] Current Discharge Medication List        Brian Gomes MD    Department of Emergency Medicine   ED  Provider Note  Admit Date/RoomTime: 5/12/2022  5:25 PM  ED Room: [facility]        5/12/22  5:27 PM EDT    Stroke Alert called: STROKE MARRY      HISTORY OF PRESENT ILLNESS:  (Nurses Notes Reviewed)    Chief Complaint:   Cerebrovascular Accident (LKW 430pm, family found patient unable to speak, right sided weakness. unknown if on thinners. )      Source of history provided by:  patient and spouse/SO. History/Exam Limitations: due to condition.      Kavon Watson Jessica Pollack is a [de-identified] y.o. old male presenting to the emergency department by EMS, with sudden onset of aphasia, which began today. Last known well time: 4:30pm.  The episode occurred at home. Since recognized the symptoms have been improving. Symptoms have been moderate in severity, no exacerbating or alleviating factors. He has no neurologic history. He has stroke risk factors of: none. There have been associated symptoms of right sided weakness. There has been no history of recent trauma. Patient is an 27-year-old male presents today for concern of acute stroke. Per family he had been acting appropriately, and around 200 he suddenly became aphasic and was not able to respond to family. He does have right-sided weakness as well. No history of previous stroke. No fall, trauma or injury. Code Status on file: Prior. NIH Stroke Scale at time of initial evaluation:   NIHSS Stroke Scale  Interval: Reassessment  Level of Consciousness (1a): Alert  LOC Questions (1b): Answers both correctly  LOC Commands (1c): Performs both tasks correctly  Best Gaze (2): Normal  Visual (3): No visual loss  Facial Palsy (4): Normal symmetrical movement  Motor Arm, Left (5a): No drift  Motor Arm, Right (5b): Drift, but does not hit bed  Motor Leg, Left (6a): No drift  Motor Leg, Right (6b): Drift, but does not hit bed  Limb Ataxia (7): Absent  Sensory (8): Normal  Best Language (9): No aphasia  Dysarthria (10): Normal  Extinction and Inattention (11): No abnormality  Total: 2  NIH Stroke Scale/Score at time of initial evaluation:  1A: Level of Consciousness 0 - alert; keenly responsive   1B: Ask Month and Age 0 - answers both questions correctly   1C:  Tell Patient To Open and Close Eyes, then Hand  Squeeze 0 - performs both tasks correctly   2: Test Horizontal Extraocular Movements 0 - normal   3: Test Visual Fields 0 - no visual loss   4: Test Facial Palsy 1 - minor paralysis (flattened nasolabial fold, asymmetric on smiling)   5A: Test Left Arm Motor Drift 0 - no drift, limb holds 90 (or 45) degrees for full 10 seconds   5B: Test Right Arm Motor Drift 1 - drift, limb holds 90 (or 45) degrees but drifts down before full 10 seconds: does not hit bed   6A: Test Left Leg Motor Drift 0 - no drift; leg holds 30 degree position for full 5 seconds   6B: Test Right Leg Motor Drift 2 - some effort against gravity; leg falls to bed by 5 seconds but has some effort against gravity   7: Test Limb Ataxia   (FNF/Heel-Shin) 0 - absent   8: Test Sensation 0 - normal; no sensory loss   9: Test Language/Aphasia 3 - mute, global aphasia; no usable speech or auditory comprehension   10: Test Dysarthria 0 - normal   11: Test Extinction/Inattention 0 - no abnormality   Total 7         Past Medical History:  has a past medical history of Cancer ([address] Dehydration, Hypertension, Inguinal hernia, Mediastinal adenopathy, Peripheral vascular disease ([address] and Thyroid disease. Past Surgical History:  has a past surgical history that includes Abdomen surgery; Balloon angioplasty, artery (8/6/2014); hernia repair (Right); vascular surgery; Appendectomy; Tonsillectomy; Colonoscopy (20106); and bronchoscopy (10/27/2017). Social History:  reports that he has been smoking cigarettes. He started smoking about 65 years ago. He has a 31.50 pack-year smoking history. His smokeless tobacco use includes snuff. He reports current alcohol use of about 14.0 standard drinks of alcohol per week. He reports that he does not use drugs. Prior Functional Status(Modified [place] Scale):  0=No symptoms at all    Family History: family history includes Cancer in his brother; Heart Failure in his mother; Pacemaker in his mother; Stroke in his father. The patients home medications have been reviewed. Prior to Admission medications    Medication Sig Start Date End Date Taking?  Authorizing Provider   finasteride (PROSCAR) 5 MG tablet Take 5 mg by mouth daily   Yes Historical Provider, MD   mirtazapine (REMERON) 15 MG tablet Take 15 mg by mouth nightly   Yes Historical Provider, MD   acetaminophen (TYLENOL) 325 MG tablet Take 650 mg by mouth every 6 hours as needed for Pain    Historical Provider, MD   levothyroxine (SYNTHROID) 75 MCG tablet Take 75 mcg by mouth every morning     Historical Provider, MD       Allergies: Patient has no known allergies. Review of Systems:   Pertinent positives and negatives are stated within HPI, all other systems reviewed and are negative.    ---------------------------------------------------PHYSICAL EXAM--------------------------------------    Constitutional/General: Alert and oriented, well appearing, non toxic in NAD  Head: Normocephalic and atraumatic  Eyes: PERRL, EOMI  Mouth: Oropharynx clear, handling secretions, no trismus. no facial droop  Neck: Supple, full ROM, non tender to palpation in the midline, no stridor, no crepitus, no meningeal signs  Pulmonary: Lungs clear to auscultation bilaterally, no wheezes, rales, or rhonchi. Not in respiratory distress  Cardiovascular:  Regular rate. Regular rhythm. No murmurs, gallops, or rubs. 2+ distal pulses  Chest: no chest wall tenderness  Abdomen: Soft. Non tender. Non distended. +BS. No rebound, guarding, or rigidity. No pulsatile masses appreciated. Musculoskeletal: Moves all extremities x 4. Warm and well perfused, no clubbing, cyanosis, or edema. Capillary refill <3 seconds  Skin: warm and dry. No rashes. Neurologic: Alert to person only, muscle strength 4-5 in right upper extremity, 1 out of 5 in right lower extremity, initially aphasic on arrival, however has had improvement of speech however is still slurred. Please also see NIH stroke scale.   Psych: Normal Affect      -------------------------------------------------- RESULTS -------------------------------------------------  All laboratory and imaging studies have been reviewed by haider    LABS:  Results for orders placed or performed during the hospital encounter of 05/12/22   COVID-19, Rapid    Specimen: Nasopharyngeal Swab   Result Value Ref Range    SARS-CoV-2, NAAT Not Detected Not Detected   APTT   Result Value Ref Range    aPTT 25.8 24.5 - 35.1 sec   CBC   Result Value Ref Range    WBC 6.9 4.5 - 11.5 E9/L    RBC 4.46 3.80 - 5.80 E12/L    Hemoglobin 12.5 12.5 - 16.5 g/dL    Hematocrit 39.1 37.0 - 54.0 %    MCV 87.7 80.0 - 99.9 fL    MCH 28.0 26.0 - 35.0 pg    MCHC 32.0 32.0 - 34.5 %    RDW 13.7 11.5 - 15.0 fL    Platelets 020 707 - 701 E9/L    MPV 8.9 7.0 - 12.0 fL   Comprehensive Metabolic Panel   Result Value Ref Range    Sodium 138 132 - 146 mmol/L    Potassium 4.1 3.5 - 5.0 mmol/L    Chloride 104 98 - 107 mmol/L    CO2 20 (L) 22 - 29 mmol/L    Anion Gap 14 7 - 16 mmol/L    Glucose 112 (H) 74 - 99 mg/dL    BUN 18 6 - 23 mg/dL    CREATININE 1.4 (H) 0.7 - 1.2 mg/dL    GFR Non-African American 49 >=60 mL/min/1.73    GFR African American 59     Calcium 8.4 (L) 8.6 - 10.2 mg/dL    Total Protein 5.7 (L) 6.4 - 8.3 g/dL    Albumin 3.5 3.5 - 5.2 g/dL    Total Bilirubin 0.3 0.0 - 1.2 mg/dL    Alkaline Phosphatase 86 40 - 129 U/L    ALT 11 0 - 40 U/L    AST 22 0 - 39 U/L   Protime-INR   Result Value Ref Range    Protime 11.4 9.3 - 12.4 sec    INR 1.1    SPECIMEN REJECTION   Result Value Ref Range    Rejected Test TRP5     Reason for Rejection see below    POCT Glucose   Result Value Ref Range    Glucose 112 mg/dL   POCT Glucose   Result Value Ref Range    Meter Glucose 112 (H) 74 - 99 mg/dL   EKG 12 Lead   Result Value Ref Range    Ventricular Rate 93 BPM    Atrial Rate 93 BPM    P-R Interval 170 ms    QRS Duration 90 ms    Q-T Interval 342 ms    QTc Calculation (Bazett) 425 ms    P Axis 71 degrees    R Axis 45 degrees    T Axis 54 degrees       RADIOLOGY:  Interpreted by Radiologist.  CT BRAIN PERFUSION   Final Result      Small region of ischemia in the distribution of the left anterior   cerebral artery      This study was analyzed by the [address] ai algorithm. CT HEAD WO CONTRAST   Final Result   Diffuse atrophy likely age related   Findings compatible with small vessel ischemic changes. The findings were called at the time of dictation to Dr. Yenifer Werner   Final Result   1. Estimated stenosis of the proximal right and left internal carotid   artery by NASCET criteria is not hemodynamically significant   2. Moderate atherosclerotic disease . 3. Occluded left A3 segment            This study was analyzed by the [employer]. ai algorithm. CTA NECK W CONTRAST   Final Result   1. Estimated stenosis of the proximal right and left internal carotid   artery by NASCET criteria is not hemodynamically significant   2. Moderate atherosclerotic disease . 3. Occluded left A3 segment            This study was analyzed by the [employer]. ai algorithm.                     ------------------------- NURSING NOTES AND VITALS REVIEWED ---------------------------   The nursing notes within the ED encounter and vital signs as below have been reviewed. BP (!) 165/79   Pulse 71   Temp 98.3 °F (36.8 °C) (Oral)   Resp 19   Ht 5' 7\" (1.702 m)   Wt 137 lb 3.2 oz (62.2 kg)   SpO2 100%   BMI 21.49 kg/m²   Oxygen Saturation Interpretation: Normal    The patients available past medical records and past encounters were reviewed.         ------------------------------ ED COURSE/MEDICAL DECISION MAKING----------------------  Medications   sodium chloride flush 0.9 % injection 10 mL (10 mLs IntraVENous Given 5/12/22 0235)   sodium chloride flush 0.9 % injection 5-40 mL (5 mLs IntraVENous Not Given 5/12/22 2210)   sodium chloride flush 0.9 % injection 5-40 mL (has no administration in time range)   0.9 % sodium chloride infusion (has no administration in time range)   dextrose 50 % IV solution (has no administration in time range)   polyethylene glycol (GLYCOLAX) packet 17 g (has no administration in time range)   acetaminophen (TYLENOL) tablet 650 mg (has no administration in time range)     Or   acetaminophen (TYLENOL) suppository 650 mg (has no administration in time range)   hydrALAZINE (APRESOLINE) injection 10 mg (has no administration in time range)   labetalol (NORMODYNE;TRANDATE) injection 10 mg (has no administration in time range)   0.9 % sodium chloride infusion ( IntraVENous Rate/Dose Verify 5/12/22 2300)   iopamidol (ISOVUE-370) 76 % injection 105 mL (105 mLs IntraVENous Given 5/12/22 1734)   alteplase (ACTIVASE) injection 5.6 mg (5.6 mg IntraVENous New Bag 5/12/22 1802)     Followed by   alteplase (ACTIVASE) injection 50.4 mg (0 mg IntraVENous Stopped 5/12/22 1903)     Followed by   0.9 % sodium chloride bolus (50 mLs IntraVENous New Bag 5/12/22 1904)       Based upon patient's stroke like symptoms a stroke neurology consult is indicated. Consult to Neurology completed. [facility] Neurology      Acute CVA Core Measures:   Last Known to be Well (Stroke Diagnosis)  Date Last Known Well: 05/12/22  Time Last Known Well: 453 2311  NIH Stroke Scale Total: Total: 2  t-PA Eligibility: was recommeded by [facility] Neurologist and under the order of the ED physician    Medical Decision Making:   Patient is a 81-year-old male presents today for concern for acute CVA. Last known well 4:30 PM.  On arrival NIH of 7, he has right-sided weakness, he is aphasic. While in the hospital he did have improvement of speech, however right-sided weakness has continued to persist.  Vitals are stable. CT imaging does show a left JOSELYN occlusion. Neuro was consulted who did recommend tPA. I did speak with both patient and wife who is at bedside discussed benefits and risks of tPA. They are in agreement for tPA. This was initiated in the ER. He has remained hemodynamically stable. He will be admitted to the hospital for CVA status post tPA    Re-Evaluations:             Re-evaluation.   Patients symptoms are improving    This patient's ED course included: a personal history and physicial examination, re-evaluation prior to disposition, multiple bedside re-evaluations, IV medications and a personal history and physicial eaxmination    This patient has remained hemodynamically stable during their ED course. Consultations: The case has been discussed with Dr. Diandra Stanton, they agree this patient is eligible for t-PA. Dr. Arsen Castro they will admit the patient      CRITICAL CARE:   35 MINUTES. Please note that the withdrawal or failure to initiate urgent interventions for this patient would likely result in a life threatening deterioration or permanent disability. Accordingly this patient received the above mentioned time, excluding separately billable procedures. Counseling: The emergency provider has spoken with the patient and discussed todays presentation, in addition to providing specific details for the plan of care and counseling regarding the diagnosis and prognosis. Questions are answered at this time and they are agreeable with the plan.     --------------------------------- IMPRESSION AND DISPOSITION ---------------------------------    IMPRESSION  1. Cerebrovascular accident (CVA) due to occlusion of left anterior cerebral artery (HCC)        DISPOSITION  Disposition: Admit to CCU/ICU  Patient condition is stable      ED Course as of 05/13/22 0012   Thu May 12, 2022   1751 Left JOSELYN [JH]   1804 EKG: This EKG is signed and interpreted by me. Rate: 96  Rhythm: Sinus  Interpretation: Normal sinus rhythm, normal PA interval, normal QRS, normal QT interval, no acute ST or T wave changes  Comparison: no previous EKG available     [JA]   1810 Patient is a 28-year-old male presenting as possible stroke from home. Patient's family member is at bedside providing history. Last known well was 4:30 PM today. No prior history of stroke. There has been no history of fall.   Patient takes no anticoagulation. Patient is denying any pain. He is hemodynamically stable on my evaluation. NIH Stroke Scale/Score at time of initial evaluation:  1A: Level of Consciousness 0 - alert; keenly responsive  1B: Ask Month and Age 1 - answers one question correctly  1C: Tell Patient To Open and Close Eyes, then Hand  Squeeze 0 - performs both tasks correctly  2: Test Horizontal Extraocular Movements 0 - normal  3: Test Visual Fields 0 - no visual loss  4: Test Facial Palsy 1 - minor paralysis (flattened nasolabial fold, asymmetric on smiling)  5A: Test Left Arm Motor Drift 0 - no drift, limb holds 90 (or 45) degrees for full 10 seconds  5B: Test Right Arm Motor Drift 1 - drift, limb holds 90 (or 45) degrees but drifts down before full 10 seconds: does not hit bed  6A: Test Left Leg Motor Drift 0 - no drift; leg holds 30 degree position for full 5 seconds  6B: Test Right Leg Motor Drift 3 - no effort against gravity; leg falls to bed immediately  7: Test Limb Ataxia   (FNF/Heel-Shin) 2 - present in two limbs  8: Test Sensation 0 - normal; no sensory loss  9: Test Language/Aphasia 1 - mild to moderate aphasia; some obvious loss of fluency or facility of comprehension without significant limitation on ideas expressed or form of expression. Reduction of speech and/or comprehension, however, makes conversation about provided materials difficult or impossible. For example, in conversation about provided materials, examiner can identify picture or naming card content from patient's response.    10: Test Dysarthria 0 - normal  11: Test Extinction/Inattention 0 - no abnormality  Total 9 [JA]   1811 No Intracranial hemorrhage on CT  No Neurosurgery, head trauma, or stroke in last 3 months  No SBP>185 or SBP>110  No History of ICH  No Known AV malformation, neoplasm, or aneurysm  No Active internal bleeding  No Suspected/confirmed endocarditis  No Known bleeding diathesis (platelets <724,100, INR >1.7)  No Current use of Xa inhibitors  No TPA contraindications    Discussed risks versus benefits of tPA, family agreeable   [JA]   1944 Patient reevaluated, resting comfortably in bed. He has had vast improvement of symptoms. Speech is intact.   He does have increased muscle strength to the right side [JH]      ED Course User Index  [JA] Brian Gomes MD  [] Salvador Fine DO     '      Salvador Fine,   Resident  05/12/22 [address] [place] [address],   Resident  05/12/22 1944       Brian Gomes MD  05/13/22 0004

## 2022-05-12 NOTE — ED NOTES
ER Clinical Pharmacy Note:    [de-identified] presents to the ER @ 17:17 for stroke symptoms. Arrival time = 1717  POCT glucose = 112  NIH = 7  Weight = 62.2 kg  Total dose tPA = 0.9mg/kg x 62.2 kg = 56 mg  Bolus dose = 5.6 mg over 1 minute, started at 1812  Infusion dose = 50.4 mg over 60 minutes  Door to needle time = 55 minutes    Patient and/or family was counseled on the risks/benefits of giving tPA. They agree and wish to proceed.     Jn Allred, PharmD, [address] 5/12/2022 6:31 PM  Pharmacy Clinical Specialist, Emergency Medicine  [phone]

## 2022-05-13 ENCOUNTER — APPOINTMENT (OUTPATIENT)
Dept: CT IMAGING | Age: 82
DRG: 062 | End: 2022-05-13
Payer: MEDICARE

## 2022-05-13 ENCOUNTER — APPOINTMENT (OUTPATIENT)
Dept: MRI IMAGING | Age: 82
DRG: 062 | End: 2022-05-13
Payer: MEDICARE

## 2022-05-13 LAB
ALBUMIN SERPL-MCNC: 3.4 G/DL (ref 3.5–5.2)
ALP BLD-CCNC: 91 U/L (ref 40–129)
ALT SERPL-CCNC: 9 U/L (ref 0–40)
ANION GAP SERPL CALCULATED.3IONS-SCNC: 7 MMOL/L (ref 7–16)
ANISOCYTOSIS: ABNORMAL
AST SERPL-CCNC: 15 U/L (ref 0–39)
BASOPHILS ABSOLUTE: 0.05 E9/L (ref 0–0.2)
BASOPHILS RELATIVE PERCENT: 0.9 % (ref 0–2)
BILIRUB SERPL-MCNC: 0.5 MG/DL (ref 0–1.2)
BUN BLDV-MCNC: 16 MG/DL (ref 6–23)
CALCIUM SERPL-MCNC: 8.7 MG/DL (ref 8.6–10.2)
CHLORIDE BLD-SCNC: 101 MMOL/L (ref 98–107)
CHOLESTEROL, TOTAL: 154 MG/DL (ref 0–199)
CO2: 28 MMOL/L (ref 22–29)
CREAT SERPL-MCNC: 1.5 MG/DL (ref 0.7–1.2)
EKG ATRIAL RATE: 93 BPM
EKG P AXIS: 71 DEGREES
EKG P-R INTERVAL: 170 MS
EKG Q-T INTERVAL: 342 MS
EKG QRS DURATION: 90 MS
EKG QTC CALCULATION (BAZETT): 425 MS
EKG R AXIS: 45 DEGREES
EKG T AXIS: 54 DEGREES
EKG VENTRICULAR RATE: 93 BPM
EOSINOPHILS ABSOLUTE: 0.1 E9/L (ref 0.05–0.5)
EOSINOPHILS RELATIVE PERCENT: 1.7 % (ref 0–6)
GFR AFRICAN AMERICAN: 54
GFR NON-AFRICAN AMERICAN: 45 ML/MIN/1.73
GLUCOSE BLD-MCNC: 135 MG/DL (ref 74–99)
HCT VFR BLD CALC: 39 % (ref 37–54)
HDLC SERPL-MCNC: 40 MG/DL
HEMOGLOBIN: 12.5 G/DL (ref 12.5–16.5)
LDL CHOLESTEROL CALCULATED: 90 MG/DL (ref 0–99)
LYMPHOCYTES ABSOLUTE: 0.28 E9/L (ref 1.5–4)
LYMPHOCYTES RELATIVE PERCENT: 5.2 % (ref 20–42)
MCH RBC QN AUTO: 27.6 PG (ref 26–35)
MCHC RBC AUTO-ENTMCNC: 32.1 % (ref 32–34.5)
MCV RBC AUTO: 86.1 FL (ref 80–99.9)
MONOCYTES ABSOLUTE: 0.56 E9/L (ref 0.1–0.95)
MONOCYTES RELATIVE PERCENT: 9.6 % (ref 2–12)
NEUTROPHILS ABSOLUTE: 4.65 E9/L (ref 1.8–7.3)
NEUTROPHILS RELATIVE PERCENT: 82.6 % (ref 43–80)
OVALOCYTES: ABNORMAL
PDW BLD-RTO: 13.7 FL (ref 11.5–15)
PHOSPHORUS: 2.8 MG/DL (ref 2.5–4.5)
PLATELET # BLD: 232 E9/L (ref 130–450)
PMV BLD AUTO: 8.8 FL (ref 7–12)
POIKILOCYTES: ABNORMAL
POTASSIUM SERPL-SCNC: 3.7 MMOL/L (ref 3.5–5)
RBC # BLD: 4.53 E12/L (ref 3.8–5.8)
SODIUM BLD-SCNC: 136 MMOL/L (ref 132–146)
TOTAL PROTEIN: 5.4 G/DL (ref 6.4–8.3)
TRIGL SERPL-MCNC: 119 MG/DL (ref 0–149)
VLDLC SERPL CALC-MCNC: 24 MG/DL
WBC # BLD: 5.6 E9/L (ref 4.5–11.5)

## 2022-05-13 PROCEDURE — A9579 GAD-BASE MR CONTRAST NOS,1ML: HCPCS | Performed by: RADIOLOGY

## 2022-05-13 PROCEDURE — 70553 MRI BRAIN STEM W/O & W/DYE: CPT

## 2022-05-13 PROCEDURE — 80061 LIPID PANEL: CPT

## 2022-05-13 PROCEDURE — 2580000003 HC RX 258: Performed by: EMERGENCY MEDICINE

## 2022-05-13 PROCEDURE — 99291 CRITICAL CARE FIRST HOUR: CPT | Performed by: SURGERY

## 2022-05-13 PROCEDURE — 99223 1ST HOSP IP/OBS HIGH 75: CPT | Performed by: CLINICAL NURSE SPECIALIST

## 2022-05-13 PROCEDURE — 36415 COLL VENOUS BLD VENIPUNCTURE: CPT

## 2022-05-13 PROCEDURE — 2000000000 HC ICU R&B

## 2022-05-13 PROCEDURE — 6360000004 HC RX CONTRAST MEDICATION: Performed by: RADIOLOGY

## 2022-05-13 PROCEDURE — 84100 ASSAY OF PHOSPHORUS: CPT

## 2022-05-13 PROCEDURE — 6370000000 HC RX 637 (ALT 250 FOR IP): Performed by: CLINICAL NURSE SPECIALIST

## 2022-05-13 PROCEDURE — 93010 ELECTROCARDIOGRAM REPORT: CPT | Performed by: INTERNAL MEDICINE

## 2022-05-13 PROCEDURE — 70450 CT HEAD/BRAIN W/O DYE: CPT

## 2022-05-13 PROCEDURE — 80053 COMPREHEN METABOLIC PANEL: CPT

## 2022-05-13 PROCEDURE — 85025 COMPLETE CBC W/AUTO DIFF WBC: CPT

## 2022-05-13 RX ORDER — ATORVASTATIN CALCIUM 40 MG/1
40 TABLET, FILM COATED ORAL NIGHTLY
Status: DISCONTINUED | OUTPATIENT
Start: 2022-05-13 | End: 2022-05-17 | Stop reason: HOSPADM

## 2022-05-13 RX ADMIN — GADOTERIDOL 12 ML: 279.3 INJECTION, SOLUTION INTRAVENOUS at 21:50

## 2022-05-13 RX ADMIN — SODIUM CHLORIDE, PRESERVATIVE FREE 10 ML: 5 INJECTION INTRAVENOUS at 22:09

## 2022-05-13 RX ADMIN — ATORVASTATIN CALCIUM 40 MG: 40 TABLET, FILM COATED ORAL at 22:05

## 2022-05-13 ASSESSMENT — PAIN SCALES - GENERAL
PAINLEVEL_OUTOF10: 0

## 2022-05-13 NOTE — PLAN OF CARE
Problem: Chronic Conditions and Co-morbidities  Goal: Patient's chronic conditions and co-morbidity symptoms are monitored and maintained or improved  Outcome: Progressing  Flowsheets (Taken 5/12/2022 2123)  Care Plan - Patient's Chronic Conditions and Co-Morbidity Symptoms are Monitored and Maintained or Improved: Monitor and assess patient's chronic conditions and comorbid symptoms for stability, deterioration, or improvement     Problem: Pain  Goal: Verbalizes/displays adequate comfort level or baseline comfort level  Outcome: Progressing     Problem: Skin/Tissue Integrity  Goal: Absence of new skin breakdown  Description: 1. Monitor for areas of redness and/or skin breakdown  2. Assess vascular access sites hourly  3. Every 4-6 hours minimum:  Change oxygen saturation probe site  4. Every 4-6 hours:  If on nasal continuous positive airway pressure, respiratory therapy assess nares and determine need for appliance change or resting period.   Outcome: Progressing     Problem: Discharge Planning  Goal: Discharge to home or other facility with appropriate resources  Recent Flowsheet Documentation  Taken 5/12/2022 2123 by Verenice Sexton RN  Discharge to home or other facility with appropriate resources: Identify barriers to discharge with patient and caregiver

## 2022-05-13 NOTE — PROGRESS NOTES
Much better this  Afebrile and hemodynamically stable  No apparent residual neurologic deficit  RRR  Lungs clear with diminished BS  Abdomen soft without tenderness  No peripheral edema  Discussed everything with patient and family at bedside

## 2022-05-13 NOTE — PLAN OF CARE
Problem: Chronic Conditions and Co-morbidities  Goal: Patient's chronic conditions and co-morbidity symptoms are monitored and maintained or improved  5/13/2022 0808 by Boom Rudd RN  Outcome: Progressing  5/12/2022 2225 by Dino Donis RN  Outcome: Progressing  Flowsheets (Taken 5/12/2022 2123)  Care Plan - Patient's Chronic Conditions and Co-Morbidity Symptoms are Monitored and Maintained or Improved: Monitor and assess patient's chronic conditions and comorbid symptoms for stability, deterioration, or improvement     Problem: Discharge Planning  Goal: Discharge to home or other facility with appropriate resources  Outcome: Progressing  Flowsheets (Taken 5/12/2022 2123 by Dino Donis RN)  Discharge to home or other facility with appropriate resources: Identify barriers to discharge with patient and caregiver     Problem: Pain  Goal: Verbalizes/displays adequate comfort level or baseline comfort level  5/13/2022 0808 by Boom Rudd RN  Outcome: Progressing  5/12/2022 2225 by Dino Donis RN  Outcome: Progressing     Problem: Skin/Tissue Integrity  Goal: Absence of new skin breakdown  Description: 1. Monitor for areas of redness and/or skin breakdown  2. Assess vascular access sites hourly  3. Every 4-6 hours minimum:  Change oxygen saturation probe site  4. Every 4-6 hours:  If on nasal continuous positive airway pressure, respiratory therapy assess nares and determine need for appliance change or resting period.   5/13/2022 0808 by Boom Rudd RN  Outcome: Progressing  5/12/2022 2225 by Dino Donis RN  Outcome: Progressing     Problem: Safety - Adult  Goal: Free from fall injury  Outcome: Progressing     Problem: ABCDS Injury Assessment  Goal: Absence of physical injury  Outcome: Progressing

## 2022-05-13 NOTE — PROGRESS NOTES
[facility] SURGICAL [facility]  SURGICAL INTENSIVE CARE UNIT (SICU)  ATTENDING PHYSICIAN CRITICAL CARE PROGRESS NOTE     I have examined the patient, reviewed the record, and discussed the case with the APN/ resident. Please refer to the APN/ resident's note. I agree with the assessment and plan. I have reviewed all relevant labs and imaging data. The following summarizes my clinical findings and independent assessment. CC:  Critical care management after stroke; tPA    Hospital Course/Overnight Events:  5/12--presented with right sided weakness and aphasia--given t-PA  5/13--no issues overnight    Pt without complaints. Denies headache.     Awake and alert  Follows commands  Hrt:  Regular rate/rhythm; no murmur  Lungs:  Fairly clear bilaterally  Abd:  Soft; BS active; NT/ND  Skin:  Warm/dry  Ext:  Strength 5/5 bilateral upper/lower ext    Labs personally reviewed  Films personally reviewed/interpreted--no ICH on head CT    Patient Active Problem List    Diagnosis Date Noted    Cerebrovascular accident (CVA) due to occlusion of left anterior cerebral artery (HCC)     Acute CVA (cerebrovascular accident) ([facility] [address] 05/12/2022    CAP (community acquired pneumonia) due to Chlamydia species 11/08/2019    Pneumonia 11/08/2019    CKD (chronic kidney disease) stage 3, GFR 30-59 ml/min ([facility]) 14/18/4909    Metabolic encephalopathy 33/28/9311    Acute bronchitis due to Rhinovirus 06/18/2019    [facility]peripheral vascular [facility]) [facility] 03/06/2019    Acquired hypothyroidism 03/05/2019     Acute stroke--s/p t-PA--monitor neuro exam  No antiplatelet or anticoagulants until 24 hours post t-PA  Statin  Echo  Chronic renal insuff--monitor BUN/Cr/UO  Hypoalbuminemia--diet as tolerated  PT/OT evals  Speech eval  DVT risk--PCDs    Pt is at risk for neurologic/metabolic deterioration and requires ICU care    Kirti Caraballo MD, FACS  5/13/2022  3:54 PM      Critical care time exclusive of teaching and procedures = 37 minutes

## 2022-05-13 NOTE — PROGRESS NOTES
[facility] Intensive Care Unit  Critical Care  Daily Progress Note 5/13/2022    Date of Admission:5/12/22    CC:  Stroke s/p tPA      HOSPITAL EVENTS  5/12 presented to ED with cc aphasia, right sided weakness. NIHSS 7. TPA administered at [address] [facility] PM. Not candidate for thrombectomy. Admitted to NSICU.   5/13 Neuro deficits resolving. No significant events overnight    OVERNIGHT EVENTS: T max  98.5 F. Did not require additional doses of antihypertensive agents or units of insulin in the previous 24 hours. PHYSICAL EXAM:    BP (!) 178/72   Pulse 74   Temp 98.5 °F (36.9 °C) (Oral)   Resp 19   Ht 5' 7\" (1.702 m)   Wt 137 lb 3.2 oz (62.2 kg)   SpO2 96%   BMI 21.49 kg/m²     Intake/Output Summary (Last 24 hours) at 5/13/2022 1535  Last data filed at 5/13/2022 1500  Gross per 24 hour   Intake 896.19 ml   Output 1400 ml   Net -503.81 ml         General appearance:  Comfortable. NEUROLOGIC:        GCS:    4 - Opens eyes on own   6 - Follows simple motor commands  5 - Alert and oriented       Pupil size:  Left 3 mm  Right 3 mm  Pupil reaction: Yes   PERRLA  Wiggles fingers: Left Yes Right Yes  Hand grasp:   Left present     Right present  Wiggles toes: Left Yes    Right Yes  Plantar flexion: Left present    Right present  Facial droop:   None   Speech:  Clear    CONSTITUTIONAL: No acute distress  CARDIOVASCULAR: S1 S2, regular rate, regular rhythm,Monitor: SR  PULMONARY:  Respirations unlabored. No rhonchi/rales/wheezes. RENAL: External catheter, clear yellow urine. ABDOMEN: Soft, nontender, nondistended, nontympanic, normal bowel sounds. MUSCULOSKELETAL: MORRISSEY  SKIN/EXTREMITIES: No rashes/ecchymosis, no edema/clubbing, warm/dry, good capillary refill.      IV ACCESS: PIV      ASSESSMENT/PLAN:     Principal Problem:    Acute CVA (cerebrovascular accident) [facility])  Active Problems:    Cerebrovascular accident (CVA) due to occlusion of left anterior cerebral artery ([address]  Resolved Problems:    * No resolved hospital problems. *      Neuro:  Stroke s/p tPA. Intermittent episodes of expressive aphasia. Neurology following. Monitor neuro status. Stroke protocol. Stroke education. Speech therapy. CT. MRI  CV No acute issues. Permissive hypertension. SBP goal <185 mm Hg. Pulm:No acute issues. Monitor respiratory status  GI: Passed bedside swallow. Diet Monitor bowel status  Renal: Elevated SCr (appears at baseline)  Monitor uop, renal function and electrolytes. ID: No leukocytosis. Afebrile. Endocrine: Hyperglycemia. Follow labs     MSK:. Deconditioned. PT/OT   Heme: No acute issues. Monitor CBC. Bowel regime: glycolax  Pain control/Sedation:  Acetaminophen  DVT prophylaxis: SCDs. GI prophylaxis: Diet  Glucose protocol:  Labs  Mouth/Eye care: As needed. Consults: Neurology. Medicine  Patient/Family update: Son and patient updated on patient status and  plan at bedside. Questions answered   Code status: Full        Disposition:  NSICU.         ELIOT Benavides  5/13/2022  7:46 AM

## 2022-05-13 NOTE — CARE COORDINATION
Pt admitted with sudden onset of R arm weakness and aphasia. Given tPA. Met with pt in room. He is alert and oriented. He states R arm weakness has improved. MRI brain and echo pending. Pt reports that he lives with his wife in a 1 story home with 3 bg with rail. He does not use any assistive devices to ambulate. He states he does not drive, but his wife does. PCP is Dr James Oneal and preferred pharmacy is [facility] in [facility]. Dc plan at this time  Is to return home. Would benefit from Pt/Ot evals.

## 2022-05-13 NOTE — PROGRESS NOTES
Patient admitted to CVICU 3820- vitals stable at this time. Personal belongings at bedside.  Dr. Chi Jeronimo notified of patients arrival.

## 2022-05-13 NOTE — CONSULTS
SURGICAL INTENSIVE CARE  PROGRESS NOTE    Date of admission:  5/12/2022  Reason for ICU transfer:  Acute ischemic left JOSELYN stroke s/p tPA    SUBJECTIVE:  Patient is an [de-identified] yo M who presented to the ED from home for sudden onset aphasia and right sided weakness. In ED he had a CT head that was negative for hemorrhage. CTA head showed occluded left A3 segment. Neurointerventional radiology was consulted, not a candidate for a thrombectomy but was a candidate for tPA, therefore tPA was administered. He was transferred to the ICU for close monitoring after tPA administration. His aphasia has resolved, still has right sided weakness. Denies numbness, tingling, changes in vision. Denies taking any blood thinning medications at home. PHYSICAL EXAM:  BP (!) 157/89   Pulse 88   Temp 98.6 °F (37 °C)   Resp 20   Ht 5' 7\" (1.702 m)   Wt 137 lb 3.2 oz (62.2 kg)   SpO2 98%   BMI 21.49 kg/m²     GENERAL:  NAD. A&Ox3. HEAD:  Normocephalic, atraumatic. EYES:   No scleral icterus. PERRLA. LUNGS:  No increased work of breathing. CTAB. CARDIOVASCULAR: RR  ABDOMEN:  Soft, non-distended, non-tender. No guarding, rigidity, rebound. EXTREMITIES:   MAEx4. Atraumatic. No LE edema. RUE and RLE 4/5 strength  SKIN:  Warm and dry  NEUROLOGIC:  GCS 15    ASSESSMENT / PLAN:  Neuro:  Left JOSELYN ischemic stroke s/p tPA. Neurointerventional radiology consulted, not candidate for thrombectomy. Neuro checks q1 hr, repeat CT head 24 hours, MRI brain, 2D ECHO, maintain BP <180/105. GCS 15. Pain control. CV: No acute issues. SBP goal <180/105. Will order prn BP medications. Monitoring of hemodynamics. Pulm: No acute issues. Encourage IS/SMI. GI: No acute issues. Bowel regimen. Zofran PRN. Renal: No acute issues. CKD, Cr at baseline. Monitor UOP & Electrolytes. Endocrine: No acute issues. Hx hypothyroidism, restart synthroid when able. Monitor glucose. MSK: No acute issues. PT/OT. Heme: No acute issues.  Hx prostate cancer, restart proscar and terazosin when able  ID: No acute issues. Pain/Analgesia: Tylenol prn  Total fluid rate: NS @ 75  Bowel regimen: glycolax prn  DVT proph:  PCDs  GI proph:  N/A   Glucose protocol: N/A    Mouth/eye care: As needed per patient  [place]:   5/12/22. Keep in place for fluid monitoring.   CVC sites:  N/A    Ancillary consults: Neurointerventional radiology, IM  Family Update: As available    Disposition: Continue ICU    Tsering Hemphill MD  Resident, PGY-3  5/12/2022  9:33 PM

## 2022-05-13 NOTE — PROGRESS NOTES
Myrna Navarrete is a [de-identified] y.o. right handed man    Past Medical History:     Past Medical History:   Diagnosis Date    Cancer [facility]) 2014    prostate    Dehydration 11/10/2019    Hypertension     Inguinal hernia     Mediastinal adenopathy 10/2017    Peripheral vascular disease ([address]     Thyroid disease        Past Surgical History:     Past Surgical History:   Procedure Laterality Date    ABDOMEN SURGERY      APPENDECTOMY      BALLOON ANGIOPLASTY, ARTERY  8/6/2014    [employer]ahn 8x10 left common iliac Dr Gann Stamp  10/27/2017    COLONOSCOPY  20106    HERNIA REPAIR Right     R inguinal    TONSILLECTOMY      VASCULAR SURGERY      stent left leg       Allergies:     Patient has no known allergies. Medications:     Prior to Admission medications    Medication Sig Start Date End Date Taking? Authorizing Provider   finasteride (PROSCAR) 5 MG tablet Take 5 mg by mouth daily   Yes Historical Provider, MD   mirtazapine (REMERON) 15 MG tablet Take 15 mg by mouth nightly   Yes Historical Provider, MD   acetaminophen (TYLENOL) 325 MG tablet Take 650 mg by mouth every 6 hours as needed for Pain    Historical Provider, MD   levothyroxine (SYNTHROID) 75 MCG tablet Take 75 mcg by mouth every morning     Historical Provider, MD       Social History:     Social History     Tobacco Use    Smoking status: Current Every Day Smoker     Packs/day: 0.50     Years: 63.00     Pack years: 31.50     Types: Cigarettes     Start date: 6/1/1956    Smokeless tobacco: Current User     Types: Snuff     Last attempt to quit: 11/21/2014   Vaping Use    Vaping Use: Never used   Substance Use Topics    Alcohol use:  Yes     Alcohol/week: 14.0 standard drinks     Types: 14 Cans of beer per week     Comment: 2 beer daily    Drug use: Never       Review of Systems:     No chest pain or palpitations  No SOB  No vertigo, lightheadedness or loss of consciousness  No falls, tripping or stumbling  No incontinence of bowels or bladder  No itching or bruising appreciated    ROS otherwise negative     Family History:     Family History   Problem Relation Age of Onset    Cancer Brother     Heart Failure Mother     Pacemaker Mother     Stroke Father         History of Present Illness:     Patient was admitted with sudden onset of aphasia and right arm weakness. LKW was shortly before arrival.    stroke alert called and he was a candidate for IV tPA -- NIHSS was 7    Markedly improved -- NIHSS 0 for nursing but they do report some confusion with meal ordering. No further focal weakness.     2 recent ED evaluations (in last 2 weeks) -- fatigue and lightheadedness   Recent biopsy of lung mass (at [address] [facility]) -- states it was cancer but has not been contacted to start chemo or radiation     No family present -- he appears to be a good historian but fatigue noted     Objective:   BP (!) 142/65   Pulse 76   Temp 98.7 °F (37.1 °C) (Temporal)   Resp 20   Ht 5' 7\" (1.702 m)   Wt 137 lb 3.2 oz (62.2 kg)   SpO2 97%   BMI 21.49 kg/m²      General appearance: alert, appears stated age and cooperative  Head: Normocephalic, without obvious abnormality, atraumatic  Extremities: no cyanosis or edema  Pulses: 2+ and symmetric  Skin: no rashes or lesions     Mental Status: Alert, oriented to person place and year     Speech: clear  Language: appropriate     Cranial Nerves:  I: smell    II: visual acuity     II: visual fields Full    II: pupils DUNCAN   III,VII: ptosis None   III,IV,VI: extraocular muscles  EOMI without nystagmus    V: mastication Normal   V: facial light touch sensation  Normal   V,VII: corneal reflex  Present   VII: facial muscle function - upper     VII: facial muscle function - lower Normal   VIII: hearing Normal   IX: soft palate elevation  Normal   IX,X: gag reflex Present   XI: trapezius strength  5/5   XI: sternocleidomastoid strength 5/5   XI: neck extension strength  5/5   XII: tongue strength  Normal     Motor:  5/5 05/13/2022    PROT 5.4 05/13/2022    LABALBU 3.4 05/13/2022    CALCIUM 8.7 05/13/2022    BILITOT 0.5 05/13/2022    ALKPHOS 91 05/13/2022    AST 15 05/13/2022    ALT 9 05/13/2022     FLP:    Lab Results   Component Value Date    TRIG 119 05/13/2022    HDL 40 05/13/2022    LDLCALC 90 05/13/2022    LABVLDL 24 05/13/2022       CTA:  1. Estimated stenosis of the proximal right and left internal carotid  artery by NASCET criteria is not hemodynamically significant  2. Moderate atherosclerotic disease . 3. Occluded left A3 segment    CTP:  Small region of ischemia in the distribution of the left anterior  cerebral artery            I independently reviewed the labs and imaging studies today. Assessment:     Left JOSELYN stroke s/p IV tPA with marked resolution in his s/s   Now with NIHSS 0 but cognitively seems delayed   Will continue to monitor     Cardiac workup will ensue     ? recent lung cancer -- Biopsy February but no follow-up since    ?hypercoagulable from underlying cancer     Plan:     MRI Brain to be obtained (with and without) given ?  cancer dx    Echo with bubble and possible cardiac event monitoring on discharge    DAPT for 21 days followed by ASA monotherapy afterwards if CT Head unrevealing this afternoon    Statin    SBP goal of  <180/105 x 24 hours then <140/90    Anticipate stroke clinic follow-up     OSBALDO Tena - CNS  1:37 PM  5/13/2022

## 2022-05-13 NOTE — H&P
[address]                  [address] [place],  [facility]                              HISTORY AND PHYSICAL    PATIENT NAME: Samson Mazariegos                     :        1940  MED REC NO:   [id number]                            ROOM:       06  ACCOUNT NO:   [de-identified]                           ADMIT DATE: 2022  PROVIDER:     Brian Rudd DO    CHIEF COMPLAINT:  Acute stroke. HISTORY OF PRESENT ILLNESS:  The patient is an 81-year-old male with a  complicated past medical history including recent left upper lobectomy,  laparoscopic surgery in [facility] [place] for lung cancer; peripheral artery  disease; chronic artery stenosis; chronic obstructive pulmonary disease;  hypothyroidism; prostate cancer and depression, who describes onset of  symptoms on the radha of admission. He presented to ER urgently with new  onset of aphasia and right-sided weakness. He was seen by  Interventional Neurology. The usual battery of tests was completed and  the patient did receive t-PA within the window with good results. MEDICATIONS:  He takes Proscar 5 mg daily, vitamin D 2000 units daily,  levothyroxine 75 mcg daily, recently started Remeron 15 mg at nightly. PAST MEDICAL HISTORY:  Lung cancer, laparoscopic partial left lung  lobectomy in 2022, peripheral artery disease with interventions in  the past, chronic obstructive pulmonary disease, hypothyroidism, carotid  artery stenosis, clinical depression, prostate carcinoma, macular  degeneration. SOCIAL HISTORY:  Longstanding history of cigarette smoking for 65 years,  just recently stopped. FAMILY HISTORY:  Not available. REVIEW OF SYSTEMS:  NEUROMUSCULAR:  See history of present illness. RESPIRATORY:  Shortness of breath chronically. No cough, fevers, night  sweats. CARDIOVASCULAR:  No chest pains or palpitation. GASTROINTESTINAL:  He has had recent weight loss.   No nausea, vomiting,  diarrhea. No melena. No hematochezia. GENITOURINARY:  Deferred. RECTAL:  Denies. MUSCULOSKELETAL:  Denies. PHYSICAL EXAMINATION:  GENERAL:  The patient is a pleasant 67-year-old male who is alert and  oriented, no acute distress at the time of my examination. HEENT:  Head normal size and shape. Pupils equal and reactive. Sclerae  were clear. Extraocular movements were intact. Fundi were not  visualized. Tympanic membranes were not visualized. Oral mucous  membranes were clear. NECK:  Supple. Neck veins were flat. Bilateral carotid bruits were  noted. No palpable extrinsic neck masses or regional lymphadenopathy. LUNGS:  Diminished breath sounds bilaterally. CARDIOVASCULAR:  Regular rate and rhythm. ABDOMEN:  Soft and flat. No masses or tenderness. GENITOURINARY:  Deferred. RECTAL:  Exam deferred. EXTREMITIES:  No edema, no clubbing, no cyanosis. Diminished distal  pulses. NEUROLOGIC:  No focal neurologic deficits. CLINICAL IMPRESSION:  1. Status post acute left hemispheric cerebral infarction. 2.  Status post t-PA. 3.  Recent left lung lobectomy for lung cancer. 4.  Peripheral artery disease. 5.  Chronic obstructive pulmonary disease. 6.  Hypothyroidism. 7.  Carotid artery stenosis. 8.  Depression. 9.  History of prostate carcinoma.         Ivan Renee DO    D: 05/13/2022 15:52:36       T: 05/13/2022 15:55:17     [email or web address]  Job#: [id number]     Doc#: [id number]    CC:

## 2022-05-14 LAB
ANION GAP SERPL CALCULATED.3IONS-SCNC: 11 MMOL/L (ref 7–16)
BASOPHILS ABSOLUTE: 0.01 E9/L (ref 0–0.2)
BASOPHILS RELATIVE PERCENT: 0.2 % (ref 0–2)
BUN BLDV-MCNC: 17 MG/DL (ref 6–23)
CALCIUM SERPL-MCNC: 9 MG/DL (ref 8.6–10.2)
CHLORIDE BLD-SCNC: 101 MMOL/L (ref 98–107)
CO2: 24 MMOL/L (ref 22–29)
CREAT SERPL-MCNC: 1.3 MG/DL (ref 0.7–1.2)
EOSINOPHILS ABSOLUTE: 0.22 E9/L (ref 0.05–0.5)
EOSINOPHILS RELATIVE PERCENT: 4.4 % (ref 0–6)
GFR AFRICAN AMERICAN: >60
GFR NON-AFRICAN AMERICAN: 53 ML/MIN/1.73
GLUCOSE BLD-MCNC: 98 MG/DL (ref 74–99)
HCT VFR BLD CALC: 40.1 % (ref 37–54)
HEMOGLOBIN: 13.8 G/DL (ref 12.5–16.5)
IMMATURE GRANULOCYTES #: 0.01 E9/L
IMMATURE GRANULOCYTES %: 0.2 % (ref 0–5)
LYMPHOCYTES ABSOLUTE: 1 E9/L (ref 1.5–4)
LYMPHOCYTES RELATIVE PERCENT: 20.1 % (ref 20–42)
MAGNESIUM: 2.2 MG/DL (ref 1.6–2.6)
MCH RBC QN AUTO: 29.7 PG (ref 26–35)
MCHC RBC AUTO-ENTMCNC: 34.4 % (ref 32–34.5)
MCV RBC AUTO: 86.2 FL (ref 80–99.9)
MONOCYTES ABSOLUTE: 0.8 E9/L (ref 0.1–0.95)
MONOCYTES RELATIVE PERCENT: 16.1 % (ref 2–12)
NEUTROPHILS ABSOLUTE: 2.93 E9/L (ref 1.8–7.3)
NEUTROPHILS RELATIVE PERCENT: 59 % (ref 43–80)
PDW BLD-RTO: 13.7 FL (ref 11.5–15)
PHOSPHORUS: 2.8 MG/DL (ref 2.5–4.5)
PLATELET # BLD: 259 E9/L (ref 130–450)
PMV BLD AUTO: 9.7 FL (ref 7–12)
POTASSIUM REFLEX MAGNESIUM: 3.9 MMOL/L (ref 3.5–5)
RBC # BLD: 4.65 E12/L (ref 3.8–5.8)
SODIUM BLD-SCNC: 136 MMOL/L (ref 132–146)
WBC # BLD: 5 E9/L (ref 4.5–11.5)

## 2022-05-14 PROCEDURE — 2060000000 HC ICU INTERMEDIATE R&B

## 2022-05-14 PROCEDURE — 2580000003 HC RX 258: Performed by: EMERGENCY MEDICINE

## 2022-05-14 PROCEDURE — 85025 COMPLETE CBC W/AUTO DIFF WBC: CPT

## 2022-05-14 PROCEDURE — 80048 BASIC METABOLIC PNL TOTAL CA: CPT

## 2022-05-14 PROCEDURE — 36415 COLL VENOUS BLD VENIPUNCTURE: CPT

## 2022-05-14 PROCEDURE — 6370000000 HC RX 637 (ALT 250 FOR IP): Performed by: CLINICAL NURSE SPECIALIST

## 2022-05-14 PROCEDURE — 97129 THER IVNTJ 1ST 15 MIN: CPT | Performed by: SPEECH-LANGUAGE PATHOLOGIST

## 2022-05-14 PROCEDURE — 84100 ASSAY OF PHOSPHORUS: CPT

## 2022-05-14 PROCEDURE — 99232 SBSQ HOSP IP/OBS MODERATE 35: CPT | Performed by: SURGERY

## 2022-05-14 PROCEDURE — 83735 ASSAY OF MAGNESIUM: CPT

## 2022-05-14 PROCEDURE — 92523 SPEECH SOUND LANG COMPREHEN: CPT | Performed by: SPEECH-LANGUAGE PATHOLOGIST

## 2022-05-14 PROCEDURE — 99232 SBSQ HOSP IP/OBS MODERATE 35: CPT | Performed by: CLINICAL NURSE SPECIALIST

## 2022-05-14 RX ORDER — ASPIRIN 81 MG/1
81 TABLET, CHEWABLE ORAL DAILY
Status: DISCONTINUED | OUTPATIENT
Start: 2022-05-14 | End: 2022-05-17 | Stop reason: HOSPADM

## 2022-05-14 RX ORDER — CLOPIDOGREL BISULFATE 75 MG/1
75 TABLET ORAL DAILY
Status: DISCONTINUED | OUTPATIENT
Start: 2022-05-14 | End: 2022-05-17 | Stop reason: HOSPADM

## 2022-05-14 RX ADMIN — CLOPIDOGREL BISULFATE 75 MG: 75 TABLET ORAL at 09:00

## 2022-05-14 RX ADMIN — ATORVASTATIN CALCIUM 40 MG: 40 TABLET, FILM COATED ORAL at 20:31

## 2022-05-14 RX ADMIN — SODIUM CHLORIDE, PRESERVATIVE FREE 10 ML: 5 INJECTION INTRAVENOUS at 09:00

## 2022-05-14 RX ADMIN — ASPIRIN 81 MG 81 MG: 81 TABLET ORAL at 09:00

## 2022-05-14 RX ADMIN — SODIUM CHLORIDE, PRESERVATIVE FREE 10 ML: 5 INJECTION INTRAVENOUS at 20:31

## 2022-05-14 NOTE — PROGRESS NOTES
Dominic Tomlinson is a [de-identified] y.o. right handed man    Patient was admitted with sudden onset of aphasia and right arm weakness. LKW was shortly before arrival.    stroke alert called and he was a candidate for IV tPA -- NIHSS was 7    Markedly improved -- NIHSS 0     No further focal weakness.     2 recent ED evaluations (in last 2 weeks) -- fatigue and lightheadedness   Recent biopsy of lung mass (at [place]) -- states it was cancer but has not been contacted to start chemo or radiation   MRI report recommended continued monitoring      No family present -- he appears to be a good historian     Objective:   /76   Pulse 71   Temp 97.3 °F (36.3 °C) (Oral)   Resp 19   Ht 5' 7\" (1.702 m)   Wt 137 lb 3.2 oz (62.2 kg)   SpO2 99%   BMI 21.49 kg/m²      General appearance: alert, appears stated age and cooperative  Head: Normocephalic, without obvious abnormality, atraumatic  Extremities: no cyanosis or edema  Pulses: 2+ and symmetric  Skin: no rashes or lesions     Mental Status: Alert, oriented to person place and year     Speech: clear  Language: appropriate     Cranial Nerves:  I: smell    II: visual acuity     II: visual fields Full    II: pupils DUNCAN   III,VII: ptosis None   III,IV,VI: extraocular muscles  EOMI without nystagmus    V: mastication Normal   V: facial light touch sensation  Normal   V,VII: corneal reflex  Present   VII: facial muscle function - upper     VII: facial muscle function - lower Normal   VIII: hearing Normal   IX: soft palate elevation  Normal   IX,X: gag reflex    XI: trapezius strength  5/5   XI: sternocleidomastoid strength 5/5   XI: neck extension strength  5/5   XII: tongue strength  Normal     Motor:  5/5 throughout  Normal bulk and tone     Sensory:  LT normal  Vibration normal     Coordination:   FN, FFM and APOORVA normal    DTR:   No reflexes    No Babinski  No Gutierrez's     Bilateral palmar grasps     NIH Stroke Scale:    1A: Level of Consciousness 0 - alert; keenly responsive   1B: Ask Month and Age 0 - answers both questions correctly   1C:  Tell Patient To Open and Close Eyes, then Hand  Squeeze 0 - performs both tasks correctly   2: Test Horizontal Extraocular Movements 0 - normal   3: Test Visual Fields 0 - no visual loss   4: Test Facial Palsy 0 - normal symmetric movement   5A: Test Left Arm Motor Drift 0 - no drift, limb holds 90 (or 45) degrees for full 10 seconds   5B: Test Right Arm Motor Drift 0 - no drift, limb holds 90 (or 45) degrees for full 10 seconds   6A: Test Left Leg Motor Drift 0 - no drift; leg holds 30 degree position for full 5 seconds   6B: Test Right Leg Motor Drift 0 - no drift; leg holds 30 degree position for full 5 seconds   7: Test Limb Ataxia   (FNF/Heel-Shin) 0 - absent   8: Test Sensation 0 - normal; no sensory loss   9: Test Language/Aphasia 0 - no aphasia, normal   10: Test Dysarthria 0 - normal   11: Test Extinction/Inattention 0 - no abnormality   Total 0       Laboratory/Radiology:     CBC with Differential:    Lab Results   Component Value Date    WBC 5.0 05/14/2022    RBC 4.65 05/14/2022    HGB 13.8 05/14/2022    HCT 40.1 05/14/2022     05/14/2022    MCV 86.2 05/14/2022    MCH 29.7 05/14/2022    MCHC 34.4 05/14/2022    RDW 13.7 05/14/2022    LYMPHOPCT 20.1 05/14/2022    MONOPCT 16.1 05/14/2022    BASOPCT 0.2 05/14/2022    MONOSABS 0.80 05/14/2022    LYMPHSABS 1.00 05/14/2022    EOSABS 0.22 05/14/2022    BASOSABS 0.01 05/14/2022     CMP:    Lab Results   Component Value Date     05/14/2022    K 3.9 05/14/2022     05/14/2022    CO2 24 05/14/2022    BUN 17 05/14/2022    CREATININE 1.3 05/14/2022    GFRAA >60 05/14/2022    LABGLOM 53 05/14/2022    GLUCOSE 98 05/14/2022    PROT 5.4 05/13/2022    LABALBU 3.4 05/13/2022    CALCIUM 9.0 05/14/2022    BILITOT 0.5 05/13/2022    ALKPHOS 91 05/13/2022    AST 15 05/13/2022    ALT 9 05/13/2022     FLP:    Lab Results   Component Value Date    TRIG 119 05/13/2022    HDL 40 05/13/2022    LDLCALC 90 05/13/2022    LABVLDL 24 05/13/2022     CTA:  1. Estimated stenosis of the proximal right and left internal carotid  artery by NASCET criteria is not hemodynamically significant  2. Moderate atherosclerotic disease . 3. Occluded left A3 segment    CTP:  Small region of ischemia in the distribution of the left anterior  cerebral artery            MRI Brain:  Likely acute to subacute lacunar infarcts within the subcortical and deep  white matter of the left cerebral hemisphere. Recommend follow-up to exclude the presence of underlying brain metastases. I independently reviewed the labs and imaging studies today. Assessment:     Left JOSELYN stroke s/p IV tPA with marked resolution in his s/s   Now with NIHSS 0 but cognitively seems delayed   Will continue to monitor   Cardiac workup will ensue     ? recent lung cancer -- biopsy February but no follow-up since    ?hypercoagulable from underlying cancer     ?  Underlying cognitive difficulties -- ?palmar grasps     Plan:     Echo with bubble and possible cardiac event monitoring on discharge    DAPT for 21 days followed by ASA monotherapy afterwards     Statin    SBP goal of  <180/105 x 24 hours then <140/90    Anticipate stroke clinic follow-up     OSBALDO Calvin - CNS  9:19 AM  5/14/2022

## 2022-05-14 NOTE — PLAN OF CARE
Problem: Chronic Conditions and Co-morbidities  Goal: Patient's chronic conditions and co-morbidity symptoms are monitored and maintained or improved  5/14/2022 1141 by J Luis Garcia RN  Outcome: Progressing  5/14/2022 0552 by Oskar Loera RN  Outcome: Progressing     Problem: Discharge Planning  Goal: Discharge to home or other facility with appropriate resources  5/14/2022 1141 by J Luis Garcia RN  Outcome: Progressing  5/14/2022 0552 by Oskar Loera RN  Outcome: Progressing     Problem: Pain  Goal: Verbalizes/displays adequate comfort level or baseline comfort level  5/14/2022 1141 by J Luis Garcia RN  Outcome: Progressing  5/14/2022 0552 by Oskar Loera RN  Outcome: Progressing     Problem: Skin/Tissue Integrity  Goal: Absence of new skin breakdown  Description: 1. Monitor for areas of redness and/or skin breakdown  2. Assess vascular access sites hourly  3. Every 4-6 hours minimum:  Change oxygen saturation probe site  4. Every 4-6 hours:  If on nasal continuous positive airway pressure, respiratory therapy assess nares and determine need for appliance change or resting period.   5/14/2022 1141 by J Luis Garcia RN  Outcome: Progressing  5/14/2022 0552 by Oskar Loera RN  Outcome: Progressing     Problem: Safety - Adult  Goal: Free from fall injury  5/14/2022 1141 by J Luis Garcia RN  Outcome: Progressing  5/14/2022 0552 by Oksar Loera RN  Outcome: Progressing     Problem: ABCDS Injury Assessment  Goal: Absence of physical injury  5/14/2022 1141 by J Luis Garcia RN  Outcome: Progressing  5/14/2022 0552 by Oskar Loera RN  Outcome: Progressing

## 2022-05-14 NOTE — PLAN OF CARE
Problem: Chronic Conditions and Co-morbidities  Goal: Patient's chronic conditions and co-morbidity symptoms are monitored and maintained or improved  5/14/2022 1741 by Geneva Ewing RN  Outcome: Progressing  Flowsheets (Taken 5/14/2022 1330)  Care Plan - Patient's Chronic Conditions and Co-Morbidity Symptoms are Monitored and Maintained or Improved: Monitor and assess patient's chronic conditions and comorbid symptoms for stability, deterioration, or improvement  5/14/2022 1141 by Valorie Palm RN  Outcome: Progressing  5/14/2022 0552 by Prashanth Webb RN  Outcome: Progressing     Problem: Pain  Goal: Verbalizes/displays adequate comfort level or baseline comfort level  5/14/2022 1741 by Geneva Ewing RN  Outcome: Progressing  5/14/2022 1141 by Valorie Palm RN  Outcome: Progressing  5/14/2022 0552 by Prashanth Webb RN  Outcome: Progressing     Problem: Skin/Tissue Integrity  Goal: Absence of new skin breakdown  Description: 1. Monitor for areas of redness and/or skin breakdown  2. Assess vascular access sites hourly  3. Every 4-6 hours minimum:  Change oxygen saturation probe site  4. Every 4-6 hours:  If on nasal continuous positive airway pressure, respiratory therapy assess nares and determine need for appliance change or resting period.   5/14/2022 1741 by Geneva Ewing RN  Outcome: Progressing  5/14/2022 1141 by Valorie Palm RN  Outcome: Progressing  5/14/2022 0552 by Prashanth Webb RN  Outcome: Progressing     Problem: Safety - Adult  Goal: Free from fall injury  5/14/2022 1741 by Geneva Ewing RN  Outcome: Progressing  5/14/2022 1141 by Valorie Palm RN  Outcome: Progressing  5/14/2022 0552 by Prashanth Webb RN  Outcome: Progressing

## 2022-05-14 NOTE — PROGRESS NOTES
SPEECH/LANGUAGE PATHOLOGY  SPEECH/LANGUAGE/COGNITIVE EVALUATION   and PLAN OF CARE      PATIENT NAME:  Jesusita Young  (male)     MRN:  [id number]    :  1940  ([de-identified] y.o.)  STATUS:  Inpatient: Room 3820/3820-A    TODAY'S DATE:  22 1545   SLP eval and treat Start: 22, End: 22, ONE TIME, Standing Count: 1 Occurrences, R    Chrystine Toyin, APRN - CNP  REASON FOR REFERRAL:  Admit with Aphasia and R weakness; s/p tPA  EVALUATING THERAPIST: Lucinda Jeans, SLP    ADMITTING DIAGNOSIS: Acute CVA (cerebrovascular accident) ([facility] [I63.9]  Cerebrovascular accident (CVA) due to occlusion of left anterior cerebral artery ([facility] [address] [I63.522]    VISIT DIAGNOSIS:        SPEECH THERAPY  PLAN OF CARE   The speech therapy  POC is established based on physician order, speech pathology diagnosis and results of clinical assessment     SPEECH PATHOLOGY DIAGNOSIS:    Mild cognitive linguistic impairment     Speech Pathology intervention is recommended up to 6 times per week for LOS or when goals are met with emphasis on the following:   Anticipate Patient will benefit from ongoing intensive speech therapy at discharge due to degree of deficits     Conditions Requiring Skilled Therapeutic Intervention for speech, language and/or cognition    Cognitive linguistic impairment    Specific Speech Therapy Interventions to Include: Therapeutic tasks for Cognition    Specific instructions for next treatment:      To initiate POC    SHORT/LONG TERM GOALS  Pt will improve immediate, short term, recent memory during structured and unstructured tasks with 90% accuracy   Pt will improve problem solving/thought organization during structured and unstructured tasks with 90% accuracy   Pt will improve receptive and expressive language skills with adequate thought content, organization, and processing time to facilitate improved communication with minimal.    Patient goals: Patient/family involved in developing Pathologist (SLP) completed education regarding results of evaluation and that intervention is warranted at this time. Learner: Patient, Significant Other and Family  Education: Reviewed results and recommendations of this evaluation  Evaluation of Education:  Verbalizes understanding    Evaluation Time includes thorough review of current medical information, gathering information on past medical history/social history and prior level of function, completion of standardized testing/informal observation of tasks, assessment of data and education on plan of care and goals. CPT code:    57448  eval speech sound lang comprehension    INTERVENTION  CPT Code: 94565  therapeutic interventions that focus on cognitive function , initial  15 min    Speech Pathologist (SLP) completed education with the patient and/or family regarding type of cognitive impairment. Discussed compensatory strategies to assist in improving attention, STM/LTM, problem solving, abstract reasoning and safety/insight. Encouraged patient and/or family to engage SLP in structured Q&A session relative to identified deficit areas. Patient and/or family indicated understanding of all information provided via satisfactory verbal response. The admitting diagnosis and active problem list, as listed below have been reviewed prior to initiation of this evaluation.         ACTIVE PROBLEM LIST:   Patient Active Problem List   Diagnosis    Acquired hypothyroidism    PVD (peripheral vascular disease) ([address]    CKD (chronic kidney disease) stage 3, GFR 30-59 ml/min ([facility])    Metabolic encephalopathy    Acute bronchitis due to Rhinovirus    CAP (community acquired pneumonia) due to Chlamydia species    Pneumonia    Acute CVA (cerebrovascular accident) ([address]    Cerebrovascular accident (CVA) due to occlusion of left anterior cerebral artery ([address]       Patricia Barbosa CCC-SLP  Speech-Language Pathologist  [id number]  5/14/2022

## 2022-05-14 NOTE — PROGRESS NOTES
Doing very well  Alert and oriented  Afebrile , vs stable  RRR  Lungs clear  Neuro intact  Continue present RX

## 2022-05-14 NOTE — PROGRESS NOTES
[facility]  SURGICAL INTENSIVE CARE UNIT (SICU)  ATTENDING PHYSICIAN CRITICAL CARE PROGRESS NOTE     I have examined the patient, reviewed the record, and discussed the case with the APN/ resident. Please refer to the APN/ resident's note. I agree with the assessment and plan. I have reviewed all relevant labs and imaging data. The following summarizes my clinical findings and independent assessment. CC:  Critical care management after stroke; tPA    Hospital Course/Overnight Events:  5/12--presented with right sided weakness and aphasia--given t-PA  5/13--no issues overnight  5/14--nothing new    Pt without complaints. Denies headache. Doesn't like the food but tolerating diet.     Awake and alert  Follows commands  Hrt:  Regular rate/rhythm; no murmur  Lungs:  Fairly clear bilaterally  Abd:  Soft; BS active; NT/ND  Skin:  Warm/dry  Ext:  Strength 5/5 bilateral upper/lower ext    Labs personally reviewed    Patient Active Problem List    Diagnosis Date Noted    Cerebrovascular accident (CVA) due to occlusion of left anterior cerebral artery (HCC)     Acute CVA (cerebrovascular accident) ([facility] [address] 05/12/2022    CAP (community acquired pneumonia) due to Chlamydia species 11/08/2019    Pneumonia 11/08/2019    CKD (chronic kidney disease) stage 3, GFR 30-59 ml/min ([facility]) 91/55/1546    Metabolic encephalopathy 73/74/3052    Acute bronchitis due to Rhinovirus 06/18/2019    PVD (peripheral vascular disease) ([facility] [address] 03/06/2019    Acquired hypothyroidism 03/05/2019     Acute stroke--s/p t-PA--monitor neuro exam  ASA/Plavix  Statin  Echo  Chronic renal insuff--monitor BUN/Cr/UO  Hypoalbuminemia--diet as tolerated  PT/OT evals  Speech eval  DVT risk--PCDs    Pt is at risk for neurologic/metabolic deterioration     Gildardo Benitez MD, FACS  5/14/2022  12:09 PM

## 2022-05-14 NOTE — PROGRESS NOTES
[facility]  Critical Care  Daily Progress Note 5/14/2022    Date of Admission:5/12/22    CC:  Stroke s/p tPA      HOSPITAL EVENTS  5/12 presented to ED with cc aphasia, right sided weakness. NIHSS 7. TPA administered at [address] [facility] PM. Not candidate for thrombectomy. Admitted to NSICU.   5/13 Neuro deficits resolving. No significant events overnight  5/14     OVERNIGHT EVENTS: T max  98. F. Did not require additional doses of antihypertensive agents or units of insulin in the previous 24 hours. PHYSICAL EXAM:    /72   Pulse 73   Temp 97.3 °F (36.3 °C) (Oral)   Resp 19   Ht 5' 7\" (1.702 m)   Wt 137 lb 3.2 oz (62.2 kg)   SpO2 99%   BMI 21.49 kg/m²     Intake/Output Summary (Last 24 hours) at 5/14/2022 1015  Last data filed at 5/14/2022 0400  Gross per 24 hour   Intake 1200 ml   Output 925 ml   Net 275 ml         General appearance:  Comfortable. NEUROLOGIC:        GCS:    4 - Opens eyes on own   6 - Follows simple motor commands  5 - Alert and oriented       Pupil size:  Left 3 mm  Right 3 mm  Pupil reaction: Yes   PERRLA  Wiggles fingers: Left Yes Right Yes  Hand grasp:   Left present     Right present  Wiggles toes: Left Yes    Right Yes  Plantar flexion: Left present    Right present  Facial droop:   None   Speech:  Clear    CONSTITUTIONAL: No acute distress  CARDIOVASCULAR: S1 S2, regular rate, regular rhythm,Monitor: SR  PULMONARY:  Respirations unlabored. No rhonchi/rales/wheezes. RENAL: External catheter, clear yellow urine. ABDOMEN: Soft, nontender, nondistended, nontympanic, normal bowel sounds. MUSCULOSKELETAL: MORRISSEY  SKIN/EXTREMITIES: No rashes/ecchymosis, no edema/clubbing, warm/dry, good capillary refill.      IV ACCESS: PIV      ASSESSMENT/PLAN:     Principal Problem:    Acute CVA (cerebrovascular accident) [facility])  Active Problems:    Cerebrovascular accident (CVA) due to occlusion of left anterior cerebral artery ([address]  Resolved Problems:    * No resolved hospital problems. *      Neuro:  Stroke s/p tPA. Intermittent episodes of expressive aphasia. Neurology following. Monitor neuro status. Stroke protocol. Stroke education. Speech therapy. CT. MRI  CV No acute issues. SBP goal <160 mm Hg. Pulm:No acute issues. Monitor respiratory status  GI: Passed bedside swallow. Diet Monitor bowel status  Renal: Elevated SCr (appears at baseline)  Monitor uop, renal function and electrolytes. ID: No leukocytosis. Afebrile. Endocrine: Hyperglycemia. Follow labs     MSK:. Deconditioned. PT/OT   Heme: No acute issues. Monitor CBC. Bowel regime: glycolax  Pain control/Sedation:  Acetaminophen  DVT prophylaxis: SCDs. GI prophylaxis: Diet  Glucose protocol:  Labs  Mouth/Eye care: As needed. Consults: Neurology. Medicine  Patient/Family update: Son and patient updated on patient status and  plan at bedside. Questions answered   Code status: Full        Disposition: Transfer from NSICU.         ELIOT Benavides  5/14/2022  10:15 AM

## 2022-05-15 DIAGNOSIS — I63.412 CEREBROVASCULAR ACCIDENT (CVA) DUE TO EMBOLISM OF LEFT MIDDLE CEREBRAL ARTERY (HCC): Primary | ICD-10-CM

## 2022-05-15 DIAGNOSIS — I67.848 OTHER CEREBROVASCULAR VASOSPASM AND VASOCONSTRICTION: ICD-10-CM

## 2022-05-15 LAB
ANION GAP SERPL CALCULATED.3IONS-SCNC: 11 MMOL/L (ref 7–16)
BASOPHILS ABSOLUTE: 0.03 E9/L (ref 0–0.2)
BASOPHILS RELATIVE PERCENT: 0.5 % (ref 0–2)
BUN BLDV-MCNC: 19 MG/DL (ref 6–23)
CALCIUM SERPL-MCNC: 9 MG/DL (ref 8.6–10.2)
CHLORIDE BLD-SCNC: 104 MMOL/L (ref 98–107)
CO2: 23 MMOL/L (ref 22–29)
CREAT SERPL-MCNC: 1.1 MG/DL (ref 0.7–1.2)
EOSINOPHILS ABSOLUTE: 0.21 E9/L (ref 0.05–0.5)
EOSINOPHILS RELATIVE PERCENT: 3.4 % (ref 0–6)
GFR AFRICAN AMERICAN: >60
GFR NON-AFRICAN AMERICAN: >60 ML/MIN/1.73
GLUCOSE BLD-MCNC: 109 MG/DL (ref 74–99)
HCT VFR BLD CALC: 40.1 % (ref 37–54)
HEMOGLOBIN: 13.5 G/DL (ref 12.5–16.5)
IMMATURE GRANULOCYTES #: 0.02 E9/L
IMMATURE GRANULOCYTES %: 0.3 % (ref 0–5)
LYMPHOCYTES ABSOLUTE: 0.97 E9/L (ref 1.5–4)
LYMPHOCYTES RELATIVE PERCENT: 15.9 % (ref 20–42)
MAGNESIUM: 2.2 MG/DL (ref 1.6–2.6)
MCH RBC QN AUTO: 28.1 PG (ref 26–35)
MCHC RBC AUTO-ENTMCNC: 33.7 % (ref 32–34.5)
MCV RBC AUTO: 83.5 FL (ref 80–99.9)
MONOCYTES ABSOLUTE: 0.85 E9/L (ref 0.1–0.95)
MONOCYTES RELATIVE PERCENT: 14 % (ref 2–12)
NEUTROPHILS ABSOLUTE: 4.01 E9/L (ref 1.8–7.3)
NEUTROPHILS RELATIVE PERCENT: 65.9 % (ref 43–80)
PDW BLD-RTO: 13.4 FL (ref 11.5–15)
PHOSPHORUS: 3 MG/DL (ref 2.5–4.5)
PLATELET # BLD: 228 E9/L (ref 130–450)
PMV BLD AUTO: 8.9 FL (ref 7–12)
POTASSIUM REFLEX MAGNESIUM: 3.7 MMOL/L (ref 3.5–5)
RBC # BLD: 4.8 E12/L (ref 3.8–5.8)
SODIUM BLD-SCNC: 138 MMOL/L (ref 132–146)
WBC # BLD: 6.1 E9/L (ref 4.5–11.5)

## 2022-05-15 PROCEDURE — 97165 OT EVAL LOW COMPLEX 30 MIN: CPT

## 2022-05-15 PROCEDURE — 80048 BASIC METABOLIC PNL TOTAL CA: CPT

## 2022-05-15 PROCEDURE — 97535 SELF CARE MNGMENT TRAINING: CPT

## 2022-05-15 PROCEDURE — 84100 ASSAY OF PHOSPHORUS: CPT

## 2022-05-15 PROCEDURE — 85025 COMPLETE CBC W/AUTO DIFF WBC: CPT

## 2022-05-15 PROCEDURE — 99232 SBSQ HOSP IP/OBS MODERATE 35: CPT | Performed by: CLINICAL NURSE SPECIALIST

## 2022-05-15 PROCEDURE — 2060000000 HC ICU INTERMEDIATE R&B

## 2022-05-15 PROCEDURE — 2580000003 HC RX 258: Performed by: EMERGENCY MEDICINE

## 2022-05-15 PROCEDURE — 36415 COLL VENOUS BLD VENIPUNCTURE: CPT

## 2022-05-15 PROCEDURE — 6370000000 HC RX 637 (ALT 250 FOR IP): Performed by: CLINICAL NURSE SPECIALIST

## 2022-05-15 PROCEDURE — 83735 ASSAY OF MAGNESIUM: CPT

## 2022-05-15 RX ORDER — HYDRALAZINE HYDROCHLORIDE 20 MG/ML
10 INJECTION INTRAMUSCULAR; INTRAVENOUS EVERY 4 HOURS PRN
Status: DISCONTINUED | OUTPATIENT
Start: 2022-05-15 | End: 2022-05-17 | Stop reason: HOSPADM

## 2022-05-15 RX ORDER — LABETALOL HYDROCHLORIDE 5 MG/ML
10 INJECTION, SOLUTION INTRAVENOUS EVERY 4 HOURS PRN
Status: DISCONTINUED | OUTPATIENT
Start: 2022-05-15 | End: 2022-05-17 | Stop reason: HOSPADM

## 2022-05-15 RX ADMIN — SODIUM CHLORIDE, PRESERVATIVE FREE 10 ML: 5 INJECTION INTRAVENOUS at 10:17

## 2022-05-15 RX ADMIN — ATORVASTATIN CALCIUM 40 MG: 40 TABLET, FILM COATED ORAL at 20:40

## 2022-05-15 RX ADMIN — ASPIRIN 81 MG 81 MG: 81 TABLET ORAL at 10:17

## 2022-05-15 RX ADMIN — CLOPIDOGREL BISULFATE 75 MG: 75 TABLET ORAL at 10:17

## 2022-05-15 RX ADMIN — SODIUM CHLORIDE, PRESERVATIVE FREE 10 ML: 5 INJECTION INTRAVENOUS at 20:40

## 2022-05-15 ASSESSMENT — PAIN SCALES - GENERAL
PAINLEVEL_OUTOF10: 0
PAINLEVEL_OUTOF10: 0

## 2022-05-15 NOTE — PROGRESS NOTES
Dian Khan is a [de-identified] y.o. right handed man    Patient was admitted with sudden onset of aphasia and right arm weakness. LKW was shortly before arrival.    stroke alert called and he was a candidate for IV tPA -- NIHSS was 7    Markedly improved -- NIHSS 0     No further focal weakness.     2 recent ED evaluations (in last 2 weeks) -- fatigue and lightheadedness   Recent biopsy of lung mass (at [place]) -- states it was cancer but has not been contacted to start chemo or radiation   MRI report recommended continued monitoring -- echo pending and event monitor ordered for discharge     Updated family at length yesterday   Imaging personally reviewed with them as well    Wife does report progressive memory deficits over the past year or so     Objective:   BP (!) 175/67   Pulse 71   Temp 97.6 °F (36.4 °C) (Temporal)   Resp 18   Ht 5' 7\" (1.702 m)   Wt 137 lb 3.2 oz (62.2 kg)   SpO2 100%   BMI 21.49 kg/m²      General appearance: alert, appears stated age and cooperative  Head: Normocephalic, without obvious abnormality, atraumatic  Extremities: no cyanosis or edema  Pulses: 2+ and symmetric  Skin: no rashes or lesions     Mental Status: Alert, oriented to person place and year     Speech: clear  Language: appropriate     Cranial Nerves:  I: smell    II: visual acuity     II: visual fields Full    II: pupils DUNCAN   III,VII: ptosis None   III,IV,VI: extraocular muscles  EOMI without nystagmus    V: mastication Normal   V: facial light touch sensation  Normal   V,VII: corneal reflex  Present   VII: facial muscle function - upper     VII: facial muscle function - lower Normal   VIII: hearing Normal   IX: soft palate elevation  Normal   IX,X: gag reflex    XI: trapezius strength  5/5   XI: sternocleidomastoid strength 5/5   XI: neck extension strength  5/5   XII: tongue strength  Normal     Motor:  5/5 throughout  Normal bulk and tone     Sensory:  LT normal  Vibration normal     Coordination:   FN, FFM and APOORVA normal    DTR:   No reflexes    No Babinski  No Gutierrez's     Bilateral palmar grasps     NIH Stroke Scale:    1A: Level of Consciousness 0 - alert; keenly responsive   1B: Ask Month and Age 0 - answers both questions correctly   1C:  Tell Patient To Open and Close Eyes, then Hand  Squeeze 0 - performs both tasks correctly   2: Test Horizontal Extraocular Movements 0 - normal   3: Test Visual Fields 0 - no visual loss   4: Test Facial Palsy 0 - normal symmetric movement   5A: Test Left Arm Motor Drift 0 - no drift, limb holds 90 (or 45) degrees for full 10 seconds   5B: Test Right Arm Motor Drift 0 - no drift, limb holds 90 (or 45) degrees for full 10 seconds   6A: Test Left Leg Motor Drift 0 - no drift; leg holds 30 degree position for full 5 seconds   6B: Test Right Leg Motor Drift 0 - no drift; leg holds 30 degree position for full 5 seconds   7: Test Limb Ataxia   (FNF/Heel-Shin) 0 - absent   8: Test Sensation 0 - normal; no sensory loss   9: Test Language/Aphasia 0 - no aphasia, normal   10: Test Dysarthria 0 - normal   11: Test Extinction/Inattention 0 - no abnormality   Total 0       Laboratory/Radiology:     CBC with Differential:    Lab Results   Component Value Date    WBC 6.1 05/15/2022    RBC 4.80 05/15/2022    HGB 13.5 05/15/2022    HCT 40.1 05/15/2022     05/15/2022    MCV 83.5 05/15/2022    MCH 28.1 05/15/2022    MCHC 33.7 05/15/2022    RDW 13.4 05/15/2022    LYMPHOPCT 15.9 05/15/2022    MONOPCT 14.0 05/15/2022    BASOPCT 0.5 05/15/2022    MONOSABS 0.85 05/15/2022    LYMPHSABS 0.97 05/15/2022    EOSABS 0.21 05/15/2022    BASOSABS 0.03 05/15/2022     CMP:    Lab Results   Component Value Date     05/15/2022    K 3.7 05/15/2022     05/15/2022    CO2 23 05/15/2022    BUN 19 05/15/2022    CREATININE 1.1 05/15/2022    GFRAA >60 05/15/2022    LABGLOM >60 05/15/2022    GLUCOSE 109 05/15/2022    PROT 5.4 05/13/2022    LABALBU 3.4 05/13/2022    CALCIUM 9.0 05/15/2022    BILITOT 0.5 05/13/2022    ALKPHOS 91 05/13/2022    AST 15 05/13/2022    ALT 9 05/13/2022     FLP:    Lab Results   Component Value Date    TRIG 119 05/13/2022    HDL 40 05/13/2022    LDLCALC 90 05/13/2022    LABVLDL 24 05/13/2022     CTA:  1. Estimated stenosis of the proximal right and left internal carotid  artery by NASCET criteria is not hemodynamically significant  2. Moderate atherosclerotic disease . 3. Occluded left A3 segment    CTP:  Small region of ischemia in the distribution of the left anterior  cerebral artery            MRI Brain:  Likely acute to subacute lacunar infarcts within the subcortical and deep  white matter of the left cerebral hemisphere. Recommend follow-up to exclude the presence of underlying brain metastases. I independently reviewed the labs and imaging studies today. Assessment:     Left JOSELYN stroke s/p IV tPA with marked resolution in his s/s   Now with NIHSS 0 but cognitively seems delayed   Will continue to monitor   Cardiac workup will ensue     ? recent lung cancer -- biopsy February but no follow-up since    ?hypercoagulable from underlying cancer     ?  Underlying cognitive difficulties -- ?palmar grasps     Plan:     Echo with bubble and cardiac event monitoring on discharge    DAPT for 21 days followed by ASA monotherapy afterwards     Statin    SBP goal of  <180/105 x 24 hours then <140/90    Anticipate stroke clinic follow-up     OSBALDO Banuelos - CNS  9:14 AM  5/15/2022

## 2022-05-15 NOTE — PLAN OF CARE
Problem: Chronic Conditions and Co-morbidities  Goal: Patient's chronic conditions and co-morbidity symptoms are monitored and maintained or improved  5/14/2022 2248 by Denice Gonzalez RN  Outcome: Progressing  5/14/2022 1741 by Rosa Maria Moreno RN  Outcome: Progressing  Flowsheets (Taken 5/14/2022 1330)  Care Plan - Patient's Chronic Conditions and Co-Morbidity Symptoms are Monitored and Maintained or Improved: Monitor and assess patient's chronic conditions and comorbid symptoms for stability, deterioration, or improvement  5/14/2022 1141 by Lou Flaherty RN  Outcome: Progressing     Problem: Discharge Planning  Goal: Discharge to home or other facility with appropriate resources  5/14/2022 2248 by Denice Gonzalez RN  Outcome: Progressing  5/14/2022 1741 by Rosa Maria Moreno RN  Outcome: Progressing  Flowsheets (Taken 5/14/2022 1330)  Discharge to home or other facility with appropriate resources: Identify barriers to discharge with patient and caregiver  5/14/2022 1141 by Lou Flaherty RN  Outcome: Progressing     Problem: Pain  Goal: Verbalizes/displays adequate comfort level or baseline comfort level  5/14/2022 2248 by Denice Gonzalez RN  Outcome: Progressing  5/14/2022 1741 by Rosa Maria Moreno RN  Outcome: Progressing  5/14/2022 1141 by Lou Falherty RN  Outcome: Progressing     Problem: Skin/Tissue Integrity  Goal: Absence of new skin breakdown  Description: 1. Monitor for areas of redness and/or skin breakdown  2. Assess vascular access sites hourly  3. Every 4-6 hours minimum:  Change oxygen saturation probe site  4. Every 4-6 hours:  If on nasal continuous positive airway pressure, respiratory therapy assess nares and determine need for appliance change or resting period.   5/14/2022 2248 by Denice Gonzalez RN  Outcome: Progressing  5/14/2022 1741 by Rosa Maria Moreno RN  Outcome: Progressing  5/14/2022 1141 by Lou Flaherty RN  Outcome: Progressing     Problem: Safety - Adult  Goal: Free from fall injury  5/14/2022 2248 by Naima Knight RN  Outcome: Progressing  5/14/2022 1741 by Jojo Higgins RN  Outcome: Progressing  5/14/2022 1141 by Eulogio Mathis RN  Outcome: Progressing     Problem: ABCDS Injury Assessment  Goal: Absence of physical injury  5/14/2022 2248 by Naima Knight RN  Outcome: Progressing  5/14/2022 1741 by Jojo Higgins RN  Outcome: Progressing  5/14/2022 1141 by Eulogio Mathis RN  Outcome: Progressing

## 2022-05-15 NOTE — PROGRESS NOTES
Continues to do well  BP elevated occasionally   RRR with distant tones  Lungs clear   Continue same plan

## 2022-05-15 NOTE — PROGRESS NOTES
[address],  [place]                                                  Patient Name: Dawood Rey  MRN: [id number]  : 1940  Room: [address] [place] [address]    Evaluating OT: Christiano Caballero, [place] #[id number]    Referring Provider[de-identified] OSBALDO Combs CNP     Specific Provider Orders/Date: OT evaluation and treatment 22    Diagnosis:  Acute CVA (cerebrovascular accident) [facility]) [I63.9]  Cerebrovascular accident (CVA) due to occlusion of left anterior cerebral artery [facility]) [I63.522]      Pertinent Medical History:  has a past medical history of Cancer ([facility] [address] Dehydration, Hypertension, Inguinal hernia, Mediastinal adenopathy, Peripheral vascular disease ([facility] [address] and Thyroid disease.        Precautions:  Fall Risk,     Assessment of current deficits   [x] Functional mobility   [x]ADLs  [x] Strength               []Cognition   [x] Functional transfers   [] IADLs         [x] Safety Awareness   [x]Endurance   [] Fine Coordination              [x] Balance      [] Vision/perception   []Sensation    []Gross Motor Coordination  [] ROM  [] Delirium                   [] Motor Control     OT PLAN OF CARE   OT POC based on physician orders, patient diagnosis and results of clinical assessment    Frequency/Duration 1-4 days/wk for 2 weeks PRN   Specific OT Treatment to include:   * Instruction/training on adapted ADL techniques and AE recommendations to increase functional independence within precautions       * Functional transfer/mobility training/DME recommendations for increased independence, safety, and fall prevention  * Patient/Family education to increase follow through with safety techniques and functional independence  * Therapeutic exercise to improve motor endurance, ROM, and functional strength for ADLs/functional transfers  * Therapeutic activities to facilitate/challenge dynamic balance, stand tolerance for increased safety and independence with ADLs  * Neuro-muscular re-education: facilitation of righting/equilibrium reactions,     Modified [place] Scale   Score     Description  0             No symptoms  1             No significant disability despite symptoms  2             Slight disability; able to look after own affairs  3             Moderate disability; able to ambulate without assist/ requires assist with ADLs  4             Moderate/Severe disability;requires assist to ambulate/assist with ADLs  5             Severe disability;bedridden/incontinent   6               Score:   3-4    Recommended Adaptive Equipment:  TBD     Home Living:  Pt lives with wife in a 1 story with 3 step(s) to enter and 1 rail(s); bed/bath on 1st   Bathroom setup: tub shower   Equipment owned: ww    Prior Level of Function: Independent  with ADLs , Independent  with IADLs; ambulated no AD   Driving: no  Occupation/leisure: retired hdz     Pain Level: none    Cognition: A&O: 4/4;   Follows multi step directions: good    Memory:  fair    Sequencing:  fair    Problem solving:  fair    Judgement/safety:  fair     Functional Assessment:   AM-PAC Daily Activity Raw Score:      Initial Eval Status  Date: 5/15/22 Treatment Status  Date: STG=LTG  Time frame: 10-14 days   Feeding Supervision   Some spillage of food noted on gown  Independent    Grooming Stand by Assist   Independent    UB Dressing Minimal Assist   Independent    LB Dressing Minimal Assist   Independent    Bathing Minimal Assist  Independent    Toileting Minimal Assist   Independent    Bed Mobility  Supine to sit: Stand by Assist   Sit to supine: Stand by Assist   Supine to sit:  Independent   Sit to supine: Independent    Functional Transfers Sit to stand: Stand by Assist   Stand to sit: Stand by Assist   Stand pivot: Stand by Assist   Independent    Functional Mobility Min A with no AD  Mildly unsteady with occasional lateral sway ,   Pt may benefit from a walker for safety  indep   Balance Sitting: SBA,      Standing: min A  Sitting: indep      Standing: indep   Activity Tolerance fair  good   Visual/  Perceptual Glasses: no  Perception: appears WFL          BUE  ROM/Strength/  Fine motor Coordination Hand dominance: R    RUE: ROM WFL     Strength: grossly 4/5      Strength:  WFL     Coordination:   WFL    LUE: ROM  WFL     Strength: grossly 4/5      Strength:  WFL     Coordination: WFL       Hearing: WFL   Sensation:  No c/o numbness or tingling   Tone:  WFL   Edema:  None noted    Comments:   RN cleared patient for OT. Upon arrival, patient in bed finishing up his breakfast. Tray was sitting on his lap instead of tray table. External catheter was off and on the floor. .  Therapist facilitated and instructed pt on adapted  techniques & compensatory strategies to improve safety and independence with basic ADLs (while seated on the EOB/standing) functional transfers & functional mobility  to allow pt to achieve highest level of independence and safely. Patient presents with mild unsteady dynamic standing balance, slighly  decreased  ADLs,  bed mobility,  functional transfers, and functional mobility . At end of session, patient in bed with call light and phone within reach, all lines and tubes intact. Pt would benefit from continued skilled OT to increase safety and independence with completion of ADL tasks and functional mobility for improved quality of life. Treatment: OT treatment provided this date includes:    ADL-  Instruction/training on safety and adapted techniques for completion of bathing, dressing    Mobility-  Instruction/training on safety and improved independence with bed mobility , functional transfers , and functional mobility .  Sitting EOB x 20 minutes to improve dynamic sitting balance and activity tolerance during ADLs.     Rehab Potential: Good  for established goals     Patient / Family Goal:  Not stated    Patient and/or family were instructed on functional diagnosis, prognosis/goals and OT plan of care. Demonstrated good- understanding. Eval Complexity: Low    Time In: 9:25  Time Out: 9:55  Total Treatment Time: 15 minutes    Min Units   OT Eval Low 02498  x     OT Eval Medium 70971      OT Eval High 97659       OT Re-Eval [id number]       Therapeutic Ex 72128       Therapeutic Activities 73285      ADL/Self Care 87552  15 1   Orthotic Management 05613       Neuro Re-Ed 63358       Non-Billable Time          Evaluation Time includes thorough review of current medical information, gathering information on past medical history/social history and prior level of function, completion of standardized testing/informal observation of tasks, assessment of data and education on plan of care and goals.             [place], [place] #[id number]

## 2022-05-16 LAB
ANION GAP SERPL CALCULATED.3IONS-SCNC: 10 MMOL/L (ref 7–16)
BASOPHILS ABSOLUTE: 0.04 E9/L (ref 0–0.2)
BASOPHILS RELATIVE PERCENT: 0.7 % (ref 0–2)
BUN BLDV-MCNC: 18 MG/DL (ref 6–23)
CALCIUM SERPL-MCNC: 8.7 MG/DL (ref 8.6–10.2)
CHLORIDE BLD-SCNC: 107 MMOL/L (ref 98–107)
CO2: 24 MMOL/L (ref 22–29)
CREAT SERPL-MCNC: 1.2 MG/DL (ref 0.7–1.2)
EOSINOPHILS ABSOLUTE: 0.27 E9/L (ref 0.05–0.5)
EOSINOPHILS RELATIVE PERCENT: 4.9 % (ref 0–6)
GFR AFRICAN AMERICAN: >60
GFR NON-AFRICAN AMERICAN: 58 ML/MIN/1.73
GLUCOSE BLD-MCNC: 95 MG/DL (ref 74–99)
HCT VFR BLD CALC: 42.2 % (ref 37–54)
HEMOGLOBIN: 13.4 G/DL (ref 12.5–16.5)
IMMATURE GRANULOCYTES #: 0.02 E9/L
IMMATURE GRANULOCYTES %: 0.4 % (ref 0–5)
LV EF: 63 %
LVEF MODALITY: NORMAL
LYMPHOCYTES ABSOLUTE: 1.3 E9/L (ref 1.5–4)
LYMPHOCYTES RELATIVE PERCENT: 23.7 % (ref 20–42)
MAGNESIUM: 2.1 MG/DL (ref 1.6–2.6)
MCH RBC QN AUTO: 27.6 PG (ref 26–35)
MCHC RBC AUTO-ENTMCNC: 31.8 % (ref 32–34.5)
MCV RBC AUTO: 87 FL (ref 80–99.9)
MONOCYTES ABSOLUTE: 0.64 E9/L (ref 0.1–0.95)
MONOCYTES RELATIVE PERCENT: 11.7 % (ref 2–12)
NEUTROPHILS ABSOLUTE: 3.22 E9/L (ref 1.8–7.3)
NEUTROPHILS RELATIVE PERCENT: 58.6 % (ref 43–80)
PDW BLD-RTO: 13.8 FL (ref 11.5–15)
PHOSPHORUS: 3.5 MG/DL (ref 2.5–4.5)
PLATELET # BLD: 232 E9/L (ref 130–450)
PMV BLD AUTO: 9.2 FL (ref 7–12)
POTASSIUM REFLEX MAGNESIUM: 3.8 MMOL/L (ref 3.5–5)
RBC # BLD: 4.85 E12/L (ref 3.8–5.8)
SODIUM BLD-SCNC: 141 MMOL/L (ref 132–146)
WBC # BLD: 5.5 E9/L (ref 4.5–11.5)

## 2022-05-16 PROCEDURE — 83735 ASSAY OF MAGNESIUM: CPT

## 2022-05-16 PROCEDURE — 85025 COMPLETE CBC W/AUTO DIFF WBC: CPT

## 2022-05-16 PROCEDURE — 6370000000 HC RX 637 (ALT 250 FOR IP): Performed by: CLINICAL NURSE SPECIALIST

## 2022-05-16 PROCEDURE — 36415 COLL VENOUS BLD VENIPUNCTURE: CPT

## 2022-05-16 PROCEDURE — 80048 BASIC METABOLIC PNL TOTAL CA: CPT

## 2022-05-16 PROCEDURE — 2060000000 HC ICU INTERMEDIATE R&B

## 2022-05-16 PROCEDURE — 2580000003 HC RX 258: Performed by: EMERGENCY MEDICINE

## 2022-05-16 PROCEDURE — 93307 TTE W/O DOPPLER COMPLETE: CPT

## 2022-05-16 PROCEDURE — 84100 ASSAY OF PHOSPHORUS: CPT

## 2022-05-16 RX ADMIN — SODIUM CHLORIDE, PRESERVATIVE FREE 10 ML: 5 INJECTION INTRAVENOUS at 08:25

## 2022-05-16 RX ADMIN — CLOPIDOGREL BISULFATE 75 MG: 75 TABLET ORAL at 08:32

## 2022-05-16 RX ADMIN — SODIUM CHLORIDE, PRESERVATIVE FREE 10 ML: 5 INJECTION INTRAVENOUS at 20:55

## 2022-05-16 RX ADMIN — ATORVASTATIN CALCIUM 40 MG: 40 TABLET, FILM COATED ORAL at 20:55

## 2022-05-16 RX ADMIN — ASPIRIN 81 MG 81 MG: 81 TABLET ORAL at 08:31

## 2022-05-16 ASSESSMENT — PAIN SCALES - GENERAL
PAINLEVEL_OUTOF10: 0
PAINLEVEL_OUTOF10: 0

## 2022-05-16 NOTE — PROGRESS NOTES
Patient seen and examined earlier today  Ongoing improvement  Afebrile , vs stable  RRR  Lungs clear  Neuro stable

## 2022-05-17 VITALS
HEART RATE: 89 BPM | DIASTOLIC BLOOD PRESSURE: 63 MMHG | OXYGEN SATURATION: 96 % | TEMPERATURE: 98.3 F | BODY MASS INDEX: 21.53 KG/M2 | SYSTOLIC BLOOD PRESSURE: 91 MMHG | RESPIRATION RATE: 15 BRPM | HEIGHT: 67 IN | WEIGHT: 137.2 LBS

## 2022-05-17 LAB
MAGNESIUM: 1.9 MG/DL (ref 1.6–2.6)
PHOSPHORUS: 2.9 MG/DL (ref 2.5–4.5)

## 2022-05-17 PROCEDURE — 84100 ASSAY OF PHOSPHORUS: CPT

## 2022-05-17 PROCEDURE — 97530 THERAPEUTIC ACTIVITIES: CPT

## 2022-05-17 PROCEDURE — 97161 PT EVAL LOW COMPLEX 20 MIN: CPT

## 2022-05-17 PROCEDURE — 36415 COLL VENOUS BLD VENIPUNCTURE: CPT

## 2022-05-17 PROCEDURE — 6370000000 HC RX 637 (ALT 250 FOR IP): Performed by: CLINICAL NURSE SPECIALIST

## 2022-05-17 PROCEDURE — 2580000003 HC RX 258: Performed by: EMERGENCY MEDICINE

## 2022-05-17 PROCEDURE — 93246 EXT ECG>7D<15D RECORDING: CPT

## 2022-05-17 PROCEDURE — 97535 SELF CARE MNGMENT TRAINING: CPT

## 2022-05-17 PROCEDURE — 83735 ASSAY OF MAGNESIUM: CPT

## 2022-05-17 RX ORDER — ASPIRIN 81 MG/1
81 TABLET, CHEWABLE ORAL DAILY
Qty: 30 TABLET | Refills: 3 | Status: SHIPPED | OUTPATIENT
Start: 2022-05-18 | End: 2022-06-17

## 2022-05-17 RX ORDER — CLOPIDOGREL BISULFATE 75 MG/1
75 TABLET ORAL DAILY
Qty: 30 TABLET | Refills: 1 | Status: SHIPPED | OUTPATIENT
Start: 2022-05-18 | End: 2022-06-17

## 2022-05-17 RX ORDER — ATORVASTATIN CALCIUM 40 MG/1
40 TABLET, FILM COATED ORAL NIGHTLY
Qty: 30 TABLET | Refills: 3 | Status: SHIPPED | OUTPATIENT
Start: 2022-05-17 | End: 2022-06-16

## 2022-05-17 RX ADMIN — ASPIRIN 81 MG 81 MG: 81 TABLET ORAL at 08:46

## 2022-05-17 RX ADMIN — CLOPIDOGREL BISULFATE 75 MG: 75 TABLET ORAL at 08:46

## 2022-05-17 RX ADMIN — SODIUM CHLORIDE, PRESERVATIVE FREE 9 ML: 5 INJECTION INTRAVENOUS at 08:46

## 2022-05-17 ASSESSMENT — PAIN SCALES - GENERAL: PAINLEVEL_OUTOF10: 0

## 2022-05-17 NOTE — PROGRESS NOTES
Occupational Therapy  OT BEDSIDE TREATMENT NOTE   [address] [address] [facility]  [address] [place] [address]       Date:2022  Patient Name: Meg Monae  MRN: [id number]  : 1940  Room: [facility]-     Per OT Eval:    Evaluating OT: Greg De La Garza, OTR/L #[id number]     Referring Provider[de-identified] OSBALDO Hanley - CLARI                                     Specific Provider Orders/Date: OT evaluation and treatment 22     Diagnosis:  Acute CVA (cerebrovascular accident) [facility]) [I63.9]  Cerebrovascular accident (CVA) due to occlusion of left anterior cerebral artery ([facility] [address] [I63.522]       Pertinent Medical History:  has a past medical history of Cancer ([facility] [address] Dehydration, Hypertension, Inguinal hernia, Mediastinal adenopathy, Peripheral vascular disease ([facility] [address] and Thyroid disease.         Precautions:  Fall Risk,      Assessment of current deficits   [x]? Functional mobility             [x]?ADLs           [x]? Strength                  []?Cognition   [x]? Functional transfers           []? IADLs         [x]? Safety Awareness   [x]? Endurance   []? Fine Coordination              [x]? Balance      []? Vision/perception   []? Sensation     []? Gross Motor Coordination  []? ROM           []?  Delirium                   []? Motor Control      OT PLAN OF CARE   OT POC based on physician orders, patient diagnosis and results of clinical assessment     Frequency/Duration 1-4 days/wk for 2 weeks PRN   Specific OT Treatment to include:   * Instruction/training on adapted ADL techniques and AE recommendations to increase functional independence within precautions       * Functional transfer/mobility training/DME recommendations for increased independence, safety, and fall prevention  * Patient/Family education to increase follow through with safety techniques and functional independence  * Therapeutic exercise to improve motor endurance, ROM, and functional strength for ADLs/functional transfers  * Therapeutic activities to facilitate/challenge dynamic balance, stand tolerance for increased safety and independence with ADLs  * Neuro-muscular re-education: facilitation of righting/equilibrium reactions,      Modified [place] Scale   Score     Description  0             No symptoms  1             No significant disability despite symptoms  2             Slight disability; able to look after own affairs  3             Moderate disability; able to ambulate without assist/ requires assist with ADLs  4             Moderate/Severe disability;requires assist to ambulate/assist with ADLs  5             Severe disability;bedridden/incontinent   6               Score:   3-4     Recommended Adaptive Equipment:  TBD      Home Living:  Pt lives with wife in a 1 story with 3 step(s) to enter and 1 rail(s); bed/bath on 1st   Bathroom setup: tub shower   Equipment owned: ww     Prior Level of Function: Independent  with ADLs , Independent  with IADLs; ambulated no AD   Driving: no  Occupation/leisure: retired hdz      Pain Level: none     Cognition: A&O: 4/4; pleasant & cooperative   Follows multi step directions: good               Memory:  fair               Sequencing:  fair               Problem solving:  fair               Judgement/safety:  fair      Functional Assessment:   AM-PAC Daily Activity Raw Score:        Initial Eval Status  Date: 5/15/22 Treatment Status  Date:  22 STG=LTG  Time frame: 10-14 days   Feeding Supervision   Some spillage of food noted on gown Supervision   Seated in chair  Independent    Grooming Stand by Assist  CGA  Standing at sink to wash hands    Independent    UB Dressing Minimal Assist   Min A  Luis/doff gown seated  Independent    LB Dressing Minimal Assist   Min A  Assist to thread legs into brief & pull over hips    Independent    Bathing Minimal Assist Min A  Completed sponge bathing Independent    Toileting Minimal Assist   Max A  Incontinent of BM, assist for hygiene  Independent    Bed Mobility  Supine to sit: Stand by Assist   Sit to supine: Stand by Assist  NT    Supine to sit: Independent   Sit to supine: Independent    Functional Transfers Sit to stand: Stand by Assist   Stand to sit: Stand by Assist   Stand pivot: Stand by Assist   CGA  Cues for hand placement & technique  Independent    Functional Mobility Min A with no AD  Mildly unsteady with occasional lateral sway ,   Pt may benefit from a walker for safety CGA  With walker   indep   Balance Sitting: SBA,      Standing: min A  Sitting: SBA  Standing: CGA  With walker Sitting: indep      Standing: indep   Activity Tolerance fair Fair  good   Visual/  Perceptual Glasses: no  Perception: appears WFL             BUE  ROM/Strength/  Fine motor Coordination Hand dominance: R     RUE: ROM WFL     Strength: grossly 4/5      Strength:  WFL     Coordination:   WFL     LUE: ROM  WFL     Strength: grossly 4/5      Strength:  WFL     Coordination: WFL         Education:  Pt was educated through out treatment regarding proper technique & safety with functional transfers & mobility, walker management & ADL compensatory strategies to ease tasks, improve safety & prevent falls to return home safely. Comments: Upon arrival pt was seated in bathroom with PT present. Pt was incontinent of BM walking to bathroom per PT. At end of session pt walking with PT, all lines and tubes intact, call light within reach. · Pt has made Fair progress towards set goals. · Continue with current plan of care      Treatment Time In: 9:20           Treatment Time Out: 9:50          Treatment Charges: Mins Units   Ther Ex  31723     Manual Therapy [address] [facility]     Thera Activities 00019 10 1   ADL/Home Mgt 87074 20 1   Neuro Re-ed 85733     Group Therapy      Orthotic manage/training  02089     Non-Billable Time     Total Timed Treatment 30 2       Pia LARIOS  [address] [place] [address] Drive, [address] [place] [address]

## 2022-05-17 NOTE — CARE COORDINATION
Requested therapy to see patient this am.  PT worked with him. Patient OK to transition to home with outpatient therapy at discharge. Per PT, patient has a FWW at home for use, but he does not use it. AVS updated with recommendations for outpatient therapy and to use Walker at discharge. Patient will need Zio patch at discharge and nursing notified in [facility] and will place prior to discharge. Anticipate discharge to home later this afternoon. Will continue to follow.      Edith Kebede RN.  Michelle Lopez  [phone]

## 2022-05-17 NOTE — PROGRESS NOTES
Physical Therapy Initial Assessment     Name: Adriano Box  : 1940  MRN: [id number]      Date of Service: 2022    Evaluating PT:  Latasha Armas PT, DPT [id number]    Room #:  4950/3329-U  Diagnosis:  Acute CVA (cerebrovascular accident) [facility]) [I63.9]  Cerebrovascular accident (CVA) due to occlusion of left anterior cerebral artery ([facility] [address] [I63.522]  PMHx/PSHx:    Past Medical History:   Diagnosis Date    Cancer [facility])     prostate    Dehydration 11/10/2019    Hypertension     Inguinal hernia     Mediastinal adenopathy 10/2017    Peripheral vascular disease ([facility] [address]     Thyroid disease      Procedure/Surgery:  None this admission   Reason for admission: Acute CVA  Precautions:  Fall risk  Equipment Needs:  None at this time    SUBJECTIVE:  Pt lives single story home with wife and son who are able to assist him with 2 DONI Pt notes 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    OBJECTIVE:   Initial Evaluation  Date: 22 Treatment Short Term/ Long Term   Goals   AM-PAC 6 Clicks      Was pt agreeable to Eval/treatment?  Yes     Does pt have pain? no     Bed Mobility  Rolling: ind  Supine to sit: SBA  Sit to supine: SBA  Scooting: SBA  Rolling: ind  Supine to sit: ind  Sit to supine: ind  Scooting: ind   Transfers Sit to stand: SBA  Stand to sit: SBA  Stand pivot: NT  Sit to stand: ind  Stand to sit: ind  Stand pivot: ind   Ambulation    150 feet with no AD SBA<> min A  200 feet with no AD ind   Stair negotiation: ascended and descended NT  2 steps with hand rail ind   ROM BUE:  Refer to OT eval   BLE:  WNL     Strength BUE:  Refer to OT eval   BLE:  WNL     Balance Sitting EOB:  SBA  Dynamic Standing:  SBA <>min a no AD  Sitting EOB:  ind  Dynamic Standing:  ind     Pt is A & O x 4  Sensation:  Pt denies numbness and tingling to extremities  Edema:  None noted     Vitals:  Blood Pressure at rest -- Blood Pressure post session ----   Heart Rate at rest -- Heart Rate post session -- SPO2 at rest -- SPO2 post session --     Therapeutic Exercises:  none performed this visit    Patient education  Pt educated on use of FWW to improve safety at home, outpatient/home care PT options, role of PT in hospital     Patient response to education:   Pt verbalized understanding Pt demonstrated skill Pt requires further education in this area   x x      ASSESSMENT:  Conditions Requiring Skilled Therapeutic Intervention:  [x]Decreased strength     []Decreased ROM  [x]Decreased functional mobility  [x]Decreased balance   [x]Decreased endurance   []Decreased posture  []Decreased sensation  [x]Decreased coordination   []Decreased vision  [x]Decreased safety awareness   []Increased pain     Comments:  Pt in bed on arrival, agreeable to PT evaluation. Pt noted to be incontinent of BM upon transferring to EOB, requested to use restroom. Pt cont to be incontinent of BM on way to restroom, required assistance for cleaning. Extra time required for pt to use restroom and for pericare to be performed. Pt notes he typically wears depends at home so this does not occur. Pt able to stand without UE support for cleaning, requested to perform ADLs in standing and had good dynamic balance. Pt required no seated rest before ambulating in hallway, instructed to ambulate to tolerance. Pt ambulated with no AD with fluctuating assistance required - pt had few minor LOB requiring min A but overall had good dynamic balance, ambulating with slightly wider than normal NICOLASA. Pt also utilized hand rail on wall at times to improve stability. Pt advised to use walker at home if feeling unsteady. Pt requested to sit in chair after evaluation today and was left with all needs met, RN aware. Pt also educated about further PT after DC in order to improve remaining balance deficits.     Treatment:  Patient practiced and was instructed in the following treatment:     Bed mobility: performed with cues for safety awareness and proper hand placement to promote improved functional independence.  Transfer Training: STS performed from EOB and from toilet height   Gait training: performed in hallway/room to assess dynamic balance, endurance, strength      Pt's/ family goals   1. Return home safely. Prognosis is good for reaching above PT goals. Patient and or family understand(s) diagnosis, prognosis, and plan of care. yes    PHYSICAL THERAPY PLAN OF CARE:    PT POC is established based on physician order and patient diagnosis     Referring provider/PT Order:      PT eval and treat  Start:  05/13/22 1545,   End:  05/13/22 1545,   ONE TIME,   Standing Count:  1 Occurrences,   R         Yesenia Huerta, APRN - CNP      Diagnosis:  Acute CVA (cerebrovascular accident) [facility]) [I63.9]  Cerebrovascular accident (CVA) due to occlusion of left anterior cerebral artery ([facility] [address] [I63.522]  Specific instructions for next treatment:  Increase ambulation distance     Current Treatment Recommendations:   [x] Strengthening to improve independence with functional mobility   [] ROM to improve independence with functional mobility   [x] Balance Training to improve static/dynamic balance and to reduce fall risk  [x] Endurance Training to improve activity tolerance during functional mobility   [] Transfer Training to improve safety and independence with all functional transfers   [x] Gait Training to improve gait mechanics, endurance and assess need for appropriate assistive device  [] Stair Training in preparation for safe discharge home and/or into the community   [] Positioning to prevent skin breakdown and contractures  [] Safety and Education Training   [] Patient/Caregiver Education   [] HEP  [] Other     PT long term treatment goals are located in above grid    Frequency of treatments: 2-5x/week x 1-2 weeks.     Time in  0912  Time out  0956    Total Treatment Time  25 minutes     Evaluation Time includes thorough review of current medical information, gathering information on past medical history/social history and prior level of function, completion of standardized testing/informal observation of tasks, assessment of data and education on plan of care and goals.     CPT codes:  [x] Low Complexity PT evaluation 56630  [] Moderate Complexity PT evaluation 06159  [] High Complexity PT evaluation 61183  [] PT Re-evaluation 75361  [] Gait training 60948 -- minutes  [] Manual therapy [address] [facility] -- minutes  [x] Therapeutic activities 99524 25 minutes  [] Therapeutic exercises 45666 -- minutes  [] Neuromuscular reeducation 28700 -- minutes     Rahul Crespo, PT, DPT  [id number]

## 2022-05-17 NOTE — CARE COORDINATION
Notified by therapy patient's daughter asking to speak with CM re: transition of care planning after she spoke with her. Call placed to Vaishnavi Zelaya and detailed VM left. Notified by nursing that the patient's wife Donell Nguyen is asking to speak with this CM. Met with the patient's wife in the abernathy and explained [facility] vs [address] vs outpatient therapy. Reviewed therapy input and why patient is appropriate for outpatient therapy vs [facility]. Donell Nguyen expressed understanding. Discussed options for possible outpatient rehab including [address] She will review both options and see who can accept. Also stressed the importance of the patient using his walker at all times while ambulating until he is more stable after therapy. Donlel Nguyen expressed understanding and is comfortable with the discharge plan. Bedside nurse [facility] notified.      Ronni Yost RN.  [place]  [phone]

## 2022-05-17 NOTE — PROGRESS NOTES
Continues to improve  Afebrile , vs stable  RRR  Lungs clear  Neuro stable  Discharge to home today  For outpatient PT

## 2022-05-18 ENCOUNTER — HOSPITAL ENCOUNTER (OUTPATIENT)
Age: 82
Setting detail: OBSERVATION
Discharge: HOME | End: 2022-05-20
Attending: EMERGENCY MEDICINE | Admitting: INTERNAL MEDICINE
Payer: OTHER GOVERNMENT

## 2022-05-18 ENCOUNTER — APPOINTMENT (OUTPATIENT)
Dept: CT IMAGING | Age: 82
End: 2022-05-18
Payer: OTHER GOVERNMENT

## 2022-05-18 ENCOUNTER — APPOINTMENT (OUTPATIENT)
Dept: GENERAL RADIOLOGY | Age: 82
End: 2022-05-18
Payer: OTHER GOVERNMENT

## 2022-05-18 DIAGNOSIS — G93.41 ACUTE METABOLIC ENCEPHALOPATHY: ICD-10-CM

## 2022-05-18 DIAGNOSIS — R65.20 SEPSIS WITH ENCEPHALOPATHY WITHOUT SEPTIC SHOCK, DUE TO UNSPECIFIED ORGANISM (HCC): Primary | ICD-10-CM

## 2022-05-18 DIAGNOSIS — G93.40 SEPSIS WITH ENCEPHALOPATHY WITHOUT SEPTIC SHOCK, DUE TO UNSPECIFIED ORGANISM (HCC): Primary | ICD-10-CM
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
## 2022-05-18 DIAGNOSIS — A41.9 SEPSIS WITH ENCEPHALOPATHY WITHOUT SEPTIC SHOCK, DUE TO UNSPECIFIED ORGANISM (HCC): Primary | ICD-10-CM

## 2022-05-18 DIAGNOSIS — J18.9 HOSPITAL-ACQUIRED PNEUMONIA: ICD-10-CM

## 2022-05-18 LAB
ALBUMIN SERPL-MCNC: 3.6 G/DL (ref 3.5–5.2)
ALP BLD-CCNC: 91 U/L (ref 40–129)
ALT SERPL-CCNC: 10 U/L (ref 0–40)
AMORPHOUS: ABNORMAL
ANION GAP SERPL CALCULATED.3IONS-SCNC: 10 MMOL/L (ref 7–16)
AST SERPL-CCNC: 14 U/L (ref 0–39)
BACTERIA: ABNORMAL /HPF
BASOPHILS ABSOLUTE: 0.03 E9/L (ref 0–0.2)
BASOPHILS RELATIVE PERCENT: 0.2 % (ref 0–2)
BILIRUB SERPL-MCNC: 0.3 MG/DL (ref 0–1.2)
BILIRUBIN URINE: NEGATIVE
BLOOD, URINE: ABNORMAL
BUN BLDV-MCNC: 25 MG/DL (ref 6–23)
CALCIUM SERPL-MCNC: 8.8 MG/DL (ref 8.6–10.2)
CHLORIDE BLD-SCNC: 101 MMOL/L (ref 98–107)
CLARITY: CLEAR
CO2: 24 MMOL/L (ref 22–29)
COLOR: YELLOW
CREAT SERPL-MCNC: 1.3 MG/DL (ref 0.7–1.2)
EOSINOPHILS ABSOLUTE: 0.04 E9/L (ref 0.05–0.5)
EOSINOPHILS RELATIVE PERCENT: 0.3 % (ref 0–6)
GFR AFRICAN AMERICAN: >60
GFR NON-AFRICAN AMERICAN: 53 ML/MIN/1.73
GLUCOSE BLD-MCNC: 131 MG/DL (ref 74–99)
GLUCOSE URINE: NEGATIVE MG/DL
HCT VFR BLD CALC: 37.5 % (ref 37–54)
HEMOGLOBIN: 12.4 G/DL (ref 12.5–16.5)
IMMATURE GRANULOCYTES #: 0.06 E9/L
IMMATURE GRANULOCYTES %: 0.5 % (ref 0–5)
KETONES, URINE: ABNORMAL MG/DL
LACTIC ACID, SEPSIS: 0.8 MMOL/L (ref 0.5–1.9)
LACTIC ACID, SEPSIS: 0.9 MMOL/L (ref 0.5–1.9)
LEUKOCYTE ESTERASE, URINE: NEGATIVE
LYMPHOCYTES ABSOLUTE: 0.62 E9/L (ref 1.5–4)
LYMPHOCYTES RELATIVE PERCENT: 4.7 % (ref 20–42)
MAGNESIUM: 1.8 MG/DL (ref 1.6–2.6)
MCH RBC QN AUTO: 27.9 PG (ref 26–35)
MCHC RBC AUTO-ENTMCNC: 33.1 % (ref 32–34.5)
MCV RBC AUTO: 84.3 FL (ref 80–99.9)
MONOCYTES ABSOLUTE: 0.72 E9/L (ref 0.1–0.95)
MONOCYTES RELATIVE PERCENT: 5.4 % (ref 2–12)
NEUTROPHILS ABSOLUTE: 11.85 E9/L (ref 1.8–7.3)
NEUTROPHILS RELATIVE PERCENT: 88.9 % (ref 43–80)
NITRITE, URINE: NEGATIVE
PDW BLD-RTO: 13.5 FL (ref 11.5–15)
PH UA: 6.5 (ref 5–9)
PLATELET # BLD: 302 E9/L (ref 130–450)
PMV BLD AUTO: 8.8 FL (ref 7–12)
POTASSIUM SERPL-SCNC: 3.9 MMOL/L (ref 3.5–5)
PRO-BNP: 470 PG/ML (ref 0–450)
PROTEIN UA: ABNORMAL MG/DL
RBC # BLD: 4.45 E12/L (ref 3.8–5.8)
RBC UA: ABNORMAL /HPF (ref 0–2)
SARS-COV-2, NAAT: NOT DETECTED
SODIUM BLD-SCNC: 135 MMOL/L (ref 132–146)
SPECIFIC GRAVITY UA: 1.02 (ref 1–1.03)
TOTAL PROTEIN: 6.1 G/DL (ref 6.4–8.3)
TROPONIN, HIGH SENSITIVITY: 22 NG/L (ref 0–11)
UROBILINOGEN, URINE: 0.2 E.U./DL
WBC # BLD: 13.3 E9/L (ref 4.5–11.5)
WBC UA: ABNORMAL /HPF (ref 0–5)

## 2022-05-18 PROCEDURE — 87040 BLOOD CULTURE FOR BACTERIA: CPT

## 2022-05-18 PROCEDURE — 2140000000 HC CCU INTERMEDIATE R&B

## 2022-05-18 PROCEDURE — 96361 HYDRATE IV INFUSION ADD-ON: CPT

## 2022-05-18 PROCEDURE — 96365 THER/PROPH/DIAG IV INF INIT: CPT

## 2022-05-18 PROCEDURE — 96360 HYDRATION IV INFUSION INIT: CPT

## 2022-05-18 PROCEDURE — 2580000003 HC RX 258: Performed by: EMERGENCY MEDICINE

## 2022-05-18 PROCEDURE — 81001 URINALYSIS AUTO W/SCOPE: CPT

## 2022-05-18 PROCEDURE — 84484 ASSAY OF TROPONIN QUANT: CPT

## 2022-05-18 PROCEDURE — G0378 HOSPITAL OBSERVATION PER HR: HCPCS

## 2022-05-18 PROCEDURE — 70450 CT HEAD/BRAIN W/O DYE: CPT

## 2022-05-18 PROCEDURE — 87635 SARS-COV-2 COVID-19 AMP PRB: CPT

## 2022-05-18 PROCEDURE — 83735 ASSAY OF MAGNESIUM: CPT

## 2022-05-18 PROCEDURE — 85025 COMPLETE CBC W/AUTO DIFF WBC: CPT

## 2022-05-18 PROCEDURE — 6370000000 HC RX 637 (ALT 250 FOR IP): Performed by: EMERGENCY MEDICINE

## 2022-05-18 PROCEDURE — 71046 X-RAY EXAM CHEST 2 VIEWS: CPT

## 2022-05-18 PROCEDURE — 80053 COMPREHEN METABOLIC PANEL: CPT

## 2022-05-18 PROCEDURE — 93005 ELECTROCARDIOGRAM TRACING: CPT | Performed by: EMERGENCY MEDICINE

## 2022-05-18 PROCEDURE — 99285 EMERGENCY DEPT VISIT HI MDM: CPT

## 2022-05-18 PROCEDURE — 83880 ASSAY OF NATRIURETIC PEPTIDE: CPT

## 2022-05-18 PROCEDURE — 83605 ASSAY OF LACTIC ACID: CPT

## 2022-05-18 RX ORDER — ACETAMINOPHEN 325 MG/1
650 TABLET ORAL ONCE
Status: COMPLETED | OUTPATIENT
Start: 2022-05-18 | End: 2022-05-18

## 2022-05-18 RX ORDER — 0.9 % SODIUM CHLORIDE 0.9 %
1000 INTRAVENOUS SOLUTION INTRAVENOUS ONCE
Status: COMPLETED | OUTPATIENT
Start: 2022-05-18 | End: 2022-05-19

## 2022-05-18 RX ADMIN — ACETAMINOPHEN 650 MG: 325 TABLET ORAL at 22:04

## 2022-05-18 RX ADMIN — SODIUM CHLORIDE 1000 ML: 9 INJECTION, SOLUTION INTRAVENOUS at 19:01

## 2022-05-18 ASSESSMENT — ENCOUNTER SYMPTOMS
SHORTNESS OF BREATH: 0
ABDOMINAL PAIN: 0
RHINORRHEA: 0
NAUSEA: 0
BLOOD IN STOOL: 0
DIARRHEA: 0
TROUBLE SWALLOWING: 0
VOMITING: 0
COLOR CHANGE: 0
COUGH: 1

## 2022-05-18 ASSESSMENT — PAIN - FUNCTIONAL ASSESSMENT: PAIN_FUNCTIONAL_ASSESSMENT: NONE - DENIES PAIN

## 2022-05-18 NOTE — ED PROVIDER NOTES
ED PROVIDER NOTE    Chief Complaint   Patient presents with    Altered Mental Status     pt discharged yesterday for CVA. pt now having tremors. delayed responses noticed. unsure of defecits from stroke       HPI:  5/18/22,   Time: 6:45 PM EDT      History limited from patient, mostly provided by wife at bedside. Rosio Ascencio is a [de-identified] y.o. male presenting to the ED for shaking and tremors. Gradual onset this morning, progressively worsening, moderate in severity. Wife reports that this morning he started to have intermittent tremors which have worsened throughout the day. He is alert and responsive during these episodes. He also has been more slow to respond to questions. Associated chills. Has a cough for quite some time, unchanged. No fever, chest pain, shortness of breath, abdominal pain, nausea, vomiting, diarrhea. Normal po intake and urine output. Was discharged yesterday after admission on 5/12/22 for CVA 2/2 left JOSELYN occlusion s/p TPA. 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 Negative for appetite change and fever. HENT: Negative for congestion, rhinorrhea and trouble swallowing. Eyes: Negative for visual disturbance. Respiratory: Positive for cough. Negative for shortness of breath. Cardiovascular: Negative for chest pain and leg swelling. Gastrointestinal: Negative for abdominal pain, blood in stool, diarrhea, nausea and vomiting. Genitourinary: Negative for decreased urine volume, difficulty urinating, dysuria, frequency, hematuria and urgency. Musculoskeletal: Negative for myalgias, neck pain and neck stiffness. Skin: Negative for color change. Neurological: Positive for tremors.  Negative for dizziness, syncope, weakness, light-headedness, numbness and headaches.         --------------------------------------------- PAST HISTORY ---------------------------------------------  Past Medical History:   Past Medical History:   Diagnosis Date    Cancer ([facility] 2014    prostate    Dehydration 11/10/2019    Hypertension     Inguinal hernia     Mediastinal adenopathy 10/2017    Peripheral vascular disease ([facility]     Thyroid disease        Past Surgical History:   Past Surgical History:   Procedure Laterality Date    ABDOMEN SURGERY      APPENDECTOMY      BALLOON ANGIOPLASTY, ARTERY  8/6/2014    [facility] 8x10 left common iliac Dr [place] Early    BRONCHOSCOPY  10/27/2017    COLONOSCOPY  20106    HERNIA REPAIR Right     R inguinal    TONSILLECTOMY      VASCULAR SURGERY      stent left leg       Social History:   Social History     Socioeconomic History    Marital status:      Spouse name: None    Number of children: None    Years of education: None    Highest education level: None   Occupational History    Occupation: retired-capnter    Tobacco Use    Smoking status: Current Every Day Smoker     Packs/day: 0.50     Years: 63.00     Pack years: 31.50     Types: Cigarettes     Start date: 6/1/1956    Smokeless tobacco: Current User     Types: Snuff     Last attempt to quit: 11/21/2014   Vaping Use    Vaping Use: Never used   Substance and Sexual Activity    Alcohol use: Yes     Alcohol/week: 14.0 standard drinks     Types: 14 Cans of beer per week     Comment: 2 beer daily    Drug use: Never    Sexual activity: Never   Other Topics Concern    None   Social History Narrative    None     Social Determinants of Health     Financial Resource Strain:     Difficulty of Paying Living Expenses: Not on file   Food Insecurity:     Worried About Running Out of Food in the Last Year: Not on file    Yasmani of Food in the Last Year: Not on file   Transportation Needs:     Lack of Transportation (Medical): Not on file    Lack of Transportation (Non-Medical):  Not on file   Physical Activity:     Days of Exercise per Week: Not on file  Minutes of Exercise per Session: Not on file   Stress:     Feeling of Stress : Not on file   Social Connections:     Frequency of Communication with Friends and Family: Not on file    Frequency of Social Gatherings with Friends and Family: Not on file    Attends Orthodoxy Services: Not on file    Active Member of [address] [place] [address] [employer] or Organizations: Not on file    Attends Club or Organization Meetings: Not on file    Marital Status: Not on file   Intimate Partner Violence:     Fear of Current or Ex-Partner: Not on file    Emotionally Abused: Not on file    Physically Abused: Not on file    Sexually Abused: Not on file   Housing Stability:     Unable to Pay for Housing in the Last Year: Not on file    Number of [place] in the Last Year: Not on file    Unstable Housing in the Last Year: Not on file       Family History:   Family History   Problem Relation Age of Onset    Cancer Brother     Heart Failure Mother     Pacemaker Mother     Stroke Father        The patients home medications have been reviewed. Allergies:   No Known Allergies        ---------------------------------------------------PHYSICAL EXAM--------------------------------------    BP (!) 160/77   Pulse 114   Resp 16   SpO2 96%     Physical Exam  Vitals and nursing note reviewed. Constitutional:       General: He is not in acute distress. Appearance: He is not toxic-appearing. HENT:      Mouth/Throat:      Mouth: Mucous membranes are dry. Eyes:      General: No scleral icterus. Extraocular Movements: Extraocular movements intact. Pupils: Pupils are equal, round, and reactive to light. Cardiovascular:      Rate and Rhythm: Regular rhythm. Tachycardia present. Pulses: Normal pulses. Heart sounds: Normal heart sounds. No murmur heard. Pulmonary:      Effort: Pulmonary effort is normal. No respiratory distress. Breath sounds: Normal breath sounds. No wheezing or rales.    Abdominal:      General: There is no distension. Palpations: Abdomen is soft. Tenderness: There is no abdominal tenderness. There is no guarding or rebound. Musculoskeletal:         General: No swelling or tenderness. Normal range of motion. Cervical back: Normal range of motion and neck supple. No rigidity. No muscular tenderness. Skin:     General: Skin is warm and dry. Neurological:      Mental Status: He is alert and oriented to person, place, and time. Comments: Left facial droop. Non-toxic appearing, afebrile, hemodynamically stable, and in no acute distress. -------------------------------------------------- RESULTS -------------------------------------------------  I have personally reviewed all laboratory and imaging results for this patient. Results are listed below.      LABS:  Labs Reviewed   CBC WITH AUTO DIFFERENTIAL - Abnormal; Notable for the following components:       Result Value    WBC 13.3 (*)     Hemoglobin 12.4 (*)     Neutrophils % 88.9 (*)     Lymphocytes % 4.7 (*)     Neutrophils Absolute 11.85 (*)     Lymphocytes Absolute 0.62 (*)     Eosinophils Absolute 0.04 (*)     All other components within normal limits   COMPREHENSIVE METABOLIC PANEL - Abnormal; Notable for the following components:    Glucose 131 (*)     BUN 25 (*)     CREATININE 1.3 (*)     Total Protein 6.1 (*)     All other components within normal limits   TROPONIN - Abnormal; Notable for the following components:    Troponin, High Sensitivity 22 (*)     All other components within normal limits   BRAIN NATRIURETIC PEPTIDE - Abnormal; Notable for the following components:    Pro- (*)     All other components within normal limits   URINALYSIS WITH MICROSCOPIC - Abnormal; Notable for the following components:    Ketones, Urine TRACE (*)     All other components within normal limits   COVID-19, RAPID   CULTURE, BLOOD 1   CULTURE, BLOOD 2   MAGNESIUM   LACTATE, SEPSIS   LACTATE, SEPSIS RADIOLOGY:  Interpreted personally and by Radiologist.  CT Head WO Contrast   Final Result   No acute intracranial abnormality. XR CHEST (2 VW)   Final Result   Persistent focal density in the left midlung. Follow-up study recommended. Minimal left pleural effusion. EKG:  This EKG is signed and interpreted by the EP. Sinus tachycardia, ventricular rate 109 bpm, normal axis and intervals, no acute injury pattern, no clinically significant change compared w/ prior EKG       ------------------------- NURSING NOTES AND VITALS REVIEWED ---------------------------   The nursing notes within the ED encounter and vital signs as below have been reviewed by myself. BP (!) 160/77   Pulse 114   Resp 16   SpO2 96%   Oxygen Saturation Interpretation: Normal    The patients available past medical records and past encounters were reviewed. ------------------------------ ED COURSE/MEDICAL DECISION MAKING----------------------  Medications   vancomycin (VANCOCIN) 1,250 mg in dextrose 5 % 250 mL IVPB (has no administration in time range)   piperacillin-tazobactam (ZOSYN) 4,500 mg in dextrose 5 % 100 mL IVPB ([id number]) (has no administration in time range)   0.9 % sodium chloride bolus (1,000 mLs IntraVENous New Bag 5/18/22 1901)   acetaminophen (TYLENOL) tablet 650 mg (650 mg Oral Given 5/18/22 2204)       Counseling: The emergency provider has spoken with the patient and discussed todays results, in addition to providing specific details for the plan of care and counseling regarding the diagnosis and prognosis. Questions are answered at this time and they are agreeable with the plan. ED Course/Medical Decision Making: [de-identified] y.o. male here with intermittent episodes of shaking and confusion. In setting of recent admission for acute ischemic stroke status post tPA. Shaking episodes were likely rigors. Nonfocal neurologic exam at this time except for slight left facial droop.   Patient is febrile, tachycardic, hemodynamically stable, in no acute distress. Tachycardia is improving with IV fluids. Work-up notable for left sided pneumonia and new leukocytosis. Started broad-spectrum IV antibiotics for hospital-acquired pneumonia. Admitted in stable condition for further management.       --------------------------------- IMPRESSION AND DISPOSITION ---------------------------------    IMPRESSION  1. Sepsis with encephalopathy without septic shock, due to unspecified organism ([address]    2. Acute metabolic encephalopathy    3. Hospital-acquired pneumonia        DISPOSITION  Disposition: Admit to telemetry  Patient condition is stable    NOTE: This report was transcribed using voice recognition software.  Every effort was made to ensure accuracy; however, inadvertent computerized transcription errors may be present    Rsoa Aviles MD  Attending Emergency Physician         Rosa Aviles MD  05/19/22 6962

## 2022-05-18 NOTE — Clinical Note
Discharge Plan[de-identified] Other/[facility])   Telemetry/Cardiac Monitoring Required?: Yes

## 2022-05-19 LAB
ALBUMIN SERPL-MCNC: 3.2 G/DL (ref 3.5–5.2)
ALP BLD-CCNC: 79 U/L (ref 40–129)
ALT SERPL-CCNC: 7 U/L (ref 0–40)
ANION GAP SERPL CALCULATED.3IONS-SCNC: 9 MMOL/L (ref 7–16)
AST SERPL-CCNC: 11 U/L (ref 0–39)
BILIRUB SERPL-MCNC: 0.9 MG/DL (ref 0–1.2)
BUN BLDV-MCNC: 19 MG/DL (ref 6–23)
CALCIUM SERPL-MCNC: 9 MG/DL (ref 8.6–10.2)
CHLORIDE BLD-SCNC: 103 MMOL/L (ref 98–107)
CO2: 26 MMOL/L (ref 22–29)
CREAT SERPL-MCNC: 1.4 MG/DL (ref 0.7–1.2)
EKG ATRIAL RATE: 109 BPM
EKG P AXIS: 67 DEGREES
EKG P-R INTERVAL: 188 MS
EKG Q-T INTERVAL: 324 MS
EKG QRS DURATION: 88 MS
EKG QTC CALCULATION (BAZETT): 436 MS
EKG R AXIS: 86 DEGREES
EKG T AXIS: 35 DEGREES
EKG VENTRICULAR RATE: 109 BPM
GFR AFRICAN AMERICAN: 59
GFR NON-AFRICAN AMERICAN: 49 ML/MIN/1.73
GLUCOSE BLD-MCNC: 100 MG/DL (ref 74–99)
HCT VFR BLD CALC: 35.8 % (ref 37–54)
HEMOGLOBIN: 11.5 G/DL (ref 12.5–16.5)
MCH RBC QN AUTO: 27.7 PG (ref 26–35)
MCHC RBC AUTO-ENTMCNC: 32.1 % (ref 32–34.5)
MCV RBC AUTO: 86.3 FL (ref 80–99.9)
METER GLUCOSE: 102 MG/DL (ref 74–99)
METER GLUCOSE: 93 MG/DL (ref 74–99)
METER GLUCOSE: 96 MG/DL (ref 74–99)
PDW BLD-RTO: 13.7 FL (ref 11.5–15)
PLATELET # BLD: 286 E9/L (ref 130–450)
PMV BLD AUTO: 9 FL (ref 7–12)
POTASSIUM SERPL-SCNC: 4 MMOL/L (ref 3.5–5)
RBC # BLD: 4.15 E12/L (ref 3.8–5.8)
SODIUM BLD-SCNC: 138 MMOL/L (ref 132–146)
TOTAL PROTEIN: 5.6 G/DL (ref 6.4–8.3)
WBC # BLD: 9.2 E9/L (ref 4.5–11.5)

## 2022-05-19 PROCEDURE — 96367 TX/PROPH/DG ADDL SEQ IV INF: CPT

## 2022-05-19 PROCEDURE — 36415 COLL VENOUS BLD VENIPUNCTURE: CPT

## 2022-05-19 PROCEDURE — 2140000000 HC CCU INTERMEDIATE R&B

## 2022-05-19 PROCEDURE — 6370000000 HC RX 637 (ALT 250 FOR IP): Performed by: INTERNAL MEDICINE

## 2022-05-19 PROCEDURE — 6360000002 HC RX W HCPCS: Performed by: EMERGENCY MEDICINE

## 2022-05-19 PROCEDURE — 2580000003 HC RX 258: Performed by: INTERNAL MEDICINE

## 2022-05-19 PROCEDURE — G0378 HOSPITAL OBSERVATION PER HR: HCPCS

## 2022-05-19 PROCEDURE — 2580000003 HC RX 258: Performed by: EMERGENCY MEDICINE

## 2022-05-19 PROCEDURE — 6360000002 HC RX W HCPCS: Performed by: INTERNAL MEDICINE

## 2022-05-19 PROCEDURE — 97530 THERAPEUTIC ACTIVITIES: CPT

## 2022-05-19 PROCEDURE — 85027 COMPLETE CBC AUTOMATED: CPT

## 2022-05-19 PROCEDURE — 82962 GLUCOSE BLOOD TEST: CPT

## 2022-05-19 PROCEDURE — 80053 COMPREHEN METABOLIC PANEL: CPT

## 2022-05-19 PROCEDURE — 96366 THER/PROPH/DIAG IV INF ADDON: CPT

## 2022-05-19 PROCEDURE — 97165 OT EVAL LOW COMPLEX 30 MIN: CPT

## 2022-05-19 PROCEDURE — 97161 PT EVAL LOW COMPLEX 20 MIN: CPT

## 2022-05-19 PROCEDURE — 97535 SELF CARE MNGMENT TRAINING: CPT

## 2022-05-19 PROCEDURE — 96372 THER/PROPH/DIAG INJ SC/IM: CPT

## 2022-05-19 RX ORDER — SODIUM CHLORIDE 0.9 % (FLUSH) 0.9 %
5-40 SYRINGE (ML) INJECTION PRN
Status: DISCONTINUED | OUTPATIENT
Start: 2022-05-19 | End: 2022-05-20 | Stop reason: HOSPADM

## 2022-05-19 RX ORDER — LEVOTHYROXINE SODIUM 0.07 MG/1
75 TABLET ORAL EVERY MORNING
Status: DISCONTINUED | OUTPATIENT
Start: 2022-05-19 | End: 2022-05-20 | Stop reason: HOSPADM

## 2022-05-19 RX ORDER — ATORVASTATIN CALCIUM 40 MG/1
40 TABLET, FILM COATED ORAL NIGHTLY
Status: DISCONTINUED | OUTPATIENT
Start: 2022-05-19 | End: 2022-05-20 | Stop reason: HOSPADM

## 2022-05-19 RX ORDER — SODIUM CHLORIDE 0.9 % (FLUSH) 0.9 %
5-40 SYRINGE (ML) INJECTION 2 TIMES DAILY
Status: DISCONTINUED | OUTPATIENT
Start: 2022-05-19 | End: 2022-05-20 | Stop reason: HOSPADM

## 2022-05-19 RX ORDER — FINASTERIDE 5 MG/1
5 TABLET, FILM COATED ORAL DAILY
Status: DISCONTINUED | OUTPATIENT
Start: 2022-05-19 | End: 2022-05-20 | Stop reason: HOSPADM

## 2022-05-19 RX ORDER — ENOXAPARIN SODIUM 100 MG/ML
30 INJECTION SUBCUTANEOUS DAILY
Status: DISCONTINUED | OUTPATIENT
Start: 2022-05-19 | End: 2022-05-20 | Stop reason: HOSPADM

## 2022-05-19 RX ORDER — ASPIRIN 81 MG/1
81 TABLET, CHEWABLE ORAL DAILY
Status: DISCONTINUED | OUTPATIENT
Start: 2022-05-19 | End: 2022-05-20 | Stop reason: HOSPADM

## 2022-05-19 RX ORDER — CLOPIDOGREL BISULFATE 75 MG/1
75 TABLET ORAL DAILY
Status: DISCONTINUED | OUTPATIENT
Start: 2022-05-19 | End: 2022-05-20 | Stop reason: HOSPADM

## 2022-05-19 RX ORDER — ACETAMINOPHEN 325 MG/1
650 TABLET ORAL EVERY 6 HOURS PRN
Status: DISCONTINUED | OUTPATIENT
Start: 2022-05-19 | End: 2022-05-20 | Stop reason: HOSPADM

## 2022-05-19 RX ADMIN — LEVOTHYROXINE SODIUM 75 MCG: 0.07 TABLET ORAL at 09:58

## 2022-05-19 RX ADMIN — SODIUM CHLORIDE, PRESERVATIVE FREE 10 ML: 5 INJECTION INTRAVENOUS at 21:00

## 2022-05-19 RX ADMIN — FINASTERIDE 5 MG: 5 TABLET, FILM COATED ORAL at 09:58

## 2022-05-19 RX ADMIN — ASPIRIN 81 MG 81 MG: 81 TABLET ORAL at 10:00

## 2022-05-19 RX ADMIN — ENOXAPARIN SODIUM 30 MG: 100 INJECTION SUBCUTANEOUS at 09:58

## 2022-05-19 RX ADMIN — PIPERACILLIN AND TAZOBACTAM 4500 MG: 4; .5 INJECTION, POWDER, LYOPHILIZED, FOR SOLUTION INTRAVENOUS at 03:02

## 2022-05-19 RX ADMIN — SODIUM CHLORIDE, PRESERVATIVE FREE 10 ML: 5 INJECTION INTRAVENOUS at 09:57

## 2022-05-19 RX ADMIN — ATORVASTATIN CALCIUM 40 MG: 40 TABLET, FILM COATED ORAL at 21:00

## 2022-05-19 RX ADMIN — Medication: at 09:55

## 2022-05-19 RX ADMIN — VANCOMYCIN HYDROCHLORIDE 1250 MG: 10 INJECTION, POWDER, LYOPHILIZED, FOR SOLUTION INTRAVENOUS at 04:11

## 2022-05-19 RX ADMIN — PIPERACILLIN AND TAZOBACTAM 4500 MG: 4; .5 INJECTION, POWDER, LYOPHILIZED, FOR SOLUTION INTRAVENOUS at 15:00

## 2022-05-19 RX ADMIN — CLOPIDOGREL BISULFATE 75 MG: 75 TABLET ORAL at 09:59

## 2022-05-19 RX ADMIN — Medication: at 21:00

## 2022-05-19 ASSESSMENT — PAIN SCALES - GENERAL
PAINLEVEL_OUTOF10: 0
PAINLEVEL_OUTOF10: 0

## 2022-05-19 NOTE — PLAN OF CARE
-Reviewed chart for possible HF, patient does not have history of nor admitted for HF  -Advised to follow up with PCP post hospital discharge.

## 2022-05-19 NOTE — CONSULTS
[facility] CONSULT NOTE    Reason for Consult: Fever    Requested by: Marcela Taylor MD     Chief complaint: Tremors    History Obtained From: Patient and EMR      HISTORY Kaylee              The patient is a [de-identified] y.o. male with history of hypertension, peripheral vascular disease, COPD, lung CA s/p partial left lung lobectomy in 02/2022, previously admitted from 05/12-05/17 for acute left JOSELYN ischemic stroke for which he received tPA, readmitted on 05/18 for tremors. On admission, he had on episode of fever recorded at 101. 2F with leukocytosis of 13,000. Urinalysis showed no pyuria. CXR showed persistent focal density in the left midlung similar to that in 04/2022. SARS-CoV-2 PCR was not detected. He received a dose of piperacillin-tazobactam and vancomycin on admission. ID service was subsequently consulted for further recommendations.     Past Medical History  Past Medical History:   Diagnosis Date    Cancer [facility]) 2014    prostate    Dehydration 11/10/2019    Hypertension     Inguinal hernia     Mediastinal adenopathy 10/2017    Peripheral vascular disease ([facility]     Thyroid disease        Current Facility-Administered Medications   Medication Dose Route Frequency Provider Last Rate Last Admin    acetaminophen (TYLENOL) tablet 650 mg  650 mg Oral Q6H PRN Marcela Taylor MD        aspirin chewable tablet 81 mg  81 mg Oral Daily Marcela Taylor MD   81 mg at 05/19/22 1000    atorvastatin (LIPITOR) tablet 40 mg  40 mg Oral Nightly Marcela Taylor MD        clopidogrel (PLAVIX) tablet 75 mg  75 mg Oral Daily Marcela Taylor MD   75 mg at 05/19/22 0959    finasteride (PROSCAR) tablet 5 mg  5 mg Oral Daily Marcela Taylor MD   5 mg at 05/19/22 0958    levothyroxine (SYNTHROID) tablet 75 mcg  75 mcg Oral QAM Marcela Taylor MD   75 mcg at 05/19/22 0958    enoxaparin Sodium (LOVENOX) injection 30 mg  30 mg SubCUTAneous Daily Marcela Taylor MD   30 mg at 05/19/22 0479  sodium chloride flush 0.9 % injection 5-40 mL  5-40 mL IntraVENous BID Patricia Lynn MD   10 mL at 05/19/22 0957    sodium chloride flush 0.9 % injection 5-40 mL  5-40 mL IntraVENous PRN Patricia Lynn MD        miconazole nitrate 2 % ointment   Topical BID Patricia Lynn MD   Given at 05/19/22 7355       No Known Allergies    Surgical History  Past Surgical History:   Procedure Laterality Date    ABDOMEN SURGERY      APPENDECTOMY      BALLOON ANGIOPLASTY, ARTERY  8/6/2014    [facility] 8x10 left common iliac Dr Kiet Franco    BRONCHOSCOPY  10/27/2017    COLONOSCOPY  20106    HERNIA REPAIR Right     R inguinal    TONSILLECTOMY      VASCULAR SURGERY      stent left leg        Social History  Social History     Socioeconomic History    Marital status:    Occupational History    Occupation: retired-capnter    Tobacco Use    Smoking status: Current Every Day Smoker     Packs/day: 0.50     Years: 63.00     Pack years: 31.50     Types: Cigarettes     Start date: 6/1/1956    Smokeless tobacco: Current User     Types: Snuff     Last attempt to quit: 11/21/2014   Vaping Use    Vaping Use: Never used   Substance and Sexual Activity    Alcohol use:  Yes     Alcohol/week: 14.0 standard drinks     Types: 14 Cans of beer per week     Comment: 2 beer daily    Drug use: Never    Sexual activity: Never         Family Medical History  Family History   Problem Relation Age of Onset    Cancer Brother     Heart Failure Mother     Pacemaker Mother     Stroke Father        Review of Systems:  Constitutional: Had fever, no chills  Eyes: No vision changes, no retroorbital pain  ENT: No hearing changes, no ear pain  Respiratory: No cough, no dyspnea  Cardiovascular: No chest pain, no palpitations  Gastrointestinal: No abdominal pain, no diarrhea  Genitourinary: No dysuria, no hematuria  Integumentary: No rash, no itching  Musculoskeletal: No muscle pain, no joint pain  Neurologic: No headache, no numbness in extremities    Physical Examination:  Vitals:    05/19/22 0510 05/19/22 0530 05/19/22 0539 05/19/22 0917   BP: (!) 96/52  99/66 91/63   Pulse: 58  61 67   Resp: 16  20 16   Temp:   98.1 °F (36.7 °C) 97.3 °F (36.3 °C)   TempSrc:   Oral Oral   SpO2: 98%  99% 100%   Weight:  119 lb 1.6 oz (54 kg)     Height:  5' 7\" (1.702 m)       Constitutional: Alert, not in distress  Eyes: Sclerae anicteric, no conjunctival erythema  ENT: No buccal lesion, no pharyngeal exudates  Neck: No nuchal rigidity, no cervical adenopathy  Lungs: Clear breath sounds, no crackles, no wheezes  Heart: Regular rate and rhythm, no murmurs  Abdomen: Bowel sounds present, soft, nontender  Skin: Warm and dry, no active dermatoses  Musculoskeletal: No joint erythema, no joint swelling    Labs, imaging, and medical records/notes were personally reviewed. Assessment:  Fever and leukocytosis, resolved, etiology unclear, doubt sepsis in etiology  Recent acute left JOSELYN ischemic stroke    Plan:  Stop piperacillin-tazobactam. Monitor clinically off antibiotics for now. Follow-up blood cultures. Continue supportive care. Thank you for involving me in the care of Skye Delarosa. I will continue to follow. Please do not hesitate to call for any questions or concerns.     Electronically signed by Nia Donnelly MD on 5/19/2022 at 10:14 AM

## 2022-05-19 NOTE — PROGRESS NOTES
Recently discharged following left hemispheric ischemic stroke   Onset yesterday of confusion with rigors and fever  Much better today after parenteral ATB  Fully alert and oriented  Afebrile and hemodynamically stable  RRR with distant tones  Lungs clear with diminished BS  Abdomen soft without tenderness  Cultures sent  ID consult pending

## 2022-05-19 NOTE — ED NOTES
Report called, SBAR faxed, patient marked ready for transport.      Ashleigh Mittal RN  05/19/22 8255

## 2022-05-19 NOTE — PROGRESS NOTES
Physical Therapy  Physical Therapy Initial Assessment     Name: Oumar Nichole  : 1940  MRN: [id number]      Date of Service: 2022    Evaluating PT:  Christiano Wesley PT, DPDAVE [id number]     Room #:  6560/0585-N  Diagnosis:  Hospital-acquired pneumonia [J18.9, Y95]  Sepsis ([facility] [C24.5]  Acute metabolic encephalopathy [V41.88]  Sepsis with encephalopathy without septic shock, due to unspecified organism ([facility] [A41.9, R65.20, G93.40]  Reason for admission: AMS   Precautions:  Falls, recent CVA admission  Procedure/Surgery:  None   Equipment Recommendations:  FWW (pt owns)    SUBJECTIVE:  Pt lives with wife and son in a 1 story home with 2 DONI no rail. Pt ambulated with no AD or FWW \"If I remember to use it\". OBJECTIVE:   Initial Evaluation  Date:  Treatment   Short Term/ Long Term   Goals   AM-PAC 6 Clicks 39/48     Was pt agreeable to Eval/treatment? Yes      Does pt have pain?  Denies      Bed Mobility  Rolling: NT  Supine to sit: SBA  Sit to supine: SBA  Scooting: SBA  Independent    Transfers Sit to stand: SBA  Stand to sit: SBA  Stand pivot: NT  Independent    Ambulation    85 feet with no AD CGA  And  85 feet with [employer] SBA    >300 feet with [employer] Mod I vs independent no AD   Stair negotiation: ascended and descended  NT  2 steps with 0 rail independent      LE ROM: WFL    LE Strength:   knee ext: 5/5  ankle DF: 5/5    Balance:   Sitting static: Supervision  Sitting dynamic: SBA  Standing static: SBA  Standing dynamic: SBA [employer]    -Pt is A & O x 3  -Sensation:  Pt denies numbness and tingling to extremities  -Edema:  unremarkable     -Vitals  NT    Therapeutic Exercises:  Functional activity     Patient education  Pt educated on safety, sequencing of transfers, and role of PT    Patient response to education:   Pt verbalized understanding Pt demonstrated skill Pt requires further education in this area   Yes  Yes  Yes      ASSESSMENT:  Conditions Requiring Skilled Therapeutic Intervention:  []Decreased THERAPY PLAN OF CARE:    PT POC is established based on physician order and patient diagnosis     Referring provider/PT Order:  05/19/22 0900   PT eval and treat Start: 05/19/22 0900, End: 05/19/22 0900, ONE TIME, Standing Count: 1 Occurrences, Florencio Paz MD     Diagnosis:  Hospital-acquired pneumonia [J18.9, Y95]  Sepsis ([facility] [Z84.6]  Acute metabolic encephalopathy [G08.32]  Sepsis with encephalopathy without septic shock, due to unspecified organism ([facility] [A41.9, R65.20, G93.40]  Specific instructions for next treatment:  Progress ambulation. Stairs     Current Treatment Recommendations:   [] Strengthening to improve independence with functional mobility   [] ROM to improve independence with functional mobility   [x] Balance Training to improve static/dynamic balance and to reduce fall risk  [x] Endurance Training to improve activity tolerance during functional mobility   [x] Transfer Training to improve safety and independence with all functional transfers   [x] Gait Training to improve gait mechanics, endurance and asses need for appropriate assistive device  [x] Stair Training in preparation for safe discharge home and/or into the community   [x] Positioning to prevent skin breakdown and contractures  [x] Safety and Education Training   [] Patient/Caregiver Education   [] HEP  [] Other     PT long term treatment goals are located in above grid    Frequency of treatments: 2-5x/week x 1-2 weeks. Time in  1020  Time out  1040    Total Treatment Time  10 minutes     Evaluation Time includes thorough review of current medical information, gathering information on past medical history/social history and prior level of function, completion of standardized testing/informal observation of tasks, assessment of data and education on plan of care and goals.     CPT codes:  [x] Low Complexity PT evaluation 36405  [] Moderate Complexity PT evaluation 93786  [] High Complexity PT evaluation 47373  [] PT Re-evaluation [id number]  [] Gait training 51309 - minutes  [] Manual therapy 99259 - minutes  [x] Therapeutic activities 51159 10 minutes  [] Therapeutic exercises 39521 - minutes  [] Neuromuscular reeducation 43264 - minutes     Mukesh York, PT, DPT  [id number]

## 2022-05-19 NOTE — BH NOTE
Wife Pam Jones notified of admit to [phone]. She is requesting rehab revaluationfor her . She states he was  very unsteady and off balance when he was at home yesterday. Med list updated and completed with wife.  Boston Cotton, RN  5/19/2022

## 2022-05-19 NOTE — H&P
[address]                  [address] [place],  [facility]                              HISTORY AND PHYSICAL    PATIENT NAME: Slade Pedroza                     :        1940  MED REC NO:   [id number]                            ROOM:       [facility]  ACCOUNT NO:   [de-identified]                           ADMIT DATE: 2022  PROVIDER:     Sumit Olivo DO    CHIEF COMPLAINT:  The patient is an 55-year-old male With confusion,  rigors, and fever. HISTORY OF PRESENT ILLNESS:  The patient is an 55-year-old gentleman  with a complicated past medical history including recent discharge  following acute left hemispheric cerebral infarction successfully  treated with tPA, recent left upper lobe lobectomy for lung carcinoma in  [facility], COPD, peripheral artery disease, carotid artery stenosis,  hypothyroidism, prostate carcinoma, clinical depression who describes  onset of symptoms on the day of admission. Actually his wife called me  on 2022 complaining that he had a newly altered mental status with  confusion, generalized fatigability what sounds like rigors and  low-grade fever. This was confirmed in the emergency room where he had  some leukocytosis, fever, and possible x-ray evidence of pneumonitis. The patient received cultures and was treated empirically with  parenteral antibiotics. Today, he is fully alert and oriented. MEDICATIONS:  See chart. PAST MEDICAL HISTORY:  Acute left hemispheric cerebral infarction  treated with tPA, lung carcinoma status post left upper lobe partial  lobectomy, COPD, previous smoker, peripheral artery disease, carotid  artery stenosis, hypothyroidism, clinical depression, prostate  carcinoma. SOCIAL HISTORY:  Just recently quit smoking. No alcohol use. FAMILY HISTORY:  Not available. REVIEW OF SYSTEMS:  NEUROMUSCULAR:  Denies headache or dizziness.   RESPIRATORY:  No shortness of breath, cough, fevers or night sweats at  this time. CARDIOVASCULAR:  Denies chest pains or palpitation. GASTROINTESTINAL:  The patient denies nausea, vomiting, diarrhea,  anorexia, abdominal pain. GENITOURINARY:  No dysuria, hematuria, oliguria, _____. MUSCULOSKELETAL:  Denies myalgia or polyarthralgia. PSYCHIATRIC:  Denies. PHYSICAL EXAMINATION:  GENERAL:  The patient is a pleasant [de-identified]year-old gentleman, alert and  oriented, in no acute distress. HEENT:  Head normal size and shape. Pupils were equal and reactive. Fundi were not visualized. Extraocular motions were intact. Tympanic  membranes were not visualized. Oral mucous membranes were clear and  adequately hydrated. NECK:  Was supple. Neck veins were flat. Bilateral carotid bruits. No  palpable extrinsic neck masses or regional lymphadenopathy. LUNGS:  Demonstrated diminished breath sounds bilaterally. CARDIOVASCULAR:  Regular rate and rhythm with distant tones. ABDOMEN:  Soft and flat. Bowel sounds intact. Femoral pulses were  intact. No tenderness or rebound. GENITOURINARY:  Deferred. RECTAL:  Deferred. EXTREMITIES:  No edema, clubbing, no cyanosis. No asymmetry of lower  extremity circumference. Diminished distal pulses. NEUROLOGIC:  No focal neurologic deficits. CLINICAL IMPRESSION:  1. Sepsis. 2.  Possible hospital-acquired pneumonia. 3.  Recent left upper lobe lobectomy for lung carcinoma. 4.  Chronic obstructive pulmonary disease. 5.  Peripheral artery disease. 6.  Carotid artery stenosis. 7.  Hypothyroidism. 8.  Depression. 9.  Remote history of prostate carcinoma.         Wu Kennedy DO    D: 05/19/2022 12:58:58       T: 05/19/2022 13:01:15     [email or web address]  Job#: [id number]     Doc#: [id number]    CC:

## 2022-05-19 NOTE — PLAN OF CARE
Problem: Chronic Conditions and Co-morbidities  Goal: Patient's chronic conditions and co-morbidity symptoms are monitored and maintained or improved  Outcome: Progressing     Problem: Discharge Planning  Goal: Discharge to home or other facility with appropriate resources  Outcome: Progressing     Problem: Skin/Tissue Integrity  Goal: Absence of new skin breakdown  Description: 1. Monitor for areas of redness and/or skin breakdown  2. Assess vascular access sites hourly  3. Every 4-6 hours minimum:  Change oxygen saturation probe site  4. Every 4-6 hours:  If on nasal continuous positive airway pressure, respiratory therapy assess nares and determine need for appliance change or resting period.   Outcome: Progressing     Problem: Safety - Adult  Goal: Free from fall injury  Outcome: Progressing     Problem: ABCDS Injury Assessment  Goal: Absence of physical injury  Outcome: Progressing  Flowsheets (Taken 5/19/2022 7636 by Bear Cuelol RN)  Absence of Physical Injury: Implement safety measures based on patient assessment

## 2022-05-19 NOTE — PROGRESS NOTES
Occupational Therapy  OCCUPATIONAL THERAPY INITIAL EVALUATION     [facility] [address] [address],  [place], [address]                                                Patient Name: Nini George  MRN: [id number]  : 1940  Room: [address] [place] [address]    Evaluating OT: Oscar Gregory OTR/L #[id number]     Referring Provider: Miguel Angel De Souza MD  Specific Provider Orders/Date: OT eval and treat 22    Diagnosis: Hospital-acquired pneumonia [J18.9, Y95]  Sepsis ([facility] [address] [E54.3]  Acute metabolic encephalopathy [O00.60]  Sepsis with encephalopathy without septic shock, due to unspecified organism ([address] [A41.9, R65.20, G93.40]   Pt admitted to hospital with AMS. Recent hospitalization with CVA     Pertinent Medical History:  has a past medical history of Cancer ([facility] [address] Dehydration, Hypertension, Inguinal hernia, Mediastinal adenopathy, Peripheral vascular disease ([address] and Thyroid disease.        Precautions:  Fall Risk, bed alarm     Assessment of current deficits    [x] Functional mobility  [x]ADLs  [x] Strength               []Cognition    [x] Functional transfers   [x] IADLs         [x] Safety Awareness   [x]Endurance    [] Fine Coordination              [x] Balance      [] Vision/perception   []Sensation     []Gross Motor Coordination  [] ROM  [] Delirium                   [] Motor Control     OT PLAN OF CARE   OT POC based on physician orders, patient diagnosis and results of clinical assessment    Frequency/Duration 1-3 days/wk for 2 weeks PRN   Specific OT Treatment Interventions to include:   * Instruction/training on adapted ADL techniques and AE recommendations to increase functional independence within precautions       * Training on energy conservation strategies, correct breathing pattern and techniques to improve independence/tolerance for self-care routine  * Functional transfer/mobility training/DME recommendations for increased independence, safety, and fall prevention  * Patient/Family education to increase follow through with safety techniques and functional independence  * Recommendation of environmental modifications for increased safety with functional transfers/mobility and ADLs  * Cognitive retraining/development of therapeutic activities to improve problem solving, judgement, memory, and attention for increased safety/participation in ADL/IADL tasks  * Therapeutic exercise to improve motor endurance, ROM, and functional strength for ADLs/functional transfers  * Therapeutic activities to facilitate/challenge dynamic balance, stand tolerance for increased safety and independence with ADLs  * Therapeutic activities to facilitate gross/fine motor skills for increased independence with ADLs  * Neuro-muscular re-education: facilitation of righting/equilibrium reactions, midline orientation, scapular stability/mobility, normalization of muscle tone, and facilitation of volitional active controled movement    Recommended Adaptive Equipment:  TBD     Home Living: Pt lives with wife and son in a 1 story home with 2 DONI and no hand rails    Bathroom setup: tub/shower combo    Equipment owned: w/w    Prior Level of Function: mod I with ADLs , mod I with IADLs; ambulated without AD; utilizing w/w prn recently    Driving: no   Occupation: na    Pain Level: Pt denies pain this session    Cognition: A&O: x3 (self, place, month/year);  Follows 2 step directions   Memory:  Fair (3/3 short term recall with cues)   Sequencing:  fair   Problem solving:  fair   Judgement/safety:  fair     Functional Assessment:  AM-PAC Daily Activity Raw Score: 19/24   Initial Eval Status  Date: 5/19/22 Treatment Status  Date: STGs = LTGs  Time frame: 10-14 days   Feeding Independent      Grooming Stand by Assist     Standing at sink   Modified [place]    UB Dressing Stand by Assist     yelena  Modified [place]    LB Dressing Stand by Assist     brief, socks  Modified [place]    Bathing Minimal Assist  Modified [place]    Toileting Stand by Assist   Modified [place]    Bed Mobility  Supine to sit: Stand by Assist   Sit to supine: Stand by Assist    Supine to sit: Modified [place]   Sit to supine: Modified [place]    Functional Transfers Stand by Assist   Modified [place]    Functional Mobility Stand by Assist   Ambulated in room and hallway with ww      CGA   ambulating without AD  Modified [place]    Balance Sitting:     Static:  Sup    Dynamic:SBA  Standing: SBA     Activity Tolerance F    Light activity   G   Visual/  Perceptual Glasses: no  wfl                     Hand Dominance right   Strength ROM Additional Info:    RUE   4-/5 wfl good  and wfl FMC/dexterity noted during ADL tasks     LUE 4-/5 wfl good  and wfl FMC/dexterity noted during ADL tasks   Finger to nose coordination intact bilaterally   Hearing: wfl  Sensation: denies numbness and tingling   Tone: wfl  Edema:none noted     Comments: Upon arrival patient supine in bed and agreeable to OT session. Therapist educated pt on role of OT. At end of session, patient seated in chair  with call light and phone within reach, all lines and tubes intact. Overall patient demonstrated decreased independence and safety during completion of ADL/functional transfer/mobility tasks. Pt would benefit from continued skilled OT to increase safety and independence with completion of ADL/IADL tasks for functional independence and quality of life.     Treatment: OT treatment provided this date includes: Facilitation of bed mobility, unsupported sitting balance at EOB (impacting ADLs; addressing posture, weight shifting, dynamic reaching), functional transfers (various surfaces: bed, toilet, chair), standing tolerance tasks (addressing posture, balance and activity tolerance while incorporating light functional reaching; impacting ADLs and functional activity) and functional ambulation tasks with w/w (including to/ from bathroom and in preparation for item retrieval tasks; cuing on posture, w/w management and safety) - skilled cuing on hand placement, posture, body mechanics, energy conservation techniques and safety. Therapist facilitated self-care retraining: UB/LB self-care tasks (robe, brief, socks),  Toileting task (all aspects; clothing management / hygiene) and standing grooming tasks at sink while educating pt on modified techniques, posture, safety and energy conservation techniques. Skilled monitoring of HR, O2 sats and pts response to treatment. Rehab Potential: Good for established goals     Patient / Family Goal: return home      Patient and/or family were instructed on functional diagnosis, prognosis/goals and OT plan of care. Demonstrated fair understanding. Eval Complexity: Low    Time In: 1015  Time Out: 1040  Total Treatment Time: 10 minutes    Min Units   OT Eval Low 97165  x  1   OT Eval Medium 48580      OT Eval High 51507      OT Re-Eval [id number]       Therapeutic Ex 52011       Therapeutic Activities 17840 2     ADL/Self Care 88419  8  1   Orthotic Management 58549       Manual [id number]     Neuro Re-Ed 54916       Non-Billable Time          Evaluation Time additionally includes thorough review of current medical information, gathering information on past medical history/social history and prior level of function, interpretation of standardized testing/informal observation of tasks, assessment of data and development of plan of care and goals.           Claudia Morales OTR/L #[id number]

## 2022-05-20 VITALS
WEIGHT: 119.1 LBS | OXYGEN SATURATION: 100 % | DIASTOLIC BLOOD PRESSURE: 68 MMHG | BODY MASS INDEX: 18.69 KG/M2 | TEMPERATURE: 98.5 F | RESPIRATION RATE: 16 BRPM | SYSTOLIC BLOOD PRESSURE: 150 MMHG | HEIGHT: 67 IN | HEART RATE: 68 BPM

## 2022-05-20 PROBLEM — E44.0 MODERATE PROTEIN-CALORIE MALNUTRITION (HCC): Chronic | Status: ACTIVE | Noted: 2022-05-20

## 2022-05-20 LAB
ALBUMIN SERPL-MCNC: 3.3 G/DL (ref 3.5–5.2)
ALP BLD-CCNC: 78 U/L (ref 40–129)
ALT SERPL-CCNC: 7 U/L (ref 0–40)
ANION GAP SERPL CALCULATED.3IONS-SCNC: 11 MMOL/L (ref 7–16)
AST SERPL-CCNC: 10 U/L (ref 0–39)
BILIRUB SERPL-MCNC: 0.6 MG/DL (ref 0–1.2)
BUN BLDV-MCNC: 14 MG/DL (ref 6–23)
CALCIUM SERPL-MCNC: 9.1 MG/DL (ref 8.6–10.2)
CHLORIDE BLD-SCNC: 101 MMOL/L (ref 98–107)
CO2: 24 MMOL/L (ref 22–29)
CREAT SERPL-MCNC: 1.3 MG/DL (ref 0.7–1.2)
GFR AFRICAN AMERICAN: >60
GFR NON-AFRICAN AMERICAN: 53 ML/MIN/1.73
GLUCOSE BLD-MCNC: 98 MG/DL (ref 74–99)
HCT VFR BLD CALC: 36.4 % (ref 37–54)
HEMOGLOBIN: 11.9 G/DL (ref 12.5–16.5)
MCH RBC QN AUTO: 27.5 PG (ref 26–35)
MCHC RBC AUTO-ENTMCNC: 32.7 % (ref 32–34.5)
MCV RBC AUTO: 84.3 FL (ref 80–99.9)
PDW BLD-RTO: 13.4 FL (ref 11.5–15)
PLATELET # BLD: 321 E9/L (ref 130–450)
PMV BLD AUTO: 8.6 FL (ref 7–12)
POTASSIUM SERPL-SCNC: 3.7 MMOL/L (ref 3.5–5)
RBC # BLD: 4.32 E12/L (ref 3.8–5.8)
SODIUM BLD-SCNC: 136 MMOL/L (ref 132–146)
TOTAL PROTEIN: 5.9 G/DL (ref 6.4–8.3)
WBC # BLD: 8.5 E9/L (ref 4.5–11.5)

## 2022-05-20 PROCEDURE — 96372 THER/PROPH/DIAG INJ SC/IM: CPT

## 2022-05-20 PROCEDURE — 2580000003 HC RX 258: Performed by: INTERNAL MEDICINE

## 2022-05-20 PROCEDURE — 80053 COMPREHEN METABOLIC PANEL: CPT

## 2022-05-20 PROCEDURE — 36415 COLL VENOUS BLD VENIPUNCTURE: CPT

## 2022-05-20 PROCEDURE — 85027 COMPLETE CBC AUTOMATED: CPT

## 2022-05-20 PROCEDURE — 6360000002 HC RX W HCPCS: Performed by: INTERNAL MEDICINE

## 2022-05-20 PROCEDURE — G0378 HOSPITAL OBSERVATION PER HR: HCPCS

## 2022-05-20 PROCEDURE — 6370000000 HC RX 637 (ALT 250 FOR IP): Performed by: INTERNAL MEDICINE

## 2022-05-20 RX ADMIN — SODIUM CHLORIDE, PRESERVATIVE FREE 10 ML: 5 INJECTION INTRAVENOUS at 08:49

## 2022-05-20 RX ADMIN — Medication: at 08:49

## 2022-05-20 RX ADMIN — ASPIRIN 81 MG 81 MG: 81 TABLET ORAL at 08:49

## 2022-05-20 RX ADMIN — ENOXAPARIN SODIUM 30 MG: 100 INJECTION SUBCUTANEOUS at 08:49

## 2022-05-20 RX ADMIN — LEVOTHYROXINE SODIUM 75 MCG: 0.07 TABLET ORAL at 08:49

## 2022-05-20 RX ADMIN — FINASTERIDE 5 MG: 5 TABLET, FILM COATED ORAL at 08:48

## 2022-05-20 RX ADMIN — CLOPIDOGREL BISULFATE 75 MG: 75 TABLET ORAL at 08:48

## 2022-05-20 ASSESSMENT — PAIN SCALES - GENERAL: PAINLEVEL_OUTOF10: 0

## 2022-05-20 NOTE — PROGRESS NOTES
KACIE PROGRESS NOTE      Chief complaint: Follow-up of fever     The patient is a [de-identified] y.o. male with history of hypertension, peripheral vascular disease, COPD, lung CA s/p partial left lung lobectomy in 02/2022, previously admitted from 05/12-05/17 for acute left JOSELYN ischemic stroke for which he received tPA, readmitted on 05/18 for tremors. On admission, he had on episode of fever recorded at 101. 2F with leukocytosis of 13,000. Urinalysis showed no pyuria. CXR showed persistent focal density in the left midlung similar to that in 04/2022. SARS-CoV-2 PCR was not detected. Blood cultures showed no growth to date. He received a dose of piperacillin-tazobactam and vancomycin on admission. Subjective: Patient was seen and examined. No chills, no abdominal pain, no diarrhea, no rash, no itching. He reports feeling well. Objective:    Vitals:    05/20/22 0815   BP: (!) 150/68   Pulse: 68   Resp: 16   Temp: 98.5 °F (36.9 °C)   SpO2: 100%     Constitutional: Alert, not in distress  Respiratory: Clear breath sounds, no crackles, no wheezes  Cardiovascular: Regular rate and rhythm, no murmurs  Gastrointestinal: Bowel sounds present, soft, nontender  Skin: Warm and dry, no active dermatoses  Musculoskeletal: No joint swelling, no joint erythema    Labs, imaging, and medical records/notes were personally reviewed. Assessment:  Fever and leukocytosis, resolved, etiology unclear, doubt sepsis in etiology  Recent acute left JOSELYN ischemic stroke    Recommendations:  Monitor clinically off antibiotics for now. Follow-up blood cultures. Continue supportive care. Thank you for involving me in the care of Gary Emmanuel. I will sign off for now. Please do not hesitate to call for any questions, concerns, or new findings.       Electronically signed by Ruy Collazo MD on 5/20/2022 at 9:38 AM

## 2022-05-20 NOTE — DISCHARGE SUMMARY
VW)    Result Date: 5/18/2022  EXAMINATION: TWO XRAY VIEWS OF THE CHEST 5/18/2022 7:40 pm COMPARISON: April 28, 2022 HISTORY: ORDERING SYSTEM PROVIDED HISTORY: r/o pneumonia TECHNOLOGIST PROVIDED HISTORY: Reason for exam:->r/o pneumonia What reading provider will be dictating this exam?->CRC FINDINGS: There is focal opacity in the left mid lung. The right lung is clear. The heart is not enlarged. There is no pneumothorax. There is minimal blunting of the left costophrenic angle. Persistent focal density in the left midlung. Follow-up study recommended. Minimal left pleural effusion. CT Head WO Contrast    Result Date: 5/18/2022  EXAMINATION: CT OF THE HEAD WITHOUT CONTRAST  5/18/2022 7:51 pm TECHNIQUE: CT of the head was performed without the administration of intravenous contrast. Automated exposure control, iterative reconstruction, and/or weight based adjustment of the mA/kV was utilized to reduce the radiation dose to as low as reasonably achievable. COMPARISON: May 13, 2022 HISTORY: ORDERING SYSTEM PROVIDED HISTORY: ams, recent CVA TECHNOLOGIST PROVIDED HISTORY: Reason for exam:->ams, recent CVA Has a \"code stroke\" or \"stroke alert\" been called? ->No Decision Support Exception - unselect if not a suspected or confirmed emergency medical condition->Emergency Medical Condition (MA) What reading provider will be dictating this exam?->CRC FINDINGS: Confluent areas of hypoattenuation throughout white matter of both hemispheres suggesting chronic microvascular ischemia. No abnormal extra-axial fluid collections. There is prominence of sulci, cisterns, and ventricles related to generalized parenchymal volume loss. There is mucosal thickening involving the ethmoid sinuses and left maxillary sinus. Mastoid air cells are clear. No acute intracranial abnormality. Discharge Exam:    HEENT: NCAT,  PERRLA, No JVD  Heart:  RRR, no murmurs, gallops, or rubs.   Lungs:  CTA bilaterally, no wheeze, rales or rhonchi  Abd: bowel sounds present, nontender, nondistended, no masses  Extrem:  No clubbing, cyanosis, or edema    Disposition: home     Patient Condition at Discharge: Stable    Patient Instructions:      Medication List      CONTINUE taking these medications    acetaminophen 325 MG tablet  Commonly known as: TYLENOL     aspirin 81 MG chewable tablet  Take 1 tablet by mouth daily for 30 doses     atorvastatin 40 MG tablet  Commonly known as: LIPITOR  Take 1 tablet by mouth nightly for 30 doses     clopidogrel 75 MG tablet  Commonly known as: PLAVIX  Take 1 tablet by mouth daily for 30 doses     finasteride 5 MG tablet  Commonly known as: PROSCAR     levothyroxine 75 MCG tablet  Commonly known as: SYNTHROID     mirtazapine 15 MG tablet  Commonly known as: REMERON          Activity: activity as tolerated  Diet: regular diet    Pt has been advised to: Follow-up with Patricia Lynn MD in 1 week.   Follow-up with consultants as recommended by them    Note that over 30 minutes was spent in preparing discharge papers, discussing discharge with patient, medication review, etc.    Signed:  Sal Gonzales MD  5/20/2022  2:55 PM

## 2022-05-20 NOTE — PROGRESS NOTES
Comprehensive Nutrition Assessment    Type and Reason for Visit:  Consult    Nutrition Recommendations/Plan:   1. Recommend and start Ensure Enlive supplement BID to help meet increased nutritional needs. Malnutrition Assessment:  Malnutrition Status: Moderate malnutrition (05/20/22 1420)    Context:  Chronic Illness     Findings of the 6 clinical characteristics of malnutrition:  Energy Intake:  75% or less estimated energy requirements for 1 month or longer  Weight Loss:  Greater than 10% over 6 months     Body Fat Loss:  Mild body fat loss Orbital,Buccal region   Muscle Mass Loss:  Mild muscle mass loss Temples (temporalis),Clavicles (pectoralis & deltoids)  Fluid Accumulation:  No significant fluid accumulation     Strength:  Not Performed    Nutrition Assessment:    Pt admitte w/ tremors and sepsis  ; noted PNA and acute metabolic encephalopathy ; PMHx of Lung-CA (lobectomy) and prostate CA ; hx of  CKD/COPD/CVA/thyroid disease ; appetite fair ; pt also meets criteria for moderate malnutrition ; will start ONS and monitor    Nutrition Related Findings:    I&Os WNL,A&O x 4, no edema, muscle/fat wasting, AMS Wound Type: Wound Consult Pending (erosion to buttocks)       Current Nutrition Intake & Therapies:    Average Meal Intake: 26-50%  Average Supplements Intake: None Ordered  ADULT DIET; Regular    Anthropometric Measures:  Height: 5' 7\" (170.2 cm)  Ideal Body Weight (IBW): 148 lbs (67 kg)    Admission Body Weight: 119 lb (54 kg) (5/19, Actual standing)  Current Body Weight: 119 lb (54 kg) (5/19, Actual), 80.4 % IBW. Weight Source: Standing Scale  Current BMI (kg/m2): 18.6  Usual Body Weight: 141 lb (64 kg) (EMR shows past weight of 141# on 1/13/22 ;  Weight loss of 22# in the past 5 months (15%))  % Weight Change (Calculated): -15.6  Weight Adjustment For: No Adjustment                 BMI Categories: Underweight (BMI less than 22) age over 72    Estimated Daily Nutrient Needs:  Energy Requirements Based On: Formula  Weight Used for Energy Requirements: Current  Energy (kcal/day): 6746-1761 (REE 1210 x 1.2-1.3 SF)  Weight Used for Protein Requirements: Current  Protein (g/day): 70-85g/kg (1.3-1.5g/kg)  Method Used for Fluid Requirements: 1 ml/kcal  Fluid (ml/day): 1791-8014    Nutrition Diagnosis:   · Moderate malnutrition,In context of chronic illness related to catabolic illness (hx of lung CA and COPD) as evidenced by poor intake prior to admission,intake 26-50%,weight loss greater than or equal to 10% in 6 months,mild muscle loss,mild loss of subcutaneous fat      Nutrition Interventions:   Food and/or Nutrient Delivery: Continue Current Diet,Start Oral Nutrition Supplement  Nutrition Education/Counseling: No recommendation at this time  Coordination of Nutrition Care: Continue to monitor while inpatient       Goals:  Previous Goal Met: Progressing toward Goal(s)  Goals: PO intake 50% or greater,by next RD assessment       Nutrition Monitoring and Evaluation:   Behavioral-Environmental Outcomes: None Identified  Food/Nutrient Intake Outcomes: Supplement Intake,Food and Nutrient Intake  Physical Signs/Symptoms Outcomes: Biochemical Data,Chewing or Swallowing,Fluid Status or Edema,Meal Time Behavior,Nutrition Focused Physical Findings,Skin,Weight,GI Status    Discharge Planning:    No discharge needs at this time     John Caballero RD, LD  Contact: [id number]

## 2022-05-20 NOTE — CARE COORDINATION
Patient presented to the ED for shaking and tremors; recently discharged on 5/17 for CVA. Pt lives with his wife in a 1 story home, 3 steps to enter with rail. He He is independent without a device. PCP is Dr. Evens Dupree and uses [facility] [address] in [facility]. Plan is home, patient reports no needs and wife to transport. Charge nurse checking for discharge.     Cathyann Felty, MSW, LSW [phone]

## 2022-05-21 NOTE — PROGRESS NOTES
Discharge instructions given to patient and wife, discussed medications and follow up appointments. All questions answered. No other needs at this time. IV and monitor dc. Pt leaving with all belongings.

## 2022-05-24 LAB
BLOOD CULTURE, ROUTINE: NORMAL
CULTURE, BLOOD 2: NORMAL

## 2022-06-01 DIAGNOSIS — I63.412 CEREBROVASCULAR ACCIDENT (CVA) DUE TO EMBOLISM OF LEFT MIDDLE CEREBRAL ARTERY (HCC): ICD-10-CM

## 2022-06-01 DIAGNOSIS — I67.848 OTHER CEREBROVASCULAR VASOSPASM AND VASOCONSTRICTION: ICD-10-CM

## 2022-06-04 NOTE — DISCHARGE SUMMARY
[address]                  [address] [facility],  [facility]                               DISCHARGE SUMMARY    PATIENT NAME: Teofilo Tavarez                     :        1940  MED REC NO:   [id number]                            ROOM:       8508  ACCOUNT NO:   [de-identified]                           ADMIT DATE: 2022  PROVIDER:     Johnathon Calderon DO                  DISCHARGE DATE:  2022    HOSPITAL COURSE:  The patient is an 43-year-old male with a complicated  past medical history including recent left upper lobectomy, laparoscopic  surgery at [facility] for lung cancer, peripheral artery disease,  chronic artery stenosis, chronic obstructive pulmonary disease, primary  hypothyroidism, prostate carcinoma, clinical depression who described  onset of symptoms on the radha of admission. He presented to the  emergency room urgently with new onset of aphasia and right upper  extremity weakness. He was seen by Interventional Radiology, was not a  candidate for thrombectomy. CT of the brain was unremarkable. CTA  demonstrated occluded left A3 segment. He was felt to be a candidate  for intravenous tPA which he did receive with excellent results. He had  near-total resolution of his neurologic deficit. The patient was admitted to surgical ICU, followed by Critical Care and  Neurology as well as myself. He continued to improve nicely. MRI of  the brain confirmed left hemispheric ischemic infarct, A3 segment. He  did quite well over the next several days, was ultimately discharged to  home on 2022. CONDITION ON DISCHARGE:  Improved. DISPOSITION:  Home. FINAL DIAGNOSES:  1. Acute ischemic left hemispheric stroke of the left A3 segment,  status post intravenous tPA. 2.  Recent left upper lobectomy for lung cancer. 3.  Peripheral artery disease. 4.  Chronic obstructive pulmonary disease. 5.  Hypothyroidism.   6.  Carotid artery stenosis. 7.  Clinical depression. 8.  History of prostate carcinoma. 9.  Chronic kidney disease. MEDICATIONS ON DISCHARGE:  Include:  1. Aspirin 81 mg daily. 2.  Atorvastatin 40 mg nightly. 3.  Plavix 75 mg daily. 4.  Proscar 5 mg daily. 5.  Remeron 15 mg at bedtime. 6.  Tylenol p.r.n.  7.  Levothyroxine 75 mcg daily. Diagnoses as listed above. Medications as listed above.         Brandon Ayala DO    D: 06/03/2022 16:19:52       T: 06/03/2022 16:22:33     [email or web address]  Job#: [id number]     Doc#: [id number]    CC:

## 2024-10-10 ENCOUNTER — HOSPITAL ENCOUNTER (INPATIENT)
Age: 84
LOS: 13 days | Discharge: NURSING, REHAB OR LONG-TERM CARE | DRG: 040 | End: 2024-10-24
Attending: EMERGENCY MEDICINE | Admitting: INTERNAL MEDICINE
Payer: MEDICARE

## 2024-10-10 ENCOUNTER — APPOINTMENT (OUTPATIENT)
Dept: CT IMAGING | Age: 84
DRG: 040 | End: 2024-10-10
Payer: MEDICARE

## 2024-10-10 ENCOUNTER — APPOINTMENT (OUTPATIENT)
Dept: GENERAL RADIOLOGY | Age: 84
DRG: 040 | End: 2024-10-10
Payer: MEDICARE

## 2024-10-10 DIAGNOSIS — S09.90XA INJURY OF HEAD, INITIAL ENCOUNTER: Primary | ICD-10-CM

## 2024-10-10 DIAGNOSIS — S01.01XA LACERATION OF SCALP, INITIAL ENCOUNTER: ICD-10-CM
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
## 2024-10-10 DIAGNOSIS — I63.9 CEREBROVASCULAR ACCIDENT (CVA), UNSPECIFIED MECHANISM (HCC): ICD-10-CM

## 2024-10-10 DIAGNOSIS — I62.9 INTRACRANIAL HEMORRHAGE (HCC): ICD-10-CM

## 2024-10-10 LAB
ALBUMIN SERPL-MCNC: 4.3 G/DL (ref 3.5–5.2)
ALP SERPL-CCNC: 98 U/L (ref 40–129)
ALT SERPL-CCNC: 9 U/L (ref 0–40)
ANION GAP SERPL CALCULATED.3IONS-SCNC: 10 MMOL/L (ref 7–16)
APAP SERPL-MCNC: <5 UG/ML (ref 10–30)
AST SERPL-CCNC: 19 U/L (ref 0–39)
BASOPHILS # BLD: 0.04 K/UL (ref 0–0.2)
BASOPHILS NFR BLD: 1 % (ref 0–2)
BILIRUB SERPL-MCNC: 0.5 MG/DL (ref 0–1.2)
BUN SERPL-MCNC: 26 MG/DL (ref 6–23)
CALCIUM SERPL-MCNC: 9.6 MG/DL (ref 8.6–10.2)
CHLORIDE SERPL-SCNC: 102 MMOL/L (ref 98–107)
CO2 SERPL-SCNC: 25 MMOL/L (ref 22–29)
CREAT SERPL-MCNC: 1.7 MG/DL (ref 0.7–1.2)
EOSINOPHIL # BLD: 0.07 K/UL (ref 0.05–0.5)
EOSINOPHILS RELATIVE PERCENT: 1 % (ref 0–6)
ERYTHROCYTE [DISTWIDTH] IN BLOOD BY AUTOMATED COUNT: 13.2 % (ref 11.5–15)
ETHANOLAMINE SERPL-MCNC: <10 MG/DL (ref 0–0.08)
GFR, ESTIMATED: 39 ML/MIN/1.73M2
GLUCOSE SERPL-MCNC: 124 MG/DL (ref 74–99)
HCT VFR BLD AUTO: 45.7 % (ref 37–54)
HGB BLD-MCNC: 15.1 G/DL (ref 12.5–16.5)
IMM GRANULOCYTES # BLD AUTO: 0.04 K/UL (ref 0–0.58)
IMM GRANULOCYTES NFR BLD: 1 % (ref 0–5)
INR PPP: 1
LYMPHOCYTES NFR BLD: 1.2 K/UL (ref 1.5–4)
LYMPHOCYTES RELATIVE PERCENT: 17 % (ref 20–42)
MAGNESIUM SERPL-MCNC: 2.1 MG/DL (ref 1.6–2.6)
MCH RBC QN AUTO: 29.5 PG (ref 26–35)
MCHC RBC AUTO-ENTMCNC: 33 G/DL (ref 32–34.5)
MCV RBC AUTO: 89.4 FL (ref 80–99.9)
MONOCYTES NFR BLD: 0.93 K/UL (ref 0.1–0.95)
MONOCYTES NFR BLD: 13 % (ref 2–12)
NEUTROPHILS NFR BLD: 68 % (ref 43–80)
NEUTS SEG NFR BLD: 4.91 K/UL (ref 1.8–7.3)
PLATELET # BLD AUTO: 287 K/UL (ref 130–450)
PMV BLD AUTO: 8.7 FL (ref 7–12)
POTASSIUM SERPL-SCNC: 4.2 MMOL/L (ref 3.5–5)
PROT SERPL-MCNC: 6.6 G/DL (ref 6.4–8.3)
PROTHROMBIN TIME: 10.7 SEC (ref 9.3–12.4)
RBC # BLD AUTO: 5.11 M/UL (ref 3.8–5.8)
SALICYLATES SERPL-MCNC: <0.3 MG/DL (ref 0–30)
SODIUM SERPL-SCNC: 137 MMOL/L (ref 132–146)
TOXIC TRICYCLIC SC,BLOOD: NEGATIVE
TROPONIN I SERPL HS-MCNC: 20 NG/L (ref 0–11)
TROPONIN I SERPL HS-MCNC: 25 NG/L (ref 0–11)
WBC OTHER # BLD: 7.2 K/UL (ref 4.5–11.5)

## 2024-10-10 PROCEDURE — 80143 DRUG ASSAY ACETAMINOPHEN: CPT

## 2024-10-10 PROCEDURE — 71045 X-RAY EXAM CHEST 1 VIEW: CPT

## 2024-10-10 PROCEDURE — 80053 COMPREHEN METABOLIC PANEL: CPT

## 2024-10-10 PROCEDURE — 85384 FIBRINOGEN ACTIVITY: CPT

## 2024-10-10 PROCEDURE — 6360000004 HC RX CONTRAST MEDICATION: Performed by: RADIOLOGY

## 2024-10-10 PROCEDURE — 85576 BLOOD PLATELET AGGREGATION: CPT

## 2024-10-10 PROCEDURE — 72125 CT NECK SPINE W/O DYE: CPT

## 2024-10-10 PROCEDURE — 83735 ASSAY OF MAGNESIUM: CPT

## 2024-10-10 PROCEDURE — 85390 FIBRINOLYSINS SCREEN I&R: CPT

## 2024-10-10 PROCEDURE — 6360000002 HC RX W HCPCS

## 2024-10-10 PROCEDURE — 70498 CT ANGIOGRAPHY NECK: CPT

## 2024-10-10 PROCEDURE — 80307 DRUG TEST PRSMV CHEM ANLYZR: CPT

## 2024-10-10 PROCEDURE — 99285 EMERGENCY DEPT VISIT HI MDM: CPT

## 2024-10-10 PROCEDURE — G0480 DRUG TEST DEF 1-7 CLASSES: HCPCS

## 2024-10-10 PROCEDURE — 85610 PROTHROMBIN TIME: CPT

## 2024-10-10 PROCEDURE — 70496 CT ANGIOGRAPHY HEAD: CPT

## 2024-10-10 PROCEDURE — 96374 THER/PROPH/DIAG INJ IV PUSH: CPT

## 2024-10-10 PROCEDURE — 84484 ASSAY OF TROPONIN QUANT: CPT

## 2024-10-10 PROCEDURE — 93005 ELECTROCARDIOGRAM TRACING: CPT

## 2024-10-10 PROCEDURE — 2580000003 HC RX 258

## 2024-10-10 PROCEDURE — 80179 DRUG ASSAY SALICYLATE: CPT

## 2024-10-10 PROCEDURE — 85347 COAGULATION TIME ACTIVATED: CPT

## 2024-10-10 PROCEDURE — 70450 CT HEAD/BRAIN W/O DYE: CPT

## 2024-10-10 PROCEDURE — 12002 RPR S/N/AX/GEN/TRNK2.6-7.5CM: CPT

## 2024-10-10 PROCEDURE — 85025 COMPLETE CBC W/AUTO DIFF WBC: CPT

## 2024-10-10 RX ORDER — IOPAMIDOL 755 MG/ML
75 INJECTION, SOLUTION INTRAVASCULAR
Status: COMPLETED | OUTPATIENT
Start: 2024-10-10 | End: 2024-10-10

## 2024-10-10 RX ORDER — LEVETIRACETAM 500 MG/5ML
500 INJECTION, SOLUTION, CONCENTRATE INTRAVENOUS EVERY 12 HOURS
Status: DISCONTINUED | OUTPATIENT
Start: 2024-10-10 | End: 2024-10-10

## 2024-10-10 RX ORDER — HYDRALAZINE HYDROCHLORIDE 20 MG/ML
10 INJECTION INTRAMUSCULAR; INTRAVENOUS EVERY 30 MIN PRN
Status: DISCONTINUED | OUTPATIENT
Start: 2024-10-10 | End: 2024-10-12

## 2024-10-10 RX ORDER — 0.9 % SODIUM CHLORIDE 0.9 %
1000 INTRAVENOUS SOLUTION INTRAVENOUS ONCE
Status: COMPLETED | OUTPATIENT
Start: 2024-10-10 | End: 2024-10-11

## 2024-10-10 RX ORDER — LABETALOL HYDROCHLORIDE 5 MG/ML
10 INJECTION, SOLUTION INTRAVENOUS EVERY 30 MIN PRN
Status: DISCONTINUED | OUTPATIENT
Start: 2024-10-10 | End: 2024-10-12

## 2024-10-10 RX ADMIN — LEVETIRACETAM 500 MG: 100 INJECTION INTRAVENOUS at 22:55

## 2024-10-10 RX ADMIN — IOPAMIDOL 75 ML: 755 INJECTION, SOLUTION INTRAVENOUS at 21:19

## 2024-10-10 RX ADMIN — SODIUM CHLORIDE 1000 ML: 9 INJECTION, SOLUTION INTRAVENOUS at 21:59

## 2024-10-11 ENCOUNTER — APPOINTMENT (OUTPATIENT)
Dept: CT IMAGING | Age: 84
DRG: 040 | End: 2024-10-11
Payer: MEDICARE

## 2024-10-11 ENCOUNTER — APPOINTMENT (OUTPATIENT)
Dept: GENERAL RADIOLOGY | Age: 84
DRG: 040 | End: 2024-10-11
Payer: MEDICARE

## 2024-10-11 ENCOUNTER — APPOINTMENT (OUTPATIENT)
Dept: MRI IMAGING | Age: 84
DRG: 040 | End: 2024-10-11
Payer: MEDICARE

## 2024-10-11 PROBLEM — I62.9 INTRACRANIAL HEMORRHAGE (HCC): Status: ACTIVE | Noted: 2024-10-11

## 2024-10-11 LAB
AMPHET UR QL SCN: NEGATIVE
BARBITURATES UR QL SCN: NEGATIVE
BENZODIAZ UR QL: NEGATIVE
BILIRUB UR QL STRIP: NEGATIVE
BUPRENORPHINE UR QL: NEGATIVE
CANNABINOIDS UR QL SCN: NEGATIVE
CLARITY UR: CLEAR
CLOT ANGLE.KAOLIN INDUCED BLD RES TEG: 65.1 DEG (ref 53–70)
COCAINE UR QL SCN: NEGATIVE
COLOR UR: YELLOW
EKG ATRIAL RATE: 98 BPM
EKG P AXIS: 82 DEGREES
EKG P-R INTERVAL: 170 MS
EKG Q-T INTERVAL: 358 MS
EKG QRS DURATION: 94 MS
EKG QTC CALCULATION (BAZETT): 457 MS
EKG R AXIS: 71 DEGREES
EKG T AXIS: 66 DEGREES
EKG VENTRICULAR RATE: 98 BPM
EPL-TEG: 0.5 % (ref 0–15)
FENTANYL UR QL: NEGATIVE
G-TEG: 11.6 KDYN/CM2 (ref 4.5–11)
GLUCOSE UR STRIP-MCNC: NEGATIVE MG/DL
HGB UR QL STRIP.AUTO: ABNORMAL
KETONES UR STRIP-MCNC: 15 MG/DL
KINETICS TEG: 0.8 MIN (ref 1–3)
LEUKOCYTE ESTERASE UR QL STRIP: NEGATIVE
LY30 (LYSIS) TEG: 0.5 % (ref 0–8)
MA (MAX CLOT) TEG: 70 MM (ref 50–70)
METHADONE UR QL: NEGATIVE
NITRITE UR QL STRIP: NEGATIVE
OPIATES UR QL SCN: NEGATIVE
OXYCODONE UR QL SCN: NEGATIVE
PCP UR QL SCN: NEGATIVE
PH UR STRIP: 7.5 [PH] (ref 5–9)
PROT UR STRIP-MCNC: NEGATIVE MG/DL
RBC #/AREA URNS HPF: ABNORMAL /HPF
REACTION TIME TEG: 2.5 MIN (ref 5–10)
SP GR UR STRIP: 1.01 (ref 1–1.03)
TEST INFORMATION: NORMAL
UROBILINOGEN UR STRIP-ACNC: 0.2 EU/DL (ref 0–1)
WBC #/AREA URNS HPF: ABNORMAL /HPF

## 2024-10-11 PROCEDURE — 93010 ELECTROCARDIOGRAM REPORT: CPT | Performed by: INTERNAL MEDICINE

## 2024-10-11 PROCEDURE — 70551 MRI BRAIN STEM W/O DYE: CPT

## 2024-10-11 PROCEDURE — 70450 CT HEAD/BRAIN W/O DYE: CPT

## 2024-10-11 PROCEDURE — 99222 1ST HOSP IP/OBS MODERATE 55: CPT | Performed by: SURGERY

## 2024-10-11 PROCEDURE — 6360000002 HC RX W HCPCS: Performed by: STUDENT IN AN ORGANIZED HEALTH CARE EDUCATION/TRAINING PROGRAM

## 2024-10-11 PROCEDURE — 0JQ00ZZ REPAIR SCALP SUBCUTANEOUS TISSUE AND FASCIA, OPEN APPROACH: ICD-10-PCS | Performed by: EMERGENCY MEDICINE

## 2024-10-11 PROCEDURE — 2060000000 HC ICU INTERMEDIATE R&B

## 2024-10-11 PROCEDURE — 0HQ0XZZ REPAIR SCALP SKIN, EXTERNAL APPROACH: ICD-10-PCS | Performed by: EMERGENCY MEDICINE

## 2024-10-11 PROCEDURE — 73130 X-RAY EXAM OF HAND: CPT

## 2024-10-11 PROCEDURE — 72170 X-RAY EXAM OF PELVIS: CPT

## 2024-10-11 PROCEDURE — 81001 URINALYSIS AUTO W/SCOPE: CPT

## 2024-10-11 PROCEDURE — 80307 DRUG TEST PRSMV CHEM ANLYZR: CPT

## 2024-10-11 PROCEDURE — 6370000000 HC RX 637 (ALT 250 FOR IP): Performed by: INTERNAL MEDICINE

## 2024-10-11 RX ORDER — LEVOTHYROXINE SODIUM 75 UG/1
75 TABLET ORAL DAILY
Status: DISCONTINUED | OUTPATIENT
Start: 2024-10-12 | End: 2024-10-24 | Stop reason: HOSPADM

## 2024-10-11 RX ORDER — LEVOTHYROXINE SODIUM 75 UG/1
75 TABLET ORAL DAILY
COMMUNITY

## 2024-10-11 RX ORDER — CHOLECALCIFEROL (VITAMIN D3) 50 MCG
2000 TABLET ORAL DAILY
Status: DISCONTINUED | OUTPATIENT
Start: 2024-10-12 | End: 2024-10-24 | Stop reason: HOSPADM

## 2024-10-11 RX ORDER — FINASTERIDE 5 MG/1
5 TABLET, FILM COATED ORAL DAILY
COMMUNITY

## 2024-10-11 RX ORDER — FINASTERIDE 5 MG/1
5 TABLET, FILM COATED ORAL DAILY
Status: DISCONTINUED | OUTPATIENT
Start: 2024-10-11 | End: 2024-10-24 | Stop reason: HOSPADM

## 2024-10-11 RX ORDER — CLOPIDOGREL BISULFATE 75 MG/1
75 TABLET ORAL DAILY
Status: ON HOLD | COMMUNITY
End: 2024-10-24 | Stop reason: HOSPADM

## 2024-10-11 RX ORDER — CHOLECALCIFEROL (VITAMIN D3) 50 MCG
2000 TABLET ORAL DAILY
COMMUNITY

## 2024-10-11 RX ADMIN — FINASTERIDE 5 MG: 5 TABLET, FILM COATED ORAL at 20:40

## 2024-10-11 RX ADMIN — HYDRALAZINE HYDROCHLORIDE 10 MG: 20 INJECTION, SOLUTION INTRAMUSCULAR; INTRAVENOUS at 20:40

## 2024-10-11 ASSESSMENT — PAIN - FUNCTIONAL ASSESSMENT
PAIN_FUNCTIONAL_ASSESSMENT: NONE - DENIES PAIN

## 2024-10-11 ASSESSMENT — LIFESTYLE VARIABLES
HOW MANY STANDARD DRINKS CONTAINING ALCOHOL DO YOU HAVE ON A TYPICAL DAY: PATIENT DOES NOT DRINK
HOW OFTEN DO YOU HAVE A DRINK CONTAINING ALCOHOL: NEVER

## 2024-10-11 NOTE — ED PROVIDER NOTES
Department of Emergency Medicine   ED Provider Note  Admit Date/RoomTime: 10/10/2024  9:06 PM  ED Room: 11/11          History of Present Illness:  10/10/24, Time: 9:01 PM EDT       Juan J Choudhury is a 83 y.o. male presenting to the ED for AMS and fall. He had a fall about 30 minutes prior to arrival and has since been having AMS. Pt unable to state his name or answer any other questions appropriately. Following some commands with equal strength in all 4 extremities.  Patient without any focal deficits on examination.  He does not recall falling.  EMS notes patient is normally alert and oriented x 3 and every night goes out for a cigarette on the porch and tonight while he was doing this had a fall.  Patient does not recall having any symptoms prior to falling.    Later during ED course, daughters present and does note patient has been off balance and seeming to have declined the past few days.  Notes he was seeming off yesterday and staring off into space a lot.    Additional history obtained from EMS    Review of Systems:     Pertinent positives and negatives are stated within HPI.      --------------------------------------------- PAST HISTORY ---------------------------------------------  Past Medical History:  has no past medical history on file.    Past Surgical History:  has no past surgical history on file.    Social History:      Family History: family history is not on file.     The patient’s home medications have been reviewed.    Allergies: Patient has no known allergies.      ---------------------------------------------------PHYSICAL EXAM--------------------------------------    Physical Exam  Vitals and nursing note reviewed.   Constitutional:       General: He is not in acute distress.     Appearance: Normal appearance.   HENT:      Head: Normocephalic.      Comments: 4 cm Y-shaped laceration to posterior scalp     Mouth/Throat:      Mouth: Mucous membranes are moist.      Pharynx: Oropharynx is

## 2024-10-11 NOTE — PROGRESS NOTES
TRAUMA TERTIARY    Admit Date: 10/10/2024      CC:    Chief Complaint   Patient presents with    Cerebrovascular Accident     (Patient fell backwards, hit head lac back of head, LKW 19:55, +thinners)       Alcohol pre-screening:   How many times in the past year have you had 4-5 or more drinks in a day?  Several  How much do you drink on a daily basis? 2-3 beers     Drug Pre-screening:    How many times in the past year have you used a recreational drug or used a prescription medication for non medical reasons?  No     Mood Prescreening:    During the past two weeks, have you been bothered by little interest or pleasure doing things?  No  During the past two weeks, have you been bothered by feeling down, depressed or hopeless?  No    Subjective:       Repeat CT head stable.  Neurosurgery consulted, appreciate recommendations.  Incidental finding of left parotid hypodense lesion.  Laceration has been repaired with prolene sutures by ED. Patient much better this AM, forming appropriate sentences and speech much more comprehensible. Answering most questions appropriately. GCS 14, 1 off for confusion, AxO x1.       Objective:   Patient Vitals for the past 8 hrs:   BP Pulse Resp SpO2   10/11/24 0232 135/74 84 15 --   10/11/24 0201 (!) 122/98 96 17 --   10/11/24 0150 -- -- -- 98 %   10/11/24 0145 (!) 146/89 86 24 99 %       No intake/output data recorded.  No intake/output data recorded.    Radiology:  XR HAND RIGHT (MIN 3 VIEWS)   Final Result   1.  No acute fracture.      2.  Mild ulnar subluxation of the index finger DIP is of uncertain chronicity.         CT HEAD WO CONTRAST   Final Result   Stable 5 mm intraparenchymal hemorrhage in the left insular cortex. No new   intracranial hemorrhage. No significant mass effect or midline shift.         XR PELVIS (1-2 VIEWS)   Final Result   1.  No acute osseous findings seen about the pelvis or hips.  Normal   alignment.      2.  Mild bilateral hip osteoarthritis.     absent absent normal   Left ankle absent absent normal   Right foot absent absent normal   Left foot absent absent normal         CONSULTS: Neurosurgery      Principal Problem:    Intracranial hemorrhage (HCC)  Resolved Problems:    * No resolved hospital problems. *        Assessment/Plan:     Neuro:     - L IPH: Stable on repeat, neurosurgery consulted, appreciate recommendations, no Keppra secondary to it being an intraparenchymal bleed, monitor neurological exam   -History of vascular dementia   -History of CVA on ASA/plavix: Hold anticoagulation secondary to IPH, MRI brain pending   - AMS: Could be secondary to vascular dementia, stroke, unlikely to be secondary to small IPH.  CV: History of hypertension  Pulm: History of lung cancer status post left upper lobectomy, tobacco abuse  GI: No acute issues. Bowel regimen. Zofran PRN.   Renal: ABHAY -baseline creatinine around 1.3  Endocrine: No acute issues.  MSK: Posterior scalp laceration s/p repair with skin staples, can be removed in 10 days.  Heme: No acute issues.  ID: No acute issues.    Pain/Analgesia: Tylenol  Bowel Regimen: GlycoLax  DVT PPx:  SCDs, no chemical prophylaxis until 48 hours after last stable CT head  GI PPx:  Diet     Code status:  No Order    Disposition: Continue neurological checks, intraparenchymal hemorrhage stable on repeat, neurosurgery following, no further trauma surgery needs, please call back with any questions or concerns    Justyna Vladez DO  Resident, PGY-2  10/11/2024  5:29 AM

## 2024-10-11 NOTE — PROGRESS NOTES
Pt incontinent of urine.  Complete linen change performed and clean diaper placed and warm blankets provided

## 2024-10-11 NOTE — PROGRESS NOTES
Presently alert - appears at his baseline  Afebrile , vs stable  RRR with distant tones   Diminished breath sounds   Neuro exam intact  Discussed everything with family  For MRI

## 2024-10-11 NOTE — CONSULTS
NKA  Medications: asa, plavix, synthroid, proscar, simvastatin, remeron    Review of Systems:  General ROS: negative  Psychological ROS: negative  ENT ROS: negative  Hematological and Lymphatic ROS: negative  Respiratory ROS: negative  Cardiovascular ROS: negative  Gastrointestinal ROS: negative  Genito-Urinary ROS: negative  Musculoskeletal ROS: negative  Neurological ROS: negative     PHYSICAL EXAM:  Vitals:    10/11/24 1405   BP: 125/72   Pulse: 61   Resp: 18   Temp: 98.4 °F (36.9 °C)   SpO2: 98%       Neuro:   GCS 14    Moving all extremities   Reactive, equal pupils  Psychiatric:  Affect confused, Judgement impaired   Alert and oriented to self  Head/Face:    soft tissue injury--small scalp laceration  no bony deformities  Eyes:    Vision/EOM grossly intact  Ears:    no otorrhagia  Nose:     no epistaxis  Mouth:    no intraoral lacerations/ecchymoses  Neck:   semi rigid collar present  no tracheal deviation   no soft tissue injury  Chest:    no deformities  non tender  Lungs:     equal and clear bilateral breath sounds  no use of accessory muscles  Heart:    normal sinus rhythm  warm, well perfused  strong femoral pulses bilaterally  Abdomen:    non distended  non tender  no soft tissue injury  Back:    no soft tissue injury  no deformities  nontender   Musculoskeletal:    Gait not inspected due to patient on trauma bed  RUE:  no soft tissue injury, swelling, or deformity  RLE:  no soft tissue injury, swelling, or deformity  LUE:  no soft tissue injury, swelling, or deformity  LLE:  no soft tissue injury, swelling, or deformity      DIAGNOSTIC/ LAB FINDINGS  I  Independently reviewed the available laboratory studies and diagnostic imaging.    ASSESSMENT:  Left basal ganglia IPH--nontraumatic  Scalp laceration  Multiple medical comorbidities    PLAN:  Admit to medical team  NSGY following  Keep SBP < 140  No other trauma issues, will s/o, please call if needed.        aMggie Rubio MD, MSc,  [facility]  10/11/2024  3:45 PM

## 2024-10-11 NOTE — ED NOTES
Complete linen change, pt cleaned up and new chucks under pt. Pt needs still needs urine sample and was agreeable to a pure wick due to pt being incontinent at times.

## 2024-10-11 NOTE — CONSULTS
TRAUMA CONSULT  Attending/Surgical Resident/Advance Practice Nurse  10/10/2024  11:49 PM    PRIMARY SURVEY    CHIEF COMPLAINT:  Trauma consult.    Patient is an 83-year-old male with history of prior L JOSELYN stroke in May 2022 requiring tPA with no long-term deficits presenting today secondary to fall and acute mental status change.  Patient arrived via EMS, they report his baseline is GCS of 15 LR and oriented x 3.  Per report family states that he went out to smoke and then they found him down on the ground, patient having difficulty with speech.  Unable to answer very many questions during encounter, able to say yes and no but otherwise unable to form sentences.  Follows all commands, no obvious motor deficits.  GCS of 13 currently.  A&O x 0.  No family currently at bedside, unable to reach family via telephone currently therefore history is limited.    Stroke alert called at 2058.  Patient sent for CTA head and neck, CT head, CT cervical spine.  CT head was significant for small left basal ganglia contusion versus intraparenchymal hemorrhage.  CTA head shows moderate stenosis of the M1 of right MCA, moderate stenosis in A2 of left JOSELYN, 60 to 70% stenosis of proximal right ICA, high-grade stenosis of left subclavian artery.     Patient hypertensive on arrival, mildly tachycardic.  Unable to have CODE STATUS discussion.    AIRWAY:   Airway Normal    EMS ETT Absent  Noisy respirations Absent  Retractions: Absent  Vomiting/bleeding: Absent    BREATHING:    Midaxillary breath sound left:  Normal  Midaxillary breath sound right:  Normal    Cough sound intensity:  fair    FiO2:  Room air    SMI unable to complete    CIRCULATION:   Femerol pulse intensity: Strong  Palpebral conjunctiva: Red    Vitals:    10/10/24 2121   BP: (!) 147/86   Pulse: (!) 102   Resp: 18   Temp:    SpO2: 98%       Vitals:    10/10/24 2058 10/10/24 2121   BP: (!) 168/100 (!) 147/86   Pulse: (!) 102 (!) 102   Resp: 17 18   Temp: 97.3 °F (36.3 °C)

## 2024-10-11 NOTE — CONSULTS
Neurosurgery Consult Note      CHIEF COMPLAINT: Reason for neurosurgical consult is left 5 mm intraparenchymal hemorrhage in the  insular cortex    HPI: Patient was seen and evaluated in the emergency room on the gurney he did open his eyes he was following simple commands daughter was at the bedside counseled her extensively greater than 50% of the encounter time answered all her questions reviewed the imaging results with her will be holding any anticoagulants, and start Keppra    No past medical history on file.  No past surgical history on file.  Patient has no known allergies.  Prior to Admission medications    Not on File     No outpatient medications have been marked as taking for the 10/10/24 encounter (Hospital Encounter).     Social History     Socioeconomic History    Marital status:      Spouse name: Not on file    Number of children: Not on file    Years of education: Not on file    Highest education level: Not on file   Occupational History    Not on file   Tobacco Use    Smoking status: Not on file    Smokeless tobacco: Not on file   Substance and Sexual Activity    Alcohol use: Not on file    Drug use: Not on file    Sexual activity: Not on file   Other Topics Concern    Not on file   Social History Narrative    Not on file     Social Determinants of Health     Financial Resource Strain: Not on file   Food Insecurity: Not on file   Transportation Needs: Not on file   Physical Activity: Not on file   Stress: Not on file   Social Connections: Not on file   Intimate Partner Violence: Not on file   Housing Stability: Not on file     No family history on file.    ROS: Complete 10 point ROS was obtained with positives in HPI, otherwise:  Pt denies fevers, denies chills.  Pt denies chest pain, denies SOB, denies nausea, denies vomiting, denies headache.      VITALS/DRAINS:   VITALS:  BP (!) 143/96   Pulse 79   Temp 97.3 °F (36.3 °C) (Oral)   Resp 18   SpO2 99%   24HR INTAKE/OUTPUT:  No intake or      CTA NECK: 1. 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 2. Diminished flow related enhancement in the left vertebral artery and the distal left subclavian artery.  The site of the severe stenosis in the left subclavian artery is obscured by beam hardening artifact. 3. Approximately 60-70% stenosis of the proximal right internal carotid artery. 4. Moderate to severe stenosis of the origin of the right vertebral artery. 5. 1.8 x 1.2 cm hypodense lesion in the right parotid gland. Further evaluation with pre and post-contrast enhanced MRI of the neck recommended. CTA HEAD: 1. Moderate stenosis in the distal M1 segment of the right middle cerebral artery. 2. Moderate stenoses in the A2 segment of the left anterior cerebral artery. 3. No significant stenosis in the posterior circulation.     CTA NECK W CONTRAST    Result Date: 10/10/2024  EXAMINATION: CTA OF THE NECK; CTA OF THE HEAD WITH CONTRAST 10/10/2024 9:01 pm: TECHNIQUE: CTA of the neck was performed with the administration of intravenous contrast. Multiplanar reformatted images are provided for review.  MIP images are provided for review. Stenosis of the internal carotid arteries measured using NASCET criteria. Automated exposure control, iterative reconstruction, and/or weight based adjustment of the mA/kV was utilized to reduce the radiation dose to as low as reasonably achievable.; CTA of the head/brain was performed with the administration of intravenous contrast. Multiplanar reformatted images are provided for review.  MIP images are provided for review. Automated exposure control, iterative reconstruction, and/or weight based adjustment of the mA/kV was utilized to reduce the radiation dose to as low as reasonably achievable. COMPARISON: None. HISTORY: ORDERING SYSTEM PROVIDED HISTORY: AMS + Fall; concern for CVA vs ICH TECHNOLOGIST PROVIDED HISTORY: Reason for exam:->AMS + Fall; concern for CVA vs ICH Has a \"code stroke\" or

## 2024-10-11 NOTE — PLAN OF CARE
Problem: Discharge Planning  Goal: Discharge to home or other facility with appropriate resources  Outcome: Progressing  Flowsheets (Taken 10/11/2024 6699)  Discharge to home or other facility with appropriate resources:   Identify barriers to discharge with patient and caregiver   Refer to discharge planning if patient needs post-hospital services based on physician order or complex needs related to functional status, cognitive ability or social support system   Arrange for needed discharge resources and transportation as appropriate   Arrange for interpreters to assist at discharge as needed   Identify discharge learning needs (meds, wound care, etc)     Problem: Safety - Adult  Goal: Free from fall injury  Outcome: Progressing     Problem: ABCDS Injury Assessment  Goal: Absence of physical injury  Outcome: Progressing

## 2024-10-11 NOTE — ED NOTES
Spoke with MRI and they stated they are going to send for pt now and they will send pt up to 8506b after test.

## 2024-10-12 LAB
LACTATE BLDV-SCNC: 1.4 MMOL/L (ref 0.5–2.2)
PROLACTIN SERPL-MCNC: 8.8 NG/ML

## 2024-10-12 PROCEDURE — 2580000003 HC RX 258: Performed by: PSYCHIATRY & NEUROLOGY

## 2024-10-12 PROCEDURE — 36415 COLL VENOUS BLD VENIPUNCTURE: CPT

## 2024-10-12 PROCEDURE — 6370000000 HC RX 637 (ALT 250 FOR IP): Performed by: INTERNAL MEDICINE

## 2024-10-12 PROCEDURE — 92610 EVALUATE SWALLOWING FUNCTION: CPT

## 2024-10-12 PROCEDURE — C9254 INJECTION, LACOSAMIDE: HCPCS | Performed by: PSYCHIATRY & NEUROLOGY

## 2024-10-12 PROCEDURE — 92523 SPEECH SOUND LANG COMPREHEN: CPT

## 2024-10-12 PROCEDURE — 6370000000 HC RX 637 (ALT 250 FOR IP): Performed by: PSYCHIATRY & NEUROLOGY

## 2024-10-12 PROCEDURE — 99222 1ST HOSP IP/OBS MODERATE 55: CPT | Performed by: PSYCHIATRY & NEUROLOGY

## 2024-10-12 PROCEDURE — 6360000002 HC RX W HCPCS: Performed by: PSYCHIATRY & NEUROLOGY

## 2024-10-12 PROCEDURE — 84146 ASSAY OF PROLACTIN: CPT

## 2024-10-12 PROCEDURE — 83605 ASSAY OF LACTIC ACID: CPT

## 2024-10-12 PROCEDURE — 2060000000 HC ICU INTERMEDIATE R&B

## 2024-10-12 RX ORDER — HYDRALAZINE HYDROCHLORIDE 20 MG/ML
10 INJECTION INTRAMUSCULAR; INTRAVENOUS EVERY 4 HOURS PRN
Status: DISCONTINUED | OUTPATIENT
Start: 2024-10-12 | End: 2024-10-24 | Stop reason: HOSPADM

## 2024-10-12 RX ORDER — LABETALOL HYDROCHLORIDE 5 MG/ML
10 INJECTION, SOLUTION INTRAVENOUS EVERY 4 HOURS PRN
Status: DISCONTINUED | OUTPATIENT
Start: 2024-10-12 | End: 2024-10-24 | Stop reason: HOSPADM

## 2024-10-12 RX ORDER — ASPIRIN 81 MG/1
81 TABLET, CHEWABLE ORAL DAILY
Status: DISCONTINUED | OUTPATIENT
Start: 2024-10-12 | End: 2024-10-24 | Stop reason: HOSPADM

## 2024-10-12 RX ORDER — ATORVASTATIN CALCIUM 80 MG/1
80 TABLET, FILM COATED ORAL NIGHTLY
Status: DISCONTINUED | OUTPATIENT
Start: 2024-10-12 | End: 2024-10-24 | Stop reason: HOSPADM

## 2024-10-12 RX ORDER — AMLODIPINE BESYLATE 5 MG/1
5 TABLET ORAL DAILY
Status: DISCONTINUED | OUTPATIENT
Start: 2024-10-12 | End: 2024-10-24 | Stop reason: HOSPADM

## 2024-10-12 RX ORDER — LEVETIRACETAM 500 MG/5ML
1000 INJECTION, SOLUTION, CONCENTRATE INTRAVENOUS ONCE
Status: COMPLETED | OUTPATIENT
Start: 2024-10-12 | End: 2024-10-12

## 2024-10-12 RX ADMIN — FINASTERIDE 5 MG: 5 TABLET, FILM COATED ORAL at 21:17

## 2024-10-12 RX ADMIN — AMLODIPINE BESYLATE 5 MG: 5 TABLET ORAL at 12:27

## 2024-10-12 RX ADMIN — LEVETIRACETAM 1000 MG: 100 INJECTION INTRAVENOUS at 21:20

## 2024-10-12 RX ADMIN — ASPIRIN 81 MG 81 MG: 81 TABLET ORAL at 21:17

## 2024-10-12 RX ADMIN — LACOSAMIDE 400 MG: 10 INJECTION INTRAVENOUS at 22:17

## 2024-10-12 RX ADMIN — ATORVASTATIN CALCIUM 80 MG: 80 TABLET, FILM COATED ORAL at 21:17

## 2024-10-12 RX ADMIN — LEVOTHYROXINE SODIUM 75 MCG: 0.07 TABLET ORAL at 06:30

## 2024-10-12 RX ADMIN — Medication 2000 UNITS: at 07:47

## 2024-10-12 NOTE — PLAN OF CARE
Problem: Discharge Planning  Goal: Discharge to home or other facility with appropriate resources  10/12/2024 0130 by Demian Li RN  Outcome: Progressing  10/11/2024 1639 by Henri David, RN  Outcome: Progressing  Flowsheets (Taken 10/11/2024 1638)  Discharge to home or other facility with appropriate resources:   Identify barriers to discharge with patient and caregiver   Refer to discharge planning if patient needs post-hospital services based on physician order or complex needs related to functional status, cognitive ability or social support system   Arrange for needed discharge resources and transportation as appropriate   Arrange for interpreters to assist at discharge as needed   Identify discharge learning needs (meds, wound care, etc)     Problem: Safety - Adult  Goal: Free from fall injury  10/12/2024 0130 by Demian Li RN  Outcome: Progressing  10/11/2024 1639 by Henri David, RN  Outcome: Progressing     Problem: ABCDS Injury Assessment  Goal: Absence of physical injury  10/12/2024 0130 by Demian Li RN  Outcome: Progressing  10/11/2024 1639 by Henri David, RN  Outcome: Progressing

## 2024-10-12 NOTE — PROGRESS NOTES
Neurosurg progress note  VITALS:  BP (!) 180/80   Pulse 80   Temp 98 °F (36.7 °C) (Oral)   Resp 18   Ht 1.702 m (5' 7\")   Wt 63.5 kg (140 lb)   SpO2 94%   BMI 21.93 kg/m²   24HR INTAKE/OUTPUT:    Intake/Output Summary (Last 24 hours) at 10/12/2024 0931  Last data filed at 10/12/2024 0846  Gross per 24 hour   Intake 150 ml   Output --   Net 150 ml     MRI BRAIN WO CONTRAST    Result Date: 10/11/2024  EXAMINATION: MRI OF THE BRAIN WITHOUT CONTRAST  10/11/2024 3:39 pm TECHNIQUE: Multiplanar multisequence MRI of the brain was performed without the administration of intravenous contrast. COMPARISON: None. HISTORY: ORDERING SYSTEM PROVIDED HISTORY: Concern for CVA, AMS GCS 14, expressive aphasia, fall with small L IPH TECHNOLOGIST PROVIDED HISTORY: Reason for exam:->Concern for CVA, AMS GCS 14, expressive aphasia, fall with small L IPH What is the sedation requirement?->None What reading provider will be dictating this exam?->CRC FINDINGS: INTRACRANIAL STRUCTURES/VENTRICLES: There is an acute or early subacute infarction extending from the left corona radiata into the superior basal ganglia and the periventricular white matter.  The infarction measures approximately 2.2 x 1.5 cm in the maximal axial plane. There is a small, 5 mm focus of acute or subacute hemorrhage in the left subinsular region.  No mass effect or midline shift.  Moderate generalized cerebral volume loss.  Severe chronic microvascular ischemic changes. No hydrocephalus or extra-axial fluid. ORBITS: Prosthetic lenses are seen in the globes bilaterally.  The orbits are otherwise grossly unremarkable. SINUSES: The visualized paranasal sinuses and mastoid air cells demonstrate no acute abnormality. BONES/SOFT TISSUES: The bone marrow signal intensity appears normal. The soft tissues demonstrate no acute abnormality.     1. Acute or early subacute INFARCTION extending from the left corona radiata into the superior basal ganglia and the periventricular

## 2024-10-12 NOTE — PROGRESS NOTES
Appears at  his baseline to me  No specific complaints  Afebrile with elevated BP  RRR  Diminished breath sounds bilaterally   Neuro stable  Neurology consult pending

## 2024-10-12 NOTE — CONSULTS
Juan J Choudhury is a 83 y.o. with hx of previous strokes including L JOSELYN stroke in 5/22 requiring tPA with no residual deficits now presenting to the ER for fall and acute mental status changes.  CT of the head showed a small left basal ganglia hemorrhage.  CTA head and neck showed moderate stenosis of the right M1, left A2 and 60 to 70% stenosis of the proximal right ICA.  There was high-grade stenosis of the left subclavian.  MRI of the brain showed an acute left subcortical stroke involving the left corona radiata down to the basal ganglia with a small region of hemorrhage involving the left external capsule.   Upon arrival to the ER, the patient was hypertensive with blood pressures up to 180/80's, HR 80.    Unknown if pt was compliant with plavix (listed on home med list).  Wife is not sure, and pt is confused.      Past Medical History:     No past medical history on file.    Past Surgical History:     No past surgical history on file.    Allergies:     Patient has no known allergies.    Medications:     Prior to Admission medications    Medication Sig Start Date End Date Taking? Authorizing Provider   levothyroxine (SYNTHROID) 75 MCG tablet Take 1 tablet by mouth Daily    Haley Toure MD   clopidogrel (PLAVIX) 75 MG tablet Take 1 tablet by mouth daily    Haley Toure MD   finasteride (PROSCAR) 5 MG tablet Take 1 tablet by mouth daily    Haley Toure MD   vitamin D (CHOLECALCIFEROL) 50 MCG (2000 UT) TABS tablet Take 1 tablet by mouth daily    Haley Toure MD       Social History:     As per HPI    Review of Systems:     Review of Systems    All others negative      Family History:     No family history on file.       :   BP (!) 180/67   Pulse 75   Temp 98.6 °F (37 °C) (Oral)   Resp 20   Ht 1.702 m (5' 7\")   Wt 63.5 kg (140 lb)   SpO2 96%   BMI 21.93 kg/m²   General:  Alert and oriented  to person.   Motor:  No focal neuro weakness  Sensory: Grossly intact.  Unable to

## 2024-10-12 NOTE — PROGRESS NOTES
SPEECH/LANGUAGE PATHOLOGY  SPEECH/LANGUAGE/COGNITIVE EVALUATION   and PLAN OF CARE      PATIENT NAME:  Juan J Choudhury  (male)     MRN:  [id number]    :  1940  (83 y.o.)  STATUS:  Inpatient: Room 8506/8506-B    TODAY'S DATE:  10/12/2024  ORDER DATE, DESCRIPTION AND REFERRING PROVIDER :     Margarito Ledesma MD     REASON FOR REFERRAL:  cva  EVALUATING THERAPIST: ZACH Penn    ADMITTING DIAGNOSIS: Intracranial hemorrhage (HCC) [I62.9]  Injury of head, initial encounter [S09.90XA]  Fall, initial encounter [W19.XXXA]  Laceration of scalp, initial encounter [S01.01XA]    VISIT DIAGNOSIS:   Visit Diagnoses         Codes    Injury of head, initial encounter    -  Primary S09.90XA    Fall, initial encounter     W19.XXXA    Laceration of scalp, initial encounter     S01.01XA               SPEECH THERAPY  PLAN OF CARE   The speech therapy  POC is established based on physician order, speech pathology diagnosis and results of clinical assessment     SPEECH PATHOLOGY DIAGNOSIS:    moderate cognitive linguistic impairment- baseline vascular dementia     Speech Pathology intervention is recommended up to 6 times per week for LOS or when goals are met with emphasis on the following:      Conditions Requiring Skilled Therapeutic Intervention for speech, language and/or cognition    Receptive Aphasia  Expressive Aphasia   Cognitive linguistic impairment  Decreased safety awareness  Decreased short term memory  Decreased problem solving skills   Decreased thought organization    Specific Speech Therapy Interventions to Include:   Receptive language training   Expressive language training   Therapeutic tasks for Cognition    Specific instructions for next treatment:     To initiate POC    SHORT/LONG TERM GOALS  Pt will improve orientation to spatial and temporal surroundings with use of external memory aides.  Pt will improve immediate, short term, recent memory during structured and unstructured tasks with 80%    Immediate Recall: Repeated 1/3     Delayed Recall:   Recalled 0/3    Long Term Recall:   Recalled Age    Organization/Problem Solving/Reasoning   Verbal Sequencing:   To be assessed        Verbal Problem solving:   To be assessed          CLINICAL OBSERVATIONS NOTED DURING THE EVALUATION  Latent responses, Inconsistent responses, and Cueing was required                  EDUCATION:   The Speech Language Pathologist (SLP) completed education regarding results of evaluation and that intervention is warranted at this time.  Learner: Patient  Education: Reviewed results and recommendations of this evaluation  Evaluation of Education:  Verbalizes understanding    Evaluation Time includes thorough review of current medical information, gathering information on past medical history/social history and prior level of function, completion of standardized testing/informal observation of tasks, assessment of data and education on plan of care and goals.      CPT code:    22909  eval speech sound lang comprehension      The admitting diagnosis and active problem list, as listed below have been reviewed prior to initiation of this evaluation.        ACTIVE PROBLEM LIST:   Patient Active Problem List   Diagnosis    Intracranial hemorrhage (HCC)       Livia Barton M.A., CCC-SLP

## 2024-10-12 NOTE — PROGRESS NOTES
SPEECH/LANGUAGE PATHOLOGY  CLINICAL ASSESSMENT OF SWALLOWING FUNCTION   and PLAN OF CARE      PATIENT NAME:  Juan J Choudhury  (male)     MRN:  [id number]    :  1940  (83 y.o.)  STATUS:  Inpatient: Room 8506/8506-B    TODAY'S DATE:  10/12/2024  ORDER DATE, DESCRIPTION AND REFERRING PROVIDER:     Margarito Ledesma MD     REASON FOR REFERRAL: cva   EVALUATING THERAPIST: ZACH Penn                 RESULTS:    DYSPHAGIA DIAGNOSIS:   Clinical indicators of mild-moderate oral phase dysphagia       DIET RECOMMENDATIONS:  Soft and bite size consistency solids (IDDSI level 6) with  thin liquids (IDDSI level 0)     FEEDING RECOMMENDATIONS:     Assistance level:  Full assistance is needed during all oral intake      Compensatory strategies recommended: Thorough oral care to prevent colonization of oral bacteria   Upright in bed/ chair as tolerated  Fully alert for all PO      Discussed recommendations with:  patient nurse in person    SPEECH THERAPY  PLAN OF CARE   The dysphagia POC is established based on physician order, dysphagia diagnosis and results of clinical assessment     Skilled SLP intervention for dysphagia management on acute care up to 5 x per week until goals met, pt plateaus in function and/or discharged from hospital    Conditions Requiring Skilled Therapeutic Intervention for dysphagia:    Patient is performing below functional baseline d/t  current acute condition, respiratory compromise, multiple medications, and/or increased dependency upon caregivers.  Impaired initiation of the swallow needed verbal cues  impaired mastication     Specific dysphagia interventions to include:     ongoing mealtime assessment to provide diet modification and compensatory strategy implementation to minimize risk of aspiration associated with PO intake  PO trials of upgraded diet textures with SLP only to determine the least restrictive PO diet     Specific instructions for next treatment:  ongoing PO analysis  General

## 2024-10-12 NOTE — PLAN OF CARE
Problem: Discharge Planning  Goal: Discharge to home or other facility with appropriate resources  10/12/2024 0846 by Henri David RN  Outcome: Progressing  Flowsheets (Taken 10/12/2024 0745)  Discharge to home or other facility with appropriate resources:   Identify barriers to discharge with patient and caregiver   Arrange for needed discharge resources and transportation as appropriate   Identify discharge learning needs (meds, wound care, etc)  10/12/2024 0130 by Demian Li RN  Outcome: Progressing     Problem: Safety - Adult  Goal: Free from fall injury  10/12/2024 0846 by Henri David RN  Outcome: Progressing  Flowsheets (Taken 10/12/2024 0846)  Free From Fall Injury:   Instruct family/caregiver on patient safety   Based on caregiver fall risk screen, instruct family/caregiver to ask for assistance with transferring infant if caregiver noted to have fall risk factors  10/12/2024 0130 by Demian Li RN  Outcome: Progressing     Problem: ABCDS Injury Assessment  Goal: Absence of physical injury  10/12/2024 0846 by Henri David RN  Outcome: Progressing  Flowsheets (Taken 10/12/2024 0846)  Absence of Physical Injury: Implement safety measures based on patient assessment  10/12/2024 0130 by Demian Li RN  Outcome: Progressing     Problem: Skin/Tissue Integrity  Goal: Absence of new skin breakdown  Description: 1.  Monitor for areas of redness and/or skin breakdown  2.  Assess vascular access sites hourly  3.  Every 4-6 hours minimum:  Change oxygen saturation probe site  4.  Every 4-6 hours:  If on nasal continuous positive airway pressure, respiratory therapy assess nares and determine need for appliance change or resting period.  Outcome: Progressing

## 2024-10-13 LAB
ALBUMIN SERPL-MCNC: 3.8 G/DL (ref 3.5–5.2)
ALP SERPL-CCNC: 81 U/L (ref 40–129)
ALT SERPL-CCNC: 8 U/L (ref 0–40)
ANION GAP SERPL CALCULATED.3IONS-SCNC: 11 MMOL/L (ref 7–16)
AST SERPL-CCNC: 16 U/L (ref 0–39)
BASOPHILS # BLD: 0 K/UL (ref 0–0.2)
BASOPHILS NFR BLD: 0 % (ref 0–2)
BILIRUB SERPL-MCNC: 0.8 MG/DL (ref 0–1.2)
BUN SERPL-MCNC: 22 MG/DL (ref 6–23)
CALCIUM SERPL-MCNC: 9.1 MG/DL (ref 8.6–10.2)
CHLORIDE SERPL-SCNC: 103 MMOL/L (ref 98–107)
CHOLEST SERPL-MCNC: 158 MG/DL
CO2 SERPL-SCNC: 25 MMOL/L (ref 22–29)
CREAT SERPL-MCNC: 1.3 MG/DL (ref 0.7–1.2)
EOSINOPHIL # BLD: 0.27 K/UL (ref 0.05–0.5)
EOSINOPHILS RELATIVE PERCENT: 4 % (ref 0–6)
ERYTHROCYTE [DISTWIDTH] IN BLOOD BY AUTOMATED COUNT: 13.1 % (ref 11.5–15)
GFR, ESTIMATED: 56 ML/MIN/1.73M2
GLUCOSE SERPL-MCNC: 100 MG/DL (ref 74–99)
HCT VFR BLD AUTO: 45.1 % (ref 37–54)
HDLC SERPL-MCNC: 48 MG/DL
HGB BLD-MCNC: 15 G/DL (ref 12.5–16.5)
LDLC SERPL CALC-MCNC: 86 MG/DL
LYMPHOCYTES NFR BLD: 0.34 K/UL (ref 1.5–4)
LYMPHOCYTES RELATIVE PERCENT: 4 % (ref 20–42)
MCH RBC QN AUTO: 29.5 PG (ref 26–35)
MCHC RBC AUTO-ENTMCNC: 33.3 G/DL (ref 32–34.5)
MCV RBC AUTO: 88.8 FL (ref 80–99.9)
MONOCYTES NFR BLD: 0.34 K/UL (ref 0.1–0.95)
MONOCYTES NFR BLD: 4 % (ref 2–12)
NEUTROPHILS NFR BLD: 88 % (ref 43–80)
NEUTS SEG NFR BLD: 6.75 K/UL (ref 1.8–7.3)
PLATELET # BLD AUTO: 247 K/UL (ref 130–450)
PMV BLD AUTO: 8.8 FL (ref 7–12)
POTASSIUM SERPL-SCNC: 3.5 MMOL/L (ref 3.5–5)
PROT SERPL-MCNC: 5.7 G/DL (ref 6.4–8.3)
RBC # BLD AUTO: 5.08 M/UL (ref 3.8–5.8)
RBC # BLD: ABNORMAL 10*6/UL
SODIUM SERPL-SCNC: 139 MMOL/L (ref 132–146)
TRIGL SERPL-MCNC: 119 MG/DL
VLDLC SERPL CALC-MCNC: 24 MG/DL
WBC OTHER # BLD: 7.7 K/UL (ref 4.5–11.5)

## 2024-10-13 PROCEDURE — 2060000000 HC ICU INTERMEDIATE R&B

## 2024-10-13 PROCEDURE — 80061 LIPID PANEL: CPT

## 2024-10-13 PROCEDURE — 97161 PT EVAL LOW COMPLEX 20 MIN: CPT

## 2024-10-13 PROCEDURE — 6370000000 HC RX 637 (ALT 250 FOR IP): Performed by: PSYCHIATRY & NEUROLOGY

## 2024-10-13 PROCEDURE — 80053 COMPREHEN METABOLIC PANEL: CPT

## 2024-10-13 PROCEDURE — 85025 COMPLETE CBC W/AUTO DIFF WBC: CPT

## 2024-10-13 PROCEDURE — 36415 COLL VENOUS BLD VENIPUNCTURE: CPT

## 2024-10-13 PROCEDURE — 97530 THERAPEUTIC ACTIVITIES: CPT

## 2024-10-13 PROCEDURE — 6370000000 HC RX 637 (ALT 250 FOR IP): Performed by: INTERNAL MEDICINE

## 2024-10-13 PROCEDURE — 6360000002 HC RX W HCPCS: Performed by: INTERNAL MEDICINE

## 2024-10-13 PROCEDURE — 99232 SBSQ HOSP IP/OBS MODERATE 35: CPT | Performed by: PSYCHIATRY & NEUROLOGY

## 2024-10-13 RX ORDER — ENOXAPARIN SODIUM 100 MG/ML
40 INJECTION SUBCUTANEOUS DAILY
Status: DISCONTINUED | OUTPATIENT
Start: 2024-10-13 | End: 2024-10-15

## 2024-10-13 RX ORDER — LEVETIRACETAM 500 MG/5ML
500 INJECTION, SOLUTION, CONCENTRATE INTRAVENOUS EVERY 12 HOURS
Status: DISCONTINUED | OUTPATIENT
Start: 2024-10-14 | End: 2024-10-15

## 2024-10-13 RX ADMIN — LEVOTHYROXINE SODIUM 75 MCG: 0.07 TABLET ORAL at 06:35

## 2024-10-13 RX ADMIN — Medication 2000 UNITS: at 07:40

## 2024-10-13 RX ADMIN — ASPIRIN 81 MG 81 MG: 81 TABLET ORAL at 07:40

## 2024-10-13 RX ADMIN — FINASTERIDE 5 MG: 5 TABLET, FILM COATED ORAL at 20:12

## 2024-10-13 RX ADMIN — ATORVASTATIN CALCIUM 80 MG: 80 TABLET, FILM COATED ORAL at 20:12

## 2024-10-13 RX ADMIN — ENOXAPARIN SODIUM 40 MG: 100 INJECTION SUBCUTANEOUS at 20:12

## 2024-10-13 RX ADMIN — AMLODIPINE BESYLATE 5 MG: 5 TABLET ORAL at 07:40

## 2024-10-13 NOTE — PROGRESS NOTES
Physical Therapy  Physical Therapy Initial Assessment     Name: Juan J Choudhury  : 1940  MRN: [id number]      Date of Service: 10/13/2024    Evaluating PT:  Dolores Dominguez PT, DPT [id number]    Room #:  8506/8506-B  Diagnosis:  Intracranial hemorrhage (HCC) [I62.9]  Injury of head, initial encounter [S09.90XA]  Fall, initial encounter [W19.XXXA]  Laceration of scalp, initial encounter [S01.01XA]  PMHx/PSHx:   has no past medical history on file.  Procedure/Surgery:  None this admission  Precautions:  Fall risk, alarms, cognition, Hx of dementia, L IPH, SBP goal <140, soft and bite-sized diet, incontinent  Equipment Needs:  TBD    SUBJECTIVE:    Pt's home setup is unknown. He was unable to provide due to cognitive deficits.    OBJECTIVE:   Initial Evaluation  Date: 10/13/24 Treatment Date:  Short Term/ Long Term   Goals   AM-PAC 6 Clicks      Was pt agreeable to Eval/treatment? Yes     Does pt have pain? Denied     Bed Mobility  Rolling: NT  Supine to sit: ModA  Sit to supine: NT  Scooting: ModA  Rolling: Independent  Supine to sit: Independent  Sit to supine: Independent  Scooting: Independent   Transfers Sit to stand: ModA  Stand to sit: ModA  Stand pivot: ModA with FWW  Sit to stand: Independent  Stand to sit: Independent  Stand pivot: SBA with AAD   Ambulation    70 feet x3 with FWW and ModA  >400 feet with AAD and SBA   Stair negotiation: ascended and descended  NT  TBD   ROM BUE:  Refer to OT  BLE:  WFL     Strength BUE:  Refer to OT  BLE:  Not formally assessed due to difficulty with command follow     Balance Sitting EOB:  Ishaan  Dynamic Standing:  ModA with FWW  Sitting EOB:  Independent  Dynamic Standing:  SBA with AAD     Pt is A & O x 1 (pt responds to name, disoriented to month, year, place, situation; Pt required maximal verbal cueing for safety)  Sensation:  Pt denies numbness and tingling to extremities  Edema:  Unremarkable   Vitals:  SpO2: 97% on RA, HR: 93 bpm (during mobility)    Therapeutic  Exercises:  Dynamic balance activities while sitting EOB for ~15 minutes, STS from EOB x3, STS from chair x2, obstacle negotiation in hallway, multi-directional turns    Patient education  Pt educated on role of PT in acute setting, benefits of upright mobility, use of call light in room, safety, proximity to FWW.    Patient response to education:   Pt verbalized understanding Pt demonstrated skill Pt requires further education in this area   Yes Partially  Reinforce     ASSESSMENT:    Conditions Requiring Skilled Therapeutic Intervention:    [x]Decreased strength     [x]Decreased ROM  [x]Decreased functional mobility  [x]Decreased balance   [x]Decreased endurance   [x]Decreased posture  [x]Decreased sensation  [x]Decreased coordination   [x]Decreased vision  [x]Decreased safety awareness   []Increased pain       Comments:  Pt was supine in bed upon therapist arrival, agreeable to session. Pt was lethargic but cooperative. Patient participated in mobility as documented above. Bed mobility and transfers required heavy assistance due to decreased strength and difficulty with sequencing mobility. Pt's most notable deficits included the following: decreased postural stability, poor proximity to FWW, decreased endurance, bilateral obstacle collisions, and decreased safety awareness. After session, patient was assisted into chair and was left upright with all needs met, chair alarm on, questions answered, and call light within reach.     Treatment:  Patient practiced and was instructed in the following treatment:    Bed mobility - Cues provided for sequencing, physical assist provided as needed.  Transfers - Cues provided for safety, hand/feet placement, physical assist provided as needed.  Ambulation - Cues provided for proximity to FWW, posture, physical assist provided as needed.  Skilled positioning - To prevent skin breakdown and contractures.  Skilled assessment of vitals.  Education - Provided for safety, role of

## 2024-10-13 NOTE — PROGRESS NOTES
DVT Prophylaxis Adjustment Policy (DVT Prophylaxis)     This patient is on DVT Prophylaxis medication that requires a dose adjustment      Date Body Weight IBW  Adjusted BW SCr  CrCl Dialysis status   10/13/2024 63.5 kg (140 lb) Ideal body weight: 66.1 kg (145 lb 11.6 oz) Serum creatinine: 1.3 mg/dL (H) 10/13/24 0533  Estimated creatinine clearance: 39 mL/min (A) N/a       Pharmacy has dose-adjusted the DVT Prophylaxis regimen to match   the recommendations from the following table        Ordered Medication:Lovenox 30mg daily    Order Changed/converted to: Lovenox 40mg daily      These changes were made per protocol according to the [facility] Pharmacist   Review for Appropriate Use and Automatic Dose Adjustments of   Subcutaneous Anticoagulants Policy     *Please note this dose may need readjusted if patient's condition changes.    Please contact pharmacy with any questions regarding these changes.    Angelita Bautista RPH  10/13/2024  1:16 PM

## 2024-10-13 NOTE — PLAN OF CARE
Problem: Discharge Planning  Goal: Discharge to home or other facility with appropriate resources  Outcome: Progressing  Flowsheets (Taken 10/13/2024 3829)  Discharge to home or other facility with appropriate resources:   Identify barriers to discharge with patient and caregiver   Arrange for needed discharge resources and transportation as appropriate   Identify discharge learning needs (meds, wound care, etc)     Problem: Safety - Adult  Goal: Free from fall injury  Outcome: Progressing     Problem: ABCDS Injury Assessment  Goal: Absence of physical injury  Outcome: Progressing

## 2024-10-13 NOTE — PROGRESS NOTES
[facility]   Department of Neurology      Patient:  Juan J Choudhury 83 y.o. male   MRN: [id number]       Date of Service: 10/13/2024    Allergy: Patient has no known allergies.    Subjective     Patient was seen and examined this morning at bedside in no acute distress. Pt slightly better today.  He was up in chair most of today.  Grandkids came to visit and he was able to remember all their names whereas he had been confused for the last few days.      Objective       I & O - 24hr:    Intake/Output Summary (Last 24 hours) at 10/13/2024 1056  Last data filed at 10/12/2024 1430  Gross per 24 hour   Intake 240 ml   Output --   Net 240 ml       Physical Exam  Vitals: BP (!) 152/101   Pulse 70   Temp 97.1 °F (36.2 °C) (Temporal)   Resp 18   Ht 1.702 m (5' 7\")   Wt 63.5 kg (140 lb)   SpO2 99%   BMI 21.93 kg/m²     I & O - 24hr: No intake/output data recorded.   General:  Very lethargic after being up all day   Motor:  No focal neuro weakness  Sensory: Grossly intact.      Reflexes:  Unremarkable  Cerebellar:  FTN intact  Gait:  Deferred    Medications     Continuous Infusions:  Scheduled Meds:   amLODIPine  5 mg Oral Daily    aspirin  81 mg Oral Daily    atorvastatin  80 mg Oral Nightly    finasteride  5 mg Oral Daily    levothyroxine  75 mcg Oral Daily    vitamin D  2,000 Units Oral Daily     PRN Meds: hydrALAZINE, labetalol      Labs and Imaging Studies     Labs    reviewed      Imaging Studies:    MRI BRAIN WO CONTRAST   Final Result   1. Acute or early subacute INFARCTION extending from the left corona radiata   into the superior basal ganglia and the periventricular white matter.   2. Small, 5 mm focus of acute or subacute HEMORRHAGE in the left subinsular   region.   3. No mass effect or midline shift.   4. Moderate generalized cerebral volume loss.   5. Severe chronic microvascular ischemic changes.         XR HAND RIGHT (MIN 3 VIEWS)   Final Result   1.  No acute fracture.      2.  Mild ulnar  qd.  Patient was not a lytic nor EVT candidate given presentation outside the respective windows.     IMPRESSION:   Acute left BG/corona radiata stroke with small focus of round hemorrhage   Acute encephalopathy, delayed responses, blank stares associated with  intermittent RUE shaking -> r/o seizure 2ndy to irritative effects of ICH near left temporal insula.   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  5.    Scattered atherosclerotic disease with asymptomatic proximal right ICA 60-70% stenosis and severe L SCL stenosis, moderate right M1, L A2 stenosis     PLAN:  Start bASA for now.   Repeat CT head in 2-3 days -> if ICH is stable and blood resorbed, change bASA to plavix OR brilinta (if pt was NOT compliant on plavix, then change ASA to plavix; if he failed plavix, change ASA to brilinta)        *  once AMS improves, will re-discuss whether he had been taking plavix regularly.         *  This small ICH is NOT a primary hemorrhage as with amyloid angiopathy, but either from hemorrhagic transformation of an ischemic stroke or a hypertensive hemorrhage or related to pt's fall.  Thus antiplatelet agents are NOT contra-indicated since preventing an ischemic stroke to begin with, and treating the hypertension would prevent this secondary ICH.      TTE with bubble   Fasting lipids, HbA1c, TSH   RF modification - BP, lipids, glu   High intensity statin -> maximize   PT/OT as clinically indicated   Stroke education   IV Keppra 500 mg BID until blood resorbs and back to MS baseline     VTE ppx        ACTION PTS:   Check CT in 2-3 days, and consider changing bASA to plavix or brilinta   bASA for now and statin   Assess MS changes

## 2024-10-13 NOTE — PROGRESS NOTES
Comprehensive Nutrition Assessment    Type and Reason for Visit:  Initial, Positive Nutrition Screen    Nutrition Recommendations/Plan:   Modify diet according to SLP recs on 10/12, recommend soft/bite size solids w/ thin liquids ; pt currently on regular diet, ordered on 10/11  Start Ensure BID to promote oral intake and aid in wound healing  Will monitor     Malnutrition Assessment:  Malnutrition Status:  Insufficient data (10/13/24 1414)    Context:  Acute Illness     Findings of the 6 clinical characteristics of malnutrition:  Energy Intake:  Mild decrease in energy intake (Comment)  Weight Loss:  Unable to assess (d/t lack of wt hx per EMR)     Body Fat Loss:  Unable to assess     Muscle Mass Loss:  Unable to assess    Fluid Accumulation:  No significant fluid accumulation     Strength:  Not Performed    Nutrition Assessment:    pt adm d/t AMS/fall w/ scalp laceration and intracranial hemorrhage; no PMhx on file; SLP recommend soft/bite size solids and thin liquids on 10/12, pt remains on regular diet ordered 10/11- recommend diet modified according to SLP recs; will start ONS to promote oral intake and aid in wound healing; will monitor.    Nutrition Related Findings:    I/O WNL; A&OX2 (oriented/disoriented at times); abd WNL; no edema Wound Type: Multiple (traumatic wounds x 2 noted per doc flow)       Current Nutrition Intake & Therapies:    Average Meal Intake: 51-75%  Average Supplements Intake: None Ordered  ADULT DIET; Regular  ADULT ORAL NUTRITION SUPPLEMENT; Breakfast, Lunch; Standard High Calorie/High Protein Oral Supplement    Anthropometric Measures:  Height: 170.2 cm (5' 7.01\")  Ideal Body Weight (IBW): 148 lbs (67 kg)       Current Body Weight: 63.5 kg (139 lb 15.9 oz) (10/11-no method), 94.6 % IBW.    Current BMI (kg/m2): 21.9  Usual Body Weight:  (NATE d/t lack of wt hx per EMR)     Weight Adjustment For: No Adjustment                 BMI Categories: Underweight (BMI less than 22) age over

## 2024-10-14 ENCOUNTER — APPOINTMENT (OUTPATIENT)
Age: 84
DRG: 040 | End: 2024-10-14
Payer: MEDICARE

## 2024-10-14 PROBLEM — S09.90XA HEAD INJURY: Status: ACTIVE | Noted: 2024-10-14

## 2024-10-14 LAB
ALBUMIN SERPL-MCNC: 3.7 G/DL (ref 3.5–5.2)
ALP SERPL-CCNC: 84 U/L (ref 40–129)
ALT SERPL-CCNC: 8 U/L (ref 0–40)
ANION GAP SERPL CALCULATED.3IONS-SCNC: 11 MMOL/L (ref 7–16)
AST SERPL-CCNC: 16 U/L (ref 0–39)
BASOPHILS # BLD: 0.01 K/UL (ref 0–0.2)
BASOPHILS NFR BLD: 0 % (ref 0–2)
BILIRUB SERPL-MCNC: 0.8 MG/DL (ref 0–1.2)
BUN SERPL-MCNC: 21 MG/DL (ref 6–23)
CALCIUM SERPL-MCNC: 9.2 MG/DL (ref 8.6–10.2)
CHLORIDE SERPL-SCNC: 103 MMOL/L (ref 98–107)
CO2 SERPL-SCNC: 24 MMOL/L (ref 22–29)
CREAT SERPL-MCNC: 1.2 MG/DL (ref 0.7–1.2)
ECHO AO ASC DIAM: 3.5 CM
ECHO AO ASCENDING AORTA INDEX: 2.01 CM/M2
ECHO AV AREA PEAK VELOCITY: 2.7 CM2
ECHO AV AREA VTI: 2.6 CM2
ECHO AV AREA/BSA PEAK VELOCITY: 1.6 CM2/M2
ECHO AV AREA/BSA VTI: 1.5 CM2/M2
ECHO AV CUSP MM: 2 CM
ECHO AV MEAN GRADIENT: 2 MMHG
ECHO AV MEAN VELOCITY: 0.6 M/S
ECHO AV PEAK GRADIENT: 4 MMHG
ECHO AV PEAK VELOCITY: 1 M/S
ECHO AV VELOCITY RATIO: 0.9
ECHO AV VTI: 15.9 CM
ECHO BSA: 1.73 M2
ECHO LA DIAMETER INDEX: 1.78 CM/M2
ECHO LA DIAMETER: 3.1 CM
ECHO LA VOL A-L A2C: 18 ML (ref 18–58)
ECHO LA VOL A-L A4C: 22 ML (ref 18–58)
ECHO LA VOL MOD A2C: 18 ML (ref 18–58)
ECHO LA VOL MOD A4C: 23 ML (ref 18–58)
ECHO LA VOLUME AREA LENGTH: 21 ML
ECHO LA VOLUME INDEX A-L A2C: 10 ML/M2 (ref 16–34)
ECHO LA VOLUME INDEX A-L A4C: 13 ML/M2 (ref 16–34)
ECHO LA VOLUME INDEX AREA LENGTH: 12 ML/M2 (ref 16–34)
ECHO LA VOLUME INDEX MOD A2C: 10 ML/M2 (ref 16–34)
ECHO LA VOLUME INDEX MOD A4C: 13 ML/M2 (ref 16–34)
ECHO LV EDV A2C: 65 ML
ECHO LV EDV A4C: 64 ML
ECHO LV EDV BP: 64 ML (ref 67–155)
ECHO LV EDV INDEX A4C: 37 ML/M2
ECHO LV EDV INDEX BP: 37 ML/M2
ECHO LV EDV NDEX A2C: 37 ML/M2
ECHO LV EF PHYSICIAN: 55 %
ECHO LV EJECTION FRACTION A2C: 51 %
ECHO LV EJECTION FRACTION A4C: 60 %
ECHO LV EJECTION FRACTION BIPLANE: 55 % (ref 55–100)
ECHO LV ESV A2C: 32 ML
ECHO LV ESV A4C: 26 ML
ECHO LV ESV BP: 29 ML (ref 22–58)
ECHO LV ESV INDEX A2C: 18 ML/M2
ECHO LV ESV INDEX A4C: 15 ML/M2
ECHO LV ESV INDEX BP: 17 ML/M2
ECHO LV FRACTIONAL SHORTENING: 23 % (ref 28–44)
ECHO LV INTERNAL DIMENSION DIASTOLE INDEX: 2.01 CM/M2
ECHO LV INTERNAL DIMENSION DIASTOLIC: 3.5 CM (ref 4.2–5.9)
ECHO LV INTERNAL DIMENSION SYSTOLIC INDEX: 1.55 CM/M2
ECHO LV INTERNAL DIMENSION SYSTOLIC: 2.7 CM
ECHO LV ISOVOLUMETRIC RELAXATION TIME (IVRT): 57.7 MS
ECHO LV IVSD: 1.4 CM (ref 0.6–1)
ECHO LV IVSS: 1.8 CM
ECHO LV MASS 2D: 163.2 G (ref 88–224)
ECHO LV MASS INDEX 2D: 93.8 G/M2 (ref 49–115)
ECHO LV POSTERIOR WALL DIASTOLIC: 1.3 CM (ref 0.6–1)
ECHO LV POSTERIOR WALL SYSTOLIC: 1.6 CM
ECHO LV RELATIVE WALL THICKNESS RATIO: 0.74
ECHO LVOT AREA: 2.8 CM2
ECHO LVOT AV VTI INDEX: 0.92
ECHO LVOT DIAM: 1.9 CM
ECHO LVOT MEAN GRADIENT: 2 MMHG
ECHO LVOT PEAK GRADIENT: 3 MMHG
ECHO LVOT PEAK VELOCITY: 0.9 M/S
ECHO LVOT STROKE VOLUME INDEX: 23.9 ML/M2
ECHO LVOT SV: 41.7 ML
ECHO LVOT VTI: 14.7 CM
ECHO MV "A" WAVE DURATION: 124.6 MSEC
ECHO MV A VELOCITY: 0.93 M/S
ECHO MV AREA PHT: 2.6 CM2
ECHO MV AREA VTI: 2.4 CM2
ECHO MV E DECELERATION TIME (DT): 324.6 MS
ECHO MV E VELOCITY: 0.44 M/S
ECHO MV E/A RATIO: 0.47
ECHO MV LVOT VTI INDEX: 1.18
ECHO MV MAX VELOCITY: 1 M/S
ECHO MV MEAN GRADIENT: 1 MMHG
ECHO MV MEAN VELOCITY: 0.5 M/S
ECHO MV PEAK GRADIENT: 4 MMHG
ECHO MV PRESSURE HALF TIME (PHT): 83.5 MS
ECHO MV VTI: 17.4 CM
ECHO PVEIN A DURATION: 129.2 MS
ECHO PVEIN A VELOCITY: 0.3 M/S
ECHO PVEIN PEAK D VELOCITY: 0.3 M/S
ECHO PVEIN PEAK S VELOCITY: 0.5 M/S
ECHO PVEIN S/D RATIO: 1.7
ECHO RV INTERNAL DIMENSION: 3.1 CM
EOSINOPHIL # BLD: 0.1 K/UL (ref 0.05–0.5)
EOSINOPHILS RELATIVE PERCENT: 2 % (ref 0–6)
ERYTHROCYTE [DISTWIDTH] IN BLOOD BY AUTOMATED COUNT: 12.7 % (ref 11.5–15)
GFR, ESTIMATED: 60 ML/MIN/1.73M2
GLUCOSE SERPL-MCNC: 99 MG/DL (ref 74–99)
HCT VFR BLD AUTO: 44.2 % (ref 37–54)
HGB BLD-MCNC: 14.6 G/DL (ref 12.5–16.5)
IMM GRANULOCYTES # BLD AUTO: <0.03 K/UL (ref 0–0.58)
IMM GRANULOCYTES NFR BLD: 0 % (ref 0–5)
LYMPHOCYTES NFR BLD: 0.7 K/UL (ref 1.5–4)
LYMPHOCYTES RELATIVE PERCENT: 13 % (ref 20–42)
MCH RBC QN AUTO: 29.3 PG (ref 26–35)
MCHC RBC AUTO-ENTMCNC: 33 G/DL (ref 32–34.5)
MCV RBC AUTO: 88.6 FL (ref 80–99.9)
MONOCYTES NFR BLD: 0.56 K/UL (ref 0.1–0.95)
MONOCYTES NFR BLD: 10 % (ref 2–12)
NEUTROPHILS NFR BLD: 75 % (ref 43–80)
NEUTS SEG NFR BLD: 4.21 K/UL (ref 1.8–7.3)
PLATELET # BLD AUTO: 232 K/UL (ref 130–450)
PMV BLD AUTO: 8.9 FL (ref 7–12)
POTASSIUM SERPL-SCNC: 3.7 MMOL/L (ref 3.5–5)
PROT SERPL-MCNC: 5.9 G/DL (ref 6.4–8.3)
RBC # BLD AUTO: 4.99 M/UL (ref 3.8–5.8)
SODIUM SERPL-SCNC: 138 MMOL/L (ref 132–146)
WBC OTHER # BLD: 5.6 K/UL (ref 4.5–11.5)

## 2024-10-14 PROCEDURE — 2060000000 HC ICU INTERMEDIATE R&B

## 2024-10-14 PROCEDURE — 93306 TTE W/DOPPLER COMPLETE: CPT | Performed by: INTERNAL MEDICINE

## 2024-10-14 PROCEDURE — 6370000000 HC RX 637 (ALT 250 FOR IP): Performed by: INTERNAL MEDICINE

## 2024-10-14 PROCEDURE — 6360000002 HC RX W HCPCS: Performed by: PSYCHIATRY & NEUROLOGY

## 2024-10-14 PROCEDURE — 6360000004 HC RX CONTRAST MEDICATION

## 2024-10-14 PROCEDURE — 80053 COMPREHEN METABOLIC PANEL: CPT

## 2024-10-14 PROCEDURE — 85025 COMPLETE CBC W/AUTO DIFF WBC: CPT

## 2024-10-14 PROCEDURE — 99231 SBSQ HOSP IP/OBS SF/LOW 25: CPT | Performed by: PHYSICIAN ASSISTANT

## 2024-10-14 PROCEDURE — 93306 TTE W/DOPPLER COMPLETE: CPT

## 2024-10-14 PROCEDURE — 36415 COLL VENOUS BLD VENIPUNCTURE: CPT

## 2024-10-14 PROCEDURE — 6360000002 HC RX W HCPCS: Performed by: INTERNAL MEDICINE

## 2024-10-14 PROCEDURE — 6370000000 HC RX 637 (ALT 250 FOR IP): Performed by: PSYCHIATRY & NEUROLOGY

## 2024-10-14 RX ADMIN — ENOXAPARIN SODIUM 40 MG: 100 INJECTION SUBCUTANEOUS at 09:00

## 2024-10-14 RX ADMIN — LEVOTHYROXINE SODIUM 75 MCG: 0.07 TABLET ORAL at 06:55

## 2024-10-14 RX ADMIN — LEVETIRACETAM 500 MG: 100 INJECTION INTRAVENOUS at 08:01

## 2024-10-14 RX ADMIN — AMLODIPINE BESYLATE 5 MG: 5 TABLET ORAL at 08:00

## 2024-10-14 RX ADMIN — LEVETIRACETAM 500 MG: 100 INJECTION INTRAVENOUS at 20:17

## 2024-10-14 RX ADMIN — ASPIRIN 81 MG 81 MG: 81 TABLET ORAL at 08:00

## 2024-10-14 RX ADMIN — Medication 2000 UNITS: at 08:00

## 2024-10-14 NOTE — PROGRESS NOTES
Neurosurg progress note  VITALS:  /89   Pulse 87   Temp 99 °F (37.2 °C) (Oral)   Resp 20   Ht 1.702 m (5' 7\")   Wt 63.5 kg (140 lb)   SpO2 96%   BMI 21.93 kg/m²   24HR INTAKE/OUTPUT:  No intake or output data in the 24 hours ending 10/14/24 1509  MRI BRAIN WO CONTRAST    Result Date: 10/11/2024  EXAMINATION: MRI OF THE BRAIN WITHOUT CONTRAST  10/11/2024 3:39 pm TECHNIQUE: Multiplanar multisequence MRI of the brain was performed without the administration of intravenous contrast. COMPARISON: None. HISTORY: ORDERING SYSTEM PROVIDED HISTORY: Concern for CVA, AMS GCS 14, expressive aphasia, fall with small L IPH TECHNOLOGIST PROVIDED HISTORY: Reason for exam:->Concern for CVA, AMS GCS 14, expressive aphasia, fall with small L IPH What is the sedation requirement?->None What reading provider will be dictating this exam?->CRC FINDINGS: INTRACRANIAL STRUCTURES/VENTRICLES: There is an acute or early subacute infarction extending from the left corona radiata into the superior basal ganglia and the periventricular white matter.  The infarction measures approximately 2.2 x 1.5 cm in the maximal axial plane. There is a small, 5 mm focus of acute or subacute hemorrhage in the left subinsular region.  No mass effect or midline shift.  Moderate generalized cerebral volume loss.  Severe chronic microvascular ischemic changes. No hydrocephalus or extra-axial fluid. ORBITS: Prosthetic lenses are seen in the globes bilaterally.  The orbits are otherwise grossly unremarkable. SINUSES: The visualized paranasal sinuses and mastoid air cells demonstrate no acute abnormality. BONES/SOFT TISSUES: The bone marrow signal intensity appears normal. The soft tissues demonstrate no acute abnormality.     1. Acute or early subacute INFARCTION extending from the left corona radiata into the superior basal ganglia and the periventricular white matter. 2. Small, 5 mm focus of acute or subacute HEMORRHAGE in the left subinsular region. 3.

## 2024-10-14 NOTE — CARE COORDINATION
Here for Intracranial hemorrhage. Neurosurgery  neurology consult.  Echo (being done now) PT OT Met with  pt who currently is a&o x1. Daughter  Kimmie @ bedside Discussed with her role/transition of care. He lives with hs wife in 1 story home and  also her brother lives with them alos.  [facility]  [address]  Has had [facility] - [facility] and no mary. History. Daughter says he should be here under his [facility]. Balta Ruelas  @ [facility] of admission and Ramon angel for [facility]. Also call placed Devin ashraf @ [facility] and sent email cm escalation that he is .Electronically signed by Melissa Farias RN on 10/14/2024 at 1:43 PM

## 2024-10-14 NOTE — PROGRESS NOTES
[facility]   Department of Neurology      Patient:  Juan J Choudhury 83 y.o. male   MRN: [id number]       Date of Service: 10/14/2024    Allergy: Patient has no known allergies.    Subjective     Patient was seen and examined this morning at bedside in no acute distress.  Patient is lethargic but mildly cooperative with exam.  No family is at bedside    Objective       I & O - 24hr:  No intake or output data in the 24 hours ending 10/14/24 0845      Physical Exam  Vitals: /72   Pulse 76   Temp 98.4 °F (36.9 °C) (Oral)   Resp 18   Ht 1.702 m (5' 7.01\")   Wt 63.5 kg (140 lb)   SpO2 98%   BMI 21.92 kg/m²     I & O - 24hr: No intake/output data recorded.   General:  Very lethargic and sleeping arrival, able to be awaken with calling his name. Very limited cooperation with exam as he falls back to sleep.  Only intermittently following command  CN: PERRLA  Motor:  No focal neuro weakness, normal bulk and tone, no abnormal movement  Sensory: Grossly intact.      Reflexes:  Unremarkable  Cerebellar: Unable to perform as patient is not following commands  Gait:  Deferred    Medications     Continuous Infusions:  Scheduled Meds:   enoxaparin  40 mg SubCUTAneous Daily    levETIRAcetam  500 mg IntraVENous Q12H    amLODIPine  5 mg Oral Daily    aspirin  81 mg Oral Daily    atorvastatin  80 mg Oral Nightly    finasteride  5 mg Oral Daily    levothyroxine  75 mcg Oral Daily    vitamin D  2,000 Units Oral Daily     PRN Meds: hydrALAZINE, labetalol      Labs and Imaging Studies     Labs    reviewed      Imaging Studies:    MRI BRAIN WO CONTRAST   Final Result   1. Acute or early subacute INFARCTION extending from the left corona radiata   into the superior basal ganglia and the periventricular white matter.   2. Small, 5 mm focus of acute or subacute HEMORRHAGE in the left subinsular   region.   3. No mass effect or midline shift.   4. Moderate generalized cerebral volume loss.   5. Severe chronic

## 2024-10-14 NOTE — PLAN OF CARE
Problem: Discharge Planning  Goal: Discharge to home or other facility with appropriate resources  10/14/2024 1004 by Henri David RN  Outcome: Progressing  Flowsheets (Taken 10/14/2024 0745)  Discharge to home or other facility with appropriate resources:   Identify barriers to discharge with patient and caregiver   Arrange for needed discharge resources and transportation as appropriate   Identify discharge learning needs (meds, wound care, etc)  10/14/2024 0031 by Jamal Lowe RN  Outcome: Progressing     Problem: Safety - Adult  Goal: Free from fall injury  10/14/2024 1004 by Henri David RN  Outcome: Progressing  Flowsheets (Taken 10/14/2024 1004)  Free From Fall Injury:   Instruct family/caregiver on patient safety   Based on caregiver fall risk screen, instruct family/caregiver to ask for assistance with transferring infant if caregiver noted to have fall risk factors  10/14/2024 0031 by Jamal Lowe RN  Outcome: Progressing     Problem: ABCDS Injury Assessment  Goal: Absence of physical injury  10/14/2024 1004 by Henri David RN  Outcome: Progressing  Flowsheets (Taken 10/14/2024 1004)  Absence of Physical Injury: Implement safety measures based on patient assessment  10/14/2024 0031 by Jamal Lowe RN  Outcome: Progressing     Problem: Neurosensory - Adult  Goal: Achieves stable or improved neurological status  10/14/2024 1004 by Henri David RN  Outcome: Progressing  Flowsheets (Taken 10/14/2024 0745)  Achieves stable or improved neurological status:   Assess for and report changes in neurological status   Initiate measures to prevent increased intracranial pressure  10/14/2024 0031 by Jamal Lowe RN  Outcome: Progressing  Goal: Absence of seizures  10/14/2024 1004 by Henri David RN  Outcome: Progressing  Flowsheets (Taken 10/14/2024 0745)  Absence of seizures:   Monitor for seizure activity.  If seizure occurs, document type and location of movements and

## 2024-10-14 NOTE — H&P
[facility]              [address] [place] [address]                           HISTORY & PHYSICAL      PATIENT NAME: GHADA YATES                 : 1940  MED REC NO: [id number]                        ROOM: [facility]  ACCOUNT NO: [id number]                       ADMIT DATE: 10/10/2024  PROVIDER: Margarito Ledesma MD      CHIEF COMPLAINT:  Intracranial hemorrhage.    HISTORY OF PRESENT ILLNESS:  The patient is an 83-year-old male with a complicated past medical history including but not limited to acute ischemic left hemispheric stroke, status post intravenous tPA on 2022, vascular dementia, history of lung cancer with subsequent laparoscopic resection at the [facility], active smoker, carotid artery stenosis, peripheral vascular disease, chronic obstructive pulmonary disease, chronic kidney disease, prostatic carcinoma, hypothyroidism, who was found outside at his home by his family, lying on his back, unresponsive.  It was not exactly clear if he had a syncopal event or tripped and fell, but in any case, he was taken to the emergency room where imaging demonstrated a small area of intracranial hemorrhage.  Concerns were for possible concurrent stroke as well with his past history.  The patient can provide no further details of the events.    MEDICATIONS:  See chart.    PAST MEDICAL HISTORY:  Vascular dementia; lung cancer, status post laparoscopic resection; active smoker; carotid artery stenosis; peripheral vascular disease; chronic obstructive pulmonary disease; Charcot joint, left ankle; chronic kidney disease; prostatic carcinoma; hypothyroidism; status post acute ischemic cerebrovascular infarction in 2024, status post tPA.    SOCIAL HISTORY:  Active smoker.  No alcohol.    FAMILY HISTORY:  Not available.    REVIEW OF SYSTEMS:  He is obtunded and not able to provide review of system.    PHYSICAL EXAMINATION:  GENERAL:  The

## 2024-10-14 NOTE — PLAN OF CARE
Problem: Discharge Planning  Goal: Discharge to home or other facility with appropriate resources  Outcome: Progressing     Problem: Safety - Adult  Goal: Free from fall injury  Outcome: Progressing     Problem: ABCDS Injury Assessment  Goal: Absence of physical injury  Outcome: Progressing     Problem: Skin/Tissue Integrity  Goal: Absence of new skin breakdown  Description: 1.  Monitor for areas of redness and/or skin breakdown  2.  Assess vascular access sites hourly  3.  Every 4-6 hours minimum:  Change oxygen saturation probe site  4.  Every 4-6 hours:  If on nasal continuous positive airway pressure, respiratory therapy assess nares and determine need for appliance change or resting period.  Outcome: Progressing     Problem: Nutrition Deficit:  Goal: Optimize nutritional status  Outcome: Progressing     Problem: Neurosensory - Adult  Goal: Achieves stable or improved neurological status  Outcome: Progressing  Goal: Absence of seizures  Outcome: Progressing  Goal: Achieves maximal functionality and self care  Outcome: Progressing

## 2024-10-14 NOTE — PROGRESS NOTES
Overall improved this AM  More alert with less confusion  Afebrile , vs stable  RRR  Diminished breath sounds   Plan to repeat CT in 1-2 days  Possible change to Plavix or Brilinta

## 2024-10-14 NOTE — PLAN OF CARE
Problem: Discharge Planning  Goal: Discharge to home or other facility with appropriate resources  10/14/2024 1928 by Froylan Thomas RN  Outcome: Progressing  10/14/2024 1004 by Henri David RN  Outcome: Progressing  Flowsheets (Taken 10/14/2024 0745)  Discharge to home or other facility with appropriate resources:   Identify barriers to discharge with patient and caregiver   Arrange for needed discharge resources and transportation as appropriate   Identify discharge learning needs (meds, wound care, etc)     Problem: Safety - Adult  Goal: Free from fall injury  10/14/2024 1928 by Froylan Thomas RN  Outcome: Progressing  10/14/2024 1004 by Henri David RN  Outcome: Progressing  Flowsheets (Taken 10/14/2024 1004)  Free From Fall Injury:   Instruct family/caregiver on patient safety   Based on caregiver fall risk screen, instruct family/caregiver to ask for assistance with transferring infant if caregiver noted to have fall risk factors     Problem: ABCDS Injury Assessment  Goal: Absence of physical injury  10/14/2024 1928 by Froylan Thomas RN  Outcome: Progressing  10/14/2024 1004 by Henri David RN  Outcome: Progressing  Flowsheets (Taken 10/14/2024 1004)  Absence of Physical Injury: Implement safety measures based on patient assessment     Problem: Skin/Tissue Integrity  Goal: Absence of new skin breakdown  Description: 1.  Monitor for areas of redness and/or skin breakdown  2.  Assess vascular access sites hourly  3.  Every 4-6 hours minimum:  Change oxygen saturation probe site  4.  Every 4-6 hours:  If on nasal continuous positive airway pressure, respiratory therapy assess nares and determine need for appliance change or resting period.  10/14/2024 1928 by Froylan Thomas RN  Outcome: Progressing  10/14/2024 1004 by Henri David RN  Outcome: Progressing     Problem: Nutrition Deficit:  Goal: Optimize nutritional status  10/14/2024 1928 by Froylan Thomas RN  Outcome:

## 2024-10-15 ENCOUNTER — APPOINTMENT (OUTPATIENT)
Dept: CT IMAGING | Age: 84
DRG: 040 | End: 2024-10-15
Payer: MEDICARE

## 2024-10-15 PROBLEM — I63.9 ACUTE STROKE DUE TO ISCHEMIA (HCC): Status: ACTIVE | Noted: 2024-10-15

## 2024-10-15 LAB
ALBUMIN SERPL-MCNC: 3.8 G/DL (ref 3.5–5.2)
ALP SERPL-CCNC: 86 U/L (ref 40–129)
ALT SERPL-CCNC: 8 U/L (ref 0–40)
ANION GAP SERPL CALCULATED.3IONS-SCNC: 16 MMOL/L (ref 7–16)
AST SERPL-CCNC: 16 U/L (ref 0–39)
BASOPHILS # BLD: 0.03 K/UL (ref 0–0.2)
BASOPHILS NFR BLD: 0 % (ref 0–2)
BILIRUB SERPL-MCNC: 0.6 MG/DL (ref 0–1.2)
BUN SERPL-MCNC: 23 MG/DL (ref 6–23)
CALCIUM SERPL-MCNC: 9 MG/DL (ref 8.6–10.2)
CHLORIDE SERPL-SCNC: 99 MMOL/L (ref 98–107)
CO2 SERPL-SCNC: 22 MMOL/L (ref 22–29)
CREAT SERPL-MCNC: 1.3 MG/DL (ref 0.7–1.2)
EOSINOPHIL # BLD: 0.06 K/UL (ref 0.05–0.5)
EOSINOPHILS RELATIVE PERCENT: 1 % (ref 0–6)
ERYTHROCYTE [DISTWIDTH] IN BLOOD BY AUTOMATED COUNT: 12.4 % (ref 11.5–15)
GFR, ESTIMATED: 57 ML/MIN/1.73M2
GLUCOSE SERPL-MCNC: 97 MG/DL (ref 74–99)
HBA1C MFR BLD: 5.3 % (ref 4–5.6)
HCT VFR BLD AUTO: 44.3 % (ref 37–54)
HGB BLD-MCNC: 15.1 G/DL (ref 12.5–16.5)
IMM GRANULOCYTES # BLD AUTO: <0.03 K/UL (ref 0–0.58)
IMM GRANULOCYTES NFR BLD: 0 % (ref 0–5)
LYMPHOCYTES NFR BLD: 0.77 K/UL (ref 1.5–4)
LYMPHOCYTES RELATIVE PERCENT: 11 % (ref 20–42)
MCH RBC QN AUTO: 29.6 PG (ref 26–35)
MCHC RBC AUTO-ENTMCNC: 34.1 G/DL (ref 32–34.5)
MCV RBC AUTO: 86.9 FL (ref 80–99.9)
MONOCYTES NFR BLD: 0.69 K/UL (ref 0.1–0.95)
MONOCYTES NFR BLD: 10 % (ref 2–12)
NEUTROPHILS NFR BLD: 77 % (ref 43–80)
NEUTS SEG NFR BLD: 5.19 K/UL (ref 1.8–7.3)
PLATELET # BLD AUTO: 232 K/UL (ref 130–450)
PMV BLD AUTO: 8.6 FL (ref 7–12)
POTASSIUM SERPL-SCNC: 3.8 MMOL/L (ref 3.5–5)
PROT SERPL-MCNC: 5.8 G/DL (ref 6.4–8.3)
RBC # BLD AUTO: 5.1 M/UL (ref 3.8–5.8)
SODIUM SERPL-SCNC: 137 MMOL/L (ref 132–146)
WBC OTHER # BLD: 6.8 K/UL (ref 4.5–11.5)

## 2024-10-15 PROCEDURE — 6370000000 HC RX 637 (ALT 250 FOR IP): Performed by: INTERNAL MEDICINE

## 2024-10-15 PROCEDURE — 6360000002 HC RX W HCPCS: Performed by: NURSE PRACTITIONER

## 2024-10-15 PROCEDURE — 97530 THERAPEUTIC ACTIVITIES: CPT

## 2024-10-15 PROCEDURE — 2060000000 HC ICU INTERMEDIATE R&B

## 2024-10-15 PROCEDURE — 80053 COMPREHEN METABOLIC PANEL: CPT

## 2024-10-15 PROCEDURE — 70450 CT HEAD/BRAIN W/O DYE: CPT

## 2024-10-15 PROCEDURE — 6360000002 HC RX W HCPCS: Performed by: PSYCHIATRY & NEUROLOGY

## 2024-10-15 PROCEDURE — 85025 COMPLETE CBC W/AUTO DIFF WBC: CPT

## 2024-10-15 PROCEDURE — 97167 OT EVAL HIGH COMPLEX 60 MIN: CPT

## 2024-10-15 PROCEDURE — 83036 HEMOGLOBIN GLYCOSYLATED A1C: CPT

## 2024-10-15 PROCEDURE — 99233 SBSQ HOSP IP/OBS HIGH 50: CPT | Performed by: NURSE PRACTITIONER

## 2024-10-15 PROCEDURE — 36415 COLL VENOUS BLD VENIPUNCTURE: CPT

## 2024-10-15 PROCEDURE — 6370000000 HC RX 637 (ALT 250 FOR IP): Performed by: PSYCHIATRY & NEUROLOGY

## 2024-10-15 RX ORDER — LEVETIRACETAM 500 MG/5ML
250 INJECTION, SOLUTION, CONCENTRATE INTRAVENOUS EVERY 12 HOURS
Status: DISCONTINUED | OUTPATIENT
Start: 2024-10-15 | End: 2024-10-24 | Stop reason: HOSPADM

## 2024-10-15 RX ADMIN — AMLODIPINE BESYLATE 5 MG: 5 TABLET ORAL at 07:32

## 2024-10-15 RX ADMIN — LEVETIRACETAM 250 MG: 100 INJECTION INTRAVENOUS at 21:23

## 2024-10-15 RX ADMIN — LEVETIRACETAM 500 MG: 100 INJECTION INTRAVENOUS at 07:32

## 2024-10-15 RX ADMIN — ASPIRIN 81 MG 81 MG: 81 TABLET ORAL at 07:31

## 2024-10-15 RX ADMIN — Medication 2000 UNITS: at 07:31

## 2024-10-15 NOTE — PROGRESS NOTES
Neurosurg progress note  VITALS:  /79   Pulse 93   Temp 98 °F (36.7 °C) (Oral)   Resp 16   Ht 1.702 m (5' 7\")   Wt 63.5 kg (140 lb)   SpO2 96%   BMI 21.93 kg/m²   24HR INTAKE/OUTPUT:  No intake or output data in the 24 hours ending 10/15/24 1244  MRI BRAIN WO CONTRAST    Result Date: 10/11/2024  EXAMINATION: MRI OF THE BRAIN WITHOUT CONTRAST  10/11/2024 3:39 pm TECHNIQUE: Multiplanar multisequence MRI of the brain was performed without the administration of intravenous contrast. COMPARISON: None. HISTORY: ORDERING SYSTEM PROVIDED HISTORY: Concern for CVA, AMS GCS 14, expressive aphasia, fall with small L IPH TECHNOLOGIST PROVIDED HISTORY: Reason for exam:->Concern for CVA, AMS GCS 14, expressive aphasia, fall with small L IPH What is the sedation requirement?->None What reading provider will be dictating this exam?->CRC FINDINGS: INTRACRANIAL STRUCTURES/VENTRICLES: There is an acute or early subacute infarction extending from the left corona radiata into the superior basal ganglia and the periventricular white matter.  The infarction measures approximately 2.2 x 1.5 cm in the maximal axial plane. There is a small, 5 mm focus of acute or subacute hemorrhage in the left subinsular region.  No mass effect or midline shift.  Moderate generalized cerebral volume loss.  Severe chronic microvascular ischemic changes. No hydrocephalus or extra-axial fluid. ORBITS: Prosthetic lenses are seen in the globes bilaterally.  The orbits are otherwise grossly unremarkable. SINUSES: The visualized paranasal sinuses and mastoid air cells demonstrate no acute abnormality. BONES/SOFT TISSUES: The bone marrow signal intensity appears normal. The soft tissues demonstrate no acute abnormality.     1. Acute or early subacute INFARCTION extending from the left corona radiata into the superior basal ganglia and the periventricular white matter. 2. Small, 5 mm focus of acute or subacute HEMORRHAGE in the left subinsular region. 3.     CREATININE 1.3 10/15/2024 04:19 AM    CALCIUM 9.0 10/15/2024 04:19 AM    LABGLOM 57 10/15/2024 04:19 AM    GLUCOSE 97 10/15/2024 04:19 AM      enoxaparin  40 mg SubCUTAneous Daily    levETIRAcetam  500 mg IntraVENous Q12H    amLODIPine  5 mg Oral Daily    aspirin  81 mg Oral Daily    atorvastatin  80 mg Oral Nightly    finasteride  5 mg Oral Daily    levothyroxine  75 mcg Oral Daily    vitamin D  2,000 Units Oral Daily     Demented resting comfortably follows simple commands  Assessment:  Patient Active Problem List   Diagnosis    Intracranial hemorrhage (HCC)    Head injury     Plan: Await family decision on disposition continue current care  Adarsh Cabral MD M.D.

## 2024-10-15 NOTE — PROGRESS NOTES
Speech Language Pathology  NAME:  Juan J Choudhury  :  1940  DATE: 10/15/2024  ROOM:  8506/8506-B    Pt unavailable at 1000 for Clinical Swallow Evaluation Discussed with charge nurse in person     REASON:  Sleeping/ Lethargic- would not awaken for safe/ appropriate SLP assessment.    Will re-attempt as appropriate.       Thank You

## 2024-10-15 NOTE — DISCHARGE INSTR - COC
Dressing/Treatment Open to air 10/15/24 0715   Drainage Amount Scant (moist but unmeasurable) 10/15/24 0715   Number of days: 3       Wound 10/11/24 Hand Posterior;Right (Active)   Drainage Amount None (dry) 10/13/24 2000   Odor None 10/12/24 1945   Number of days: 3        Elimination:  Continence:   Bowel: No  Bladder: No  Urinary Catheter: None   Colostomy/Ileostomy/Ileal Conduit: No       Date of Last BM: 10/22/24  No intake or output data in the 24 hours ending 10/15/24 1309  No intake/output data recorded.    Safety Concerns:     History of Falls (last 30 days) and At Risk for Falls    Impairments/Disabilities:      None    Nutrition Therapy:  Current Nutrition Therapy:   - Oral Diet:  NPO    Routes of Feeding: Gastrostomy Tube  Liquids: No Liquids  Daily Fluid Restriction: no  Last Modified Barium Swallow with Video (Video Swallowing Test): not done    Treatments at the Time of Hospital Discharge:   Respiratory Treatments: na  Oxygen Therapy:  is not on home oxygen therapy.  Ventilator:    - No ventilator support    Rehab Therapies: Physical Therapy, Occupational Therapy, and Speech/Language Therapy  Weight Bearing Status/Restrictions: No weight bearing restrictions  Other Medical Equipment (for information only, NOT a DME order):  wheelchair, walker, and hospital bed  Other Treatments: na    Patient's personal belongings (please select all that are sent with patient):  None    RN SIGNATURE:  Electronically signed by DARREN SCHRADER RN on 10/24/24 at 12:20 PM EDT    CASE MANAGEMENT/SOCIAL WORK SECTION    Inpatient Status Date: ***    Readmission Risk Assessment Score:  Readmission Risk              Risk of Unplanned Readmission:  9           Discharging to Facility/ Agency   Name: [facility]   Address:[address] [address]  Phone:[phone]  Fax:[phone]    Dialysis Facility (if applicable)   Name:  Address:  Dialysis Schedule:  Phone:  Fax:    / signature:  {Esignature:[id number]}    PHYSICIAN SECTION    Prognosis: Good    Condition at Discharge: Stable    Rehab Potential (if transferring to Rehab): Good    Recommended Labs or Other Treatments After Discharge: CXR 4-6 weeks to follow pneumonia and effusions    Physician Certification: I certify the above information and transfer of Juan J Choudhury  is necessary for the continuing treatment of the diagnosis listed and that he requires Skilled Nursing Facility for less 30 days.     Update Admission H&P: No change in H&P    PHYSICIAN SIGNATURE:  Electronically signed by Margarito Ledesma MD on 10/24/24 at 1:20 PM EDT

## 2024-10-15 NOTE — PROGRESS NOTES
Neuro Inpatient Follow Up Note       Juan J Choudhury is a 83 y.o.  male     Neuro is following for AMS and fall    PMH: Prior stroke, vascular dementia, PVD, COPD, CKD, history of prostatic carcinoma, hypothyroidism, active smoker    Patient presented to ED on 10/10 after having a fall about 30 minutes prior to arrival.  After the fall patient was confused.  Patient was unable to state his name or answer any questions appropriately.  He will follow some commands and appeared to have equal strength in all 4 extremities.  Per EMS he was oriented x 3 with them.  Patient goes out to smoke on his porch every night and on the day of arrival patient was doing so when he had a fall.  Per ED documentation, patient's daughter reported being off balance and seeming to decline over the past few days including staring off into space.    CT head shows small left basal ganglia hemorrhage.  CTA head and neck showed moderate stenosis in the right M1, left A2 and 60 to 70% stenosis in the proximal right ICA.  Patient also had high-grade stenosis in the left subclavian.  MRI brain showed an acute left subcortical stroke involving the left corona radiata down to the basal ganglia with small region of hemorrhage involving the left external capsule    Vital signs are stable    There is no family present at bedside current    Objective:     /79   Pulse 93   Temp 98 °F (36.7 °C) (Oral)   Resp 16   Ht 1.702 m (5' 7\")   Wt 63.5 kg (140 lb)   SpO2 96%   BMI 21.93 kg/m²     General appearance: Lethargic, appears stated age, cooperative and no distress  Head: normocephalic, without obvious abnormality, atraumatic  Eyes: conjunctivae/corneas clear. .  Neck:  supple, symmetrical, trachea midline   Lungs: Unlabored breaths on room air  Heart: regular rate and rhythm  Extremities: normal, atraumatic, no cyanosis or edema  Pulses: 2+ and symmetric  Skin: color, texture, turgor normal---no rashes or lesions      Mental Status: Lethargic,  PORTABLE   Final Result   More likely discrete areas of linear residual atelectasis in the left lower   lung base.  No superimposed acute cardiopulmonary process.         CT HEAD WO CONTRAST   Final Result   1. Tiny hyperdense area in the left basal ganglia measuring 0.5 cm. In the   setting of trauma, could be a small contusion. No significant mass effect or   midline shift.   2. Right parietal scalp soft tissue injury.   3. Age related changes of brain volume loss and nonspecific white matter   hypodensity most often ascribed to chronic small vessel ischemia.         CTA HEAD W CONTRAST   Final Result   CTA NECK:      1. 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   2. Diminished flow related enhancement in the left vertebral artery and the   distal left subclavian artery.  The site of the severe stenosis in the left   subclavian artery is obscured by beam hardening artifact.   3. Approximately 60-70% stenosis of the proximal right internal carotid   artery.   4. Moderate to severe stenosis of the origin of the right vertebral artery.   5. 1.8 x 1.2 cm hypodense lesion in the right parotid gland. Further   evaluation with pre and post-contrast enhanced MRI of the neck recommended.   CTA HEAD:      1. Moderate stenosis in the distal M1 segment of the right middle cerebral   artery.   2. Moderate stenoses in the A2 segment of the left anterior cerebral artery.   3. No significant stenosis in the posterior circulation.         CTA NECK W CONTRAST   Final Result   CTA NECK:      1. 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   2. Diminished flow related enhancement in the left vertebral artery and the   distal left subclavian artery.  The site of the severe stenosis in the left   subclavian artery is obscured by beam hardening artifact.   3. Approximately 60-70% stenosis of the proximal right internal carotid   artery.   4. Moderate to severe stenosis of the origin of the

## 2024-10-15 NOTE — PROGRESS NOTES
Very lethargic this AM  Afebrile ., vs stable  RRR with distant tones  Diminished breath sounds   Repeat CT brain today  ? BLAKE

## 2024-10-15 NOTE — PROGRESS NOTES
Physical Therapy  Physical Therapy Treatment    Name: Juan J Choudhury  : 1940  MRN: [id number]      Date of Service: 10/15/2024    Evaluating PT:  Dolores Dominguez PT, DPT [id number]    Room #:  8506/8506-B  Diagnosis:  Intracranial hemorrhage (HCC) [I62.9]  Injury of head, initial encounter [S09.90XA]  Fall, initial encounter [W19.XXXA]  Laceration of scalp, initial encounter [S01.01XA]  PMHx/PSHx:   has no past medical history on file.   has no past surgical history on file.  Procedure/Surgery:  None this admission  Precautions:  Fall risk, alarms, cognition, Hx of dementia, L IPH, incontinent  Equipment Needs:  TBD    SUBJECTIVE:    Pt's home setup is unknown. He was unable to provide due to cognitive deficits.    OBJECTIVE:   Initial Evaluation  Date: 10/13/24 Treatment Date: 10/15/2024 Short Term/ Long Term   Goals   AM-PAC 6 Clicks     Was pt agreeable to Eval/treatment? Yes Yes    Does pt have pain? Denied None reported    Bed Mobility  Rolling: NT  Supine to sit: ModA  Sit to supine: NT  Scooting: ModA Rolling: NT  Supine to sit: MaxA x2  Sit to supine: MaxA x2  Scooting: NT Rolling: Independent  Supine to sit: Independent  Sit to supine: Independent  Scooting: Independent   Transfers Sit to stand: ModA  Stand to sit: ModA  Stand pivot: ModA with FWW Sit to stand: NT  Stand to sit: NT  Stand pivot: NT Sit to stand: Independent  Stand to sit: Independent  Stand pivot: SBA with AAD   Ambulation    70 feet x3 with FWW and ModA NT >400 feet with AAD and SBA   Stair negotiation: ascended and descended  NT NT TBD   ROM BUE:  Refer to OT  BLE:  WFL     Strength BUE:  Refer to OT  BLE:  Not formally assessed due to difficulty with command follow     Balance Sitting EOB:  Ishaan  Dynamic Standing:  ModA with FWW Sitting EOB:  MaxA  Dynamic Standing:  NT Sitting EOB:  Independent  Dynamic Standing:  SBA with AAD     Pt is A & O x 1 (self). Pt minimally verbal during session but able to open his eyes and say his

## 2024-10-15 NOTE — PLAN OF CARE
Problem: Discharge Planning  Goal: Discharge to home or other facility with appropriate resources  10/15/2024 0906 by Henri David RN  Outcome: Progressing  Flowsheets (Taken 10/15/2024 0715)  Discharge to home or other facility with appropriate resources:   Identify discharge learning needs (meds, wound care, etc)   Arrange for needed discharge resources and transportation as appropriate   Identify barriers to discharge with patient and caregiver  10/14/2024 1928 by Froylan Thomas RN  Outcome: Progressing     Problem: Safety - Adult  Goal: Free from fall injury  10/15/2024 0906 by Henri David RN  Outcome: Progressing  Flowsheets (Taken 10/15/2024 0905)  Free From Fall Injury:   Instruct family/caregiver on patient safety   Based on caregiver fall risk screen, instruct family/caregiver to ask for assistance with transferring infant if caregiver noted to have fall risk factors  10/14/2024 1928 by Froylan Thomas RN  Outcome: Progressing     Problem: ABCDS Injury Assessment  Goal: Absence of physical injury  10/15/2024 0906 by Henri David RN  Outcome: Progressing  Flowsheets (Taken 10/15/2024 0905)  Absence of Physical Injury: Implement safety measures based on patient assessment  10/14/2024 1928 by Froylan Thomas RN  Outcome: Progressing     Problem: Neurosensory - Adult  Goal: Achieves stable or improved neurological status  10/15/2024 0906 by Henri David RN  Outcome: Progressing  Flowsheets (Taken 10/15/2024 0715)  Achieves stable or improved neurological status:   Assess for and report changes in neurological status   Initiate measures to prevent increased intracranial pressure   Maintain blood pressure and fluid volume within ordered parameters to optimize cerebral perfusion and minimize risk of hemorrhage  10/14/2024 1928 by Froylan Thomas, RN  Outcome: Progressing  Goal: Absence of seizures  10/15/2024 0906 by Henri David, RN  Outcome: Progressing  Flowsheets (Taken

## 2024-10-15 NOTE — PROGRESS NOTES
OCCUPATIONAL THERAPY INITIAL EVALUATION    [facility] [address] [place], [place]       Date:10/15/2024                                                  Patient Name: Juan J Choudhury  MRN: [id number]  : 1940  Room: [address] [place] [address]    Evaluating OT: Castro Dillon OTR/L [id number]    Referring Provider: Margarito Ledesma MD      Specific Provider Orders/Date: OT evaluation and treatment 10/12/24 0947    Diagnosis:  Intracranial hemorrhage (HCC) [I62.9]  Injury of head, initial encounter [S09.90XA]  Fall, initial encounter [W19.XXXA]  Laceration of scalp, initial encounter [S01.01XA]      Pertinent Medical History:  has no past medical history on file.   No past surgical history on file.    Pt admitted to the hospital 10/10 with fall and AMS     Orders received, chart reviewed, eval complete.     Precautions:  Fall Risk, SPB<140, cognition, hx of dementia,     Assessment of current deficits   [x] Functional mobility   [x]ADLs  [x] Strength               [x]Cognition   [x] Functional transfers   [] IADLs         [x] Safety Awareness   [x]Endurance   [] Fine Motor Coordination [x] Balance [] Vision/perception   []Sensation    [] Gross Motor Coordination [] ROM  [] Delirium                  [] Motor Control     OT PLAN OF CARE   OT POC based on physician orders, patient diagnosis and results of clinical assessment    Frequency/Duration 1-3 days/wk for 2 weeks PRN   Specific OT Treatment to include:   * Instruction/training on adapted ADL techniques and AE recommendations to increase functional independence within precautions       * Training on energy conservation strategies, correct breathing pattern and techniques to improve independence/tolerance for self-care routine  * Functional transfer/mobility training/DME recommendations for increased independence, safety, and fall prevention  * Patient/Family education to increase follow through with safety  gathering information on past medical history/social history and prior level of function, completion of standardized testing/informal observation of tasks, assessment of data and education on plan of care and goals.          Castro Dillon OTR/L [id number]

## 2024-10-15 NOTE — CARE COORDINATION
Call from Yaritza @ [facility] asking about pt- return call to ext [phone] vm left. Also call from Grace @ [facility]- he is only 10 % service connected - so if going to snf will need to go under his anthem, however if going home she could set up services form home. Call placed to wife and daughter Kimmie- she  would like referral to [facility]. Vm left for Ruby for call back.Electronically signed by Melissa Farias RN on 10/15/2024 at 10:11 AM      Referral to Ruby  @ [facility] await determination.Ntfd Grace car @ [facility] of above x [phone].Electronically signed by Melissa Farias RN on 10/15/2024 at 11:15 AM      [facility] ambulance form and envelope in soft chart.Electronically signed by Melissa Farias RN on 10/15/2024 at 1:32 PM    [facility] is submitting for precert. Called and ntfd Dr Ledesma that daughter wanted an update and  gave him her cell #. Electronically signed by Melissa Farias RN on 10/15/2024 at 3:25 PM

## 2024-10-15 NOTE — PLAN OF CARE
Attempted to see however patient is off the unit for CT.      Small area of hemorrhage on the CT.  Spoke to Dr. Ramírez and had him review images, will hold all antiplatelets and plan to repeat CT on 10/25        Neurology will continue to follow.

## 2024-10-16 ENCOUNTER — APPOINTMENT (OUTPATIENT)
Dept: GENERAL RADIOLOGY | Age: 84
DRG: 040 | End: 2024-10-16
Payer: MEDICARE

## 2024-10-16 ENCOUNTER — APPOINTMENT (OUTPATIENT)
Dept: NEUROLOGY | Age: 84
DRG: 040 | End: 2024-10-16
Payer: MEDICARE

## 2024-10-16 PROBLEM — Z51.5 PALLIATIVE CARE ENCOUNTER: Status: ACTIVE | Noted: 2024-10-16

## 2024-10-16 PROBLEM — E43 SEVERE PROTEIN-CALORIE MALNUTRITION (HCC): Chronic | Status: ACTIVE | Noted: 2024-10-16

## 2024-10-16 LAB
ALBUMIN SERPL-MCNC: 4 G/DL (ref 3.5–5.2)
ALP SERPL-CCNC: 90 U/L (ref 40–129)
ALT SERPL-CCNC: 9 U/L (ref 0–40)
AMMONIA PLAS-SCNC: 23 UMOL/L (ref 16–60)
ANION GAP SERPL CALCULATED.3IONS-SCNC: 13 MMOL/L (ref 7–16)
AST SERPL-CCNC: 18 U/L (ref 0–39)
BASOPHILS # BLD: 0.1 K/UL (ref 0–0.2)
BASOPHILS NFR BLD: 1 % (ref 0–2)
BILIRUB SERPL-MCNC: 0.6 MG/DL (ref 0–1.2)
BUN SERPL-MCNC: 25 MG/DL (ref 6–23)
CALCIUM SERPL-MCNC: 9.4 MG/DL (ref 8.6–10.2)
CHLORIDE SERPL-SCNC: 99 MMOL/L (ref 98–107)
CO2 SERPL-SCNC: 26 MMOL/L (ref 22–29)
CREAT SERPL-MCNC: 1.4 MG/DL (ref 0.7–1.2)
EOSINOPHIL # BLD: 0 K/UL (ref 0.05–0.5)
EOSINOPHILS RELATIVE PERCENT: 0 % (ref 0–6)
ERYTHROCYTE [DISTWIDTH] IN BLOOD BY AUTOMATED COUNT: 12.5 % (ref 11.5–15)
GFR, ESTIMATED: 51 ML/MIN/1.73M2
GLUCOSE SERPL-MCNC: 121 MG/DL (ref 74–99)
HCT VFR BLD AUTO: 47.5 % (ref 37–54)
HGB BLD-MCNC: 16 G/DL (ref 12.5–16.5)
LYMPHOCYTES NFR BLD: 0.61 K/UL (ref 1.5–4)
LYMPHOCYTES RELATIVE PERCENT: 5 % (ref 20–42)
MCH RBC QN AUTO: 29.4 PG (ref 26–35)
MCHC RBC AUTO-ENTMCNC: 33.7 G/DL (ref 32–34.5)
MCV RBC AUTO: 87.3 FL (ref 80–99.9)
MONOCYTES NFR BLD: 1.31 K/UL (ref 0.1–0.95)
MONOCYTES NFR BLD: 11 % (ref 2–12)
NEUTROPHILS NFR BLD: 83 % (ref 43–80)
NEUTS SEG NFR BLD: 9.58 K/UL (ref 1.8–7.3)
PLATELET # BLD AUTO: 248 K/UL (ref 130–450)
PMV BLD AUTO: 8.8 FL (ref 7–12)
POTASSIUM SERPL-SCNC: 3.9 MMOL/L (ref 3.5–5)
PROT SERPL-MCNC: 6.8 G/DL (ref 6.4–8.3)
RBC # BLD AUTO: 5.44 M/UL (ref 3.8–5.8)
RBC # BLD: ABNORMAL 10*6/UL
SODIUM SERPL-SCNC: 138 MMOL/L (ref 132–146)
TSH SERPL DL<=0.05 MIU/L-ACNC: 7.01 UIU/ML (ref 0.27–4.2)
WBC OTHER # BLD: 11.6 K/UL (ref 4.5–11.5)

## 2024-10-16 PROCEDURE — 80053 COMPREHEN METABOLIC PANEL: CPT

## 2024-10-16 PROCEDURE — 6370000000 HC RX 637 (ALT 250 FOR IP): Performed by: INTERNAL MEDICINE

## 2024-10-16 PROCEDURE — 84443 ASSAY THYROID STIM HORMONE: CPT

## 2024-10-16 PROCEDURE — 92611 MOTION FLUOROSCOPY/SWALLOW: CPT

## 2024-10-16 PROCEDURE — 95816 EEG AWAKE AND DROWSY: CPT | Performed by: PSYCHIATRY & NEUROLOGY

## 2024-10-16 PROCEDURE — 99232 SBSQ HOSP IP/OBS MODERATE 35: CPT | Performed by: NURSE PRACTITIONER

## 2024-10-16 PROCEDURE — 99222 1ST HOSP IP/OBS MODERATE 55: CPT | Performed by: NURSE PRACTITIONER

## 2024-10-16 PROCEDURE — 92526 ORAL FUNCTION THERAPY: CPT

## 2024-10-16 PROCEDURE — 85025 COMPLETE CBC W/AUTO DIFF WBC: CPT

## 2024-10-16 PROCEDURE — 6360000002 HC RX W HCPCS: Performed by: NURSE PRACTITIONER

## 2024-10-16 PROCEDURE — 36415 COLL VENOUS BLD VENIPUNCTURE: CPT

## 2024-10-16 PROCEDURE — 95819 EEG AWAKE AND ASLEEP: CPT

## 2024-10-16 PROCEDURE — 71045 X-RAY EXAM CHEST 1 VIEW: CPT

## 2024-10-16 PROCEDURE — 74230 X-RAY XM SWLNG FUNCJ C+: CPT

## 2024-10-16 PROCEDURE — 82140 ASSAY OF AMMONIA: CPT

## 2024-10-16 PROCEDURE — 2580000003 HC RX 258: Performed by: INTERNAL MEDICINE

## 2024-10-16 PROCEDURE — 2060000000 HC ICU INTERMEDIATE R&B

## 2024-10-16 RX ORDER — ACETAMINOPHEN 650 MG/1
650 SUPPOSITORY RECTAL EVERY 4 HOURS PRN
Status: DISCONTINUED | OUTPATIENT
Start: 2024-10-16 | End: 2024-10-24 | Stop reason: HOSPADM

## 2024-10-16 RX ORDER — DEXTROSE MONOHYDRATE AND SODIUM CHLORIDE 5; .9 G/100ML; G/100ML
INJECTION, SOLUTION INTRAVENOUS CONTINUOUS
Status: DISCONTINUED | OUTPATIENT
Start: 2024-10-16 | End: 2024-10-21

## 2024-10-16 RX ADMIN — LEVETIRACETAM 250 MG: 100 INJECTION INTRAVENOUS at 20:50

## 2024-10-16 RX ADMIN — LEVETIRACETAM 250 MG: 100 INJECTION INTRAVENOUS at 08:58

## 2024-10-16 RX ADMIN — DEXTROSE AND SODIUM CHLORIDE: 5; 900 INJECTION, SOLUTION INTRAVENOUS at 16:33

## 2024-10-16 RX ADMIN — ACETAMINOPHEN 650 MG: 650 SUPPOSITORY RECTAL at 16:33

## 2024-10-16 ASSESSMENT — PAIN SCALES - GENERAL
PAINLEVEL_OUTOF10: 0

## 2024-10-16 NOTE — PROGRESS NOTES
Neuro Inpatient Follow Up Note       Juan J Choudhury is a 83 y.o.  male     Neuro is following for AMS and fall    PMH: Prior stroke, vascular dementia, PVD, COPD, CKD, history of prostatic carcinoma, hypothyroidism, active smoker    Patient presented to ED on 10/10 after having a fall about 30 minutes prior to arrival.  After the fall patient was confused.  Patient was unable to state his name or answer any questions appropriately.  He will follow some commands and appeared to have equal strength in all 4 extremities.  Per EMS he was oriented x 3 with them.  Patient goes out to smoke on his porch every night and on the day of arrival patient was doing so when he had a fall.  Per ED documentation, patient's daughter reported being off balance and seeming to decline over the past few days including staring off into space.    CT head shows small left basal ganglia hemorrhage.  CTA head and neck showed moderate stenosis in the right M1, left A2 and 60 to 70% stenosis in the proximal right ICA.  Patient also had high-grade stenosis in the left subclavian.  MRI brain showed an acute left subcortical stroke involving the left corona radiata down to the basal ganglia with small region of hemorrhage involving the left external capsule    Vital signs are stable    There is no family present at bedside current    Objective:     /81   Pulse 100   Temp 98.7 °F (37.1 °C) (Oral)   Resp 26   Ht 1.702 m (5' 7\")   Wt 63.5 kg (140 lb)   SpO2 93%   BMI 21.93 kg/m²     General appearance: Lethargic, appears stated age, cooperative and no distress  Head: normocephalic, without obvious abnormality, atraumatic  Eyes: conjunctivae/corneas clear. .  Neck:  supple, symmetrical, trachea midline   Lungs: Unlabored breaths on room air  Heart: regular rate and rhythm  Extremities: normal, atraumatic, no cyanosis or edema  Pulses: 2+ and symmetric  Skin: color, texture, turgor normal---no rashes or lesions      Mental Status: Lethargic,

## 2024-10-16 NOTE — PROGRESS NOTES
Comprehensive Nutrition Assessment    Type and Reason for Visit:  Reassess    Nutrition Recommendations/Plan:   Continue NPO ; await MBSS/SLP eval.  Will provide nutrition recs as appropriate w/ nutrition progression/SLP recs ; if pt fails swallow eval and POC is EN support, please consult for TF recs.   Will continue to monitor.      Malnutrition Assessment:  Malnutrition Status:  Severe malnutrition (10/16/24 1354)    Context:  Chronic Illness     Findings of the 6 clinical characteristics of malnutrition:  Energy Intake:  75% or less estimated energy requirements for 1 month or longer (2/2 advanced age; family at bedside report pt's appetite/oral intake PTA were \"okay\")  Weight Loss:  Unable to assess (d/t lack of wt hx per EMR)     Body Fat Loss:  Severe body fat loss Orbital, Triceps, Buccal region   Muscle Mass Loss:  Severe muscle mass loss Temples (temporalis), Clavicles (pectoralis & deltoids), Hand (interosseous)  Fluid Accumulation:  No significant fluid accumulation     Strength:  Not Performed    Nutrition Assessment:    pt adm d/t AMS/fall w/ scalp laceration and intracranial hemorrhage; no PMhx on file; SLP recommend soft/bite size solids and thin liquids on 10/12; pt s/p CT showing hemorrhage slightly enlarged; lethargy this adm noted; pt meets criteria for severe malnutrition; pt NPO awaiting MBSS/SLP eval; pt meets criteria for severe malnutrition; spoke w/ family at bedside about NPO status ; will monitor and provide nutrition recs as appropriate w/ diet advancement.    Nutrition Related Findings:    alert; oriented to person (oriented/disoriented at times); abd WNL; no edema; I/O WNL Wound Type: Multiple (traumatic wounds x 2 noted per doc flow)       Current Nutrition Intake & Therapies:    Average Meal Intake: NPO  Average Supplements Intake: NPO  Diet NPO    Anthropometric Measures:  Height: 170.2 cm (5' 7.01\")  Ideal Body Weight (IBW): 148 lbs (67 kg)    Admission Body Weight: 61 kg (134  lb 7.7 oz) (10/16-estimated; pt w/ broken bedscale this adm)  Current Body Weight: 61 kg (134 lb 7.7 oz) (10/16-estimated; UTO measured CBW as pt w/ broken bedscale (bedscale kept reading 21.3 lbs)), 90.9 % IBW.    Current BMI (kg/m2): 21.1  Usual Body Weight:  (NATE d/t lack of wt hx per EMR)     Weight Adjustment For: No Adjustment                 BMI Categories: Underweight (BMI less than 22) age over 65    Estimated Daily Nutrient Needs:  Energy Requirements Based On: Formula  Weight Used for Energy Requirements: Current  Energy (kcal/day): 8695-8444  Weight Used for Protein Requirements: Current  Protein (g/day): 1.3-1.5g/kgxIBW=80-90g  Method Used for Fluid Requirements: 1 ml/kcal  Fluid (ml/day): 0510-3448    Nutrition Diagnosis:   Severe malnutrition, In context of chronic illness related to inadequate protein-energy intake (2/2 advanced age) as evidenced by Criteria as identified in malnutrition assessment    Nutrition Interventions:   Food and/or Nutrient Delivery: Continue NPO (will provide nutrition recs as appropriate w/ nutrition progression.)  Nutrition Education/Counseling: Education not appropriate  Coordination of Nutrition Care: Continue to monitor while inpatient  Plan of Care discussed with: family at bedside.    Goals:     Goals: other (specify)  Specify Other Goals: Nutrition Progression.    Nutrition Monitoring and Evaluation:   Behavioral-Environmental Outcomes: None Identified  Food/Nutrient Intake Outcomes: Diet Advancement/Tolerance  Physical Signs/Symptoms Outcomes: Biochemical Data, Nutrition Focused Physical Findings, Skin, Chewing or Swallowing, Weight, GI Status, Fluid Status or Edema    Discharge Planning:    Too soon to determine     Kiki Lentz RD, LD  Contact: [phone]

## 2024-10-16 NOTE — PROGRESS NOTES
Orders given to consult general surgery for PEG tube insertion per Dr. Ledesma. General surgery consult sen to Dr. Rubio and resident.

## 2024-10-16 NOTE — PLAN OF CARE
Problem: Discharge Planning  Goal: Discharge to home or other facility with appropriate resources  Outcome: Progressing     Problem: Safety - Adult  Goal: Free from fall injury  Outcome: Progressing     Problem: ABCDS Injury Assessment  Goal: Absence of physical injury  Outcome: Progressing     Problem: Skin/Tissue Integrity  Goal: Absence of new skin breakdown  Description: 1.  Monitor for areas of redness and/or skin breakdown  2.  Assess vascular access sites hourly  3.  Every 4-6 hours minimum:  Change oxygen saturation probe site  4.  Every 4-6 hours:  If on nasal continuous positive airway pressure, respiratory therapy assess nares and determine need for appliance change or resting period.  Outcome: Progressing     Problem: Nutrition Deficit:  Goal: Optimize nutritional status  Outcome: Progressing     Problem: Neurosensory - Adult  Goal: Achieves stable or improved neurological status  Outcome: Progressing  Goal: Absence of seizures  Outcome: Progressing  Goal: Achieves maximal functionality and self care  Outcome: Progressing      I reviewed the H&P, I examined the patient, and there are no changes in the patient's condition.  Migue Lloyd MD

## 2024-10-16 NOTE — PROCEDURES
[facility] Neurodiagnostic Report    MRN: [id number]  PATIENT NAME: Juan J Choudhury  DATE OF REPORT: 10/16/2024    DATE OF SERVICE: 10/16/2024  PHYSICIAN NAME: Jamal Ramírez DO  STUDY ORDERED BY: Melina Bean      Patient's : 1940  Patient's Age: 83 y.o.  Gender: male    PROCEDURE: Routine EEG with video      Clinical Interpretation:   This abnormal study showed evidence of:    A moderate nonspecific encephalopathy    No seizures or epileptiform discharges were noted during this study.     __________________________  Electronically signed by: Jamal Ramírez DO, 10/16/2024 7:58 AM      Patient Clinical Information   Reason for Study: The patient is undergoing evaluation for altered mental status  Patient State: Somnolent  Primary neurological diagnosis: Altered mental status  Primary indication for monitoring: Diagnosis of nonconvulsive seizures    Pertinent Medications and Treatments    levetiracetam     Sedatives administered: No  Intubated: No  Pharmacological paralytic: No    Reporting Period  Start of Study: 736, 10/16/2024   End of Study:  0801, 10/16/2024       EEG Description  Digital video and scalp EEG monitoring was performed using the standard protocol for this laboratory. Scalp electrodes were applied in the international 10/20 system. Multiple digital montage arrangements were utilized for evaluation. EKG and video were recorded.     Background:      Occipital rhythm (posterior dominant rhythm or PDR): Absent   Voltage: Medium  Organization: poor to fair  Reactivity to eye opening/closure: Not tested    Drowsiness: Present - abnormal, mildly increased generalized irregular delta activity noted  Sleep: Absent    Comments: In the waking state the background is composed primarily of generalized irregular theta activity.    Technical and Activation Procedures:  Hyperventilation: Not done  Photic stimulation: Done - physiologic driving noted  Reactivity to

## 2024-10-16 NOTE — CARE COORDINATION
Here for Intracranial hemorrhage. Keppra IV decreased to 250 mg bid.Per neurosurgery- Patient underwent head CT for lethargy yesterday the left insular hemorrhage was slightly enlarged certainly does not explain his lethargy and EEG has been ordered pupils remain equal round reactive Had EEG this am.Awaiting speech as pt has been lethargic.Per Ruby with [facility] She has insurance auth for him good thru 10/22. [facility] ambulance form and envelope in soft chart Electronically signed by Melissa Farias RN on 10/16/2024 at 9:46 AM    Per notes-leukocytosis 11.6  Portable CXR Palliative care have been consulted  Video swallow ordered Electronically signed by Melissa Farias RN on 10/16/2024 at 12:56 PM    Hospice consult placed in [facility] and called to [facility].Electronically signed by Melissa Farias RN on 10/16/2024 at 3:42 PM

## 2024-10-16 NOTE — PROGRESS NOTES
SPEECH/LANGUAGE PATHOLOGY  VIDEOFLUOROSCOPIC STUDY OF SWALLOWING (MBS)   and PLAN OF CARE    PATIENT NAME:  Juan J Choudhury  (male)     MRN:  [id number]    :  1940  (83 y.o.)  STATUS:  Inpatient: Room 8506/8506-B    TODAY'S DATE:  10/16/2024  ORDER DATE, DESCRIPTION AND REFERRING PROVIDER: 10-16-24 [place] MODIFIED BARIUM SWALLOW W VIDEO [[id number]] Dr. Ledesma   REASON FOR REFERRAL: dysphagia   EVALUATING THERAPIST: ZACH Gutierrez      RESULTS:      DYSPHAGIA DIAGNOSIS:  Clinical indicators of moderate-severe oropharyngeal phase dysphagia     DIET RECOMMENDATIONS:  NPO -- including medications to be given via alternate method      .      FEEDING RECOMMENDATIONS:    Assistance level:  Not applicable     Compensatory strategies recommended: Thorough oral care to prevent colonization of oral bacteria      Discussed recommendations with:  charge nurse via phone       SPEECH THERAPY  PLAN OF CARE   The dysphagia POC is established based on physician order and dysphagia diagnosis    Skilled SLP intervention for dysphagia management on acute care up to 5 x per week until goals met, pt plateaus in function and/or discharged from hospital      Conditions Requiring Skilled Therapeutic Intervention for dysphagia:    Patient is performing below functional baseline d/t  current acute condition, Multiple diagnoses, multiple medications, and increased dependency upon caregivers.    SPECIFIC DYSPHAGIA INTERVENTIONS TO INCLUDE:     ongoing evaluation of swallow function to determine when PO diet can be safely initiated    Specific instructions for next treatment:  ongoing skilled PO analysis to determine if PO diet can be initiated   Treatment Goals:    Short Term Goals:  Pt will participate in ongoing evaluation of swallow function to determine when PO diet can be safely initiated    Long Term Goals:   Pt will improve oropharyngeal swallow function to ensure airway protection during PO intake to maintain adequate  evaluation and that intervention is warranted at this time.  Learner: Patient  Education: Reviewed results and recommendations of this evaluation  Evaluation of Education:  Needs further instruction    This plan may be re-evaluated and revised as warranted.        Evaluation Time includes thorough review of current medical information, gathering information on past medical history/social history and prior level of function, completion of standardized testing/informal observation of tasks, assessment of data and education on plan of care and goals.    [x]The admitting diagnosis and active problem list, have been reviewed prior to initiation of this evaluation.    CPT Code: 59456  dysphagia study    ACTIVE PROBLEM LIST:   Patient Active Problem List   Diagnosis    Intracranial hemorrhage (HCC)    Head injury    Acute stroke due to ischemia (HCC)    Severe protein-calorie malnutrition (HCC)    Palliative care encounter       INTERVENTION    Speech Pathologist (SLP) completed education with the patient/family regarding procedure of Modified Barium Swallow Study prior to exam and then type of swallowing impairment following completion of MBSS.    Reviewed  diet recommendations --  NPO    Images from MBSS reviewed with patient/ family and education provided. Reviewed aspiration precautions. Encouraged patient and/or family to engage SLP in unstructured Q&A session relative to identified deficit areas; indicated understanding of all information provided via satisfactory verbal response.

## 2024-10-16 NOTE — SIGNIFICANT EVENT
General surgery reconsulted today for evaluation for PEG tube placement.  Reviewed palliative care note, per palliative care note patient's wife needed some time to think about PEG tube placement and also is wanting to talk with hospice.      Called patient's wife, Claudia, to discuss with her the PEG tube consult.  Patient's wife states that she has not yet made a decision.  Discussed risk and benefits of PEG tube placement.  Patient states that she understood both risks and benefits and would like to discuss further with her children.  Wife reports that daughter will be in sometime tomorrow and would likely want to discuss with the physician then.  Will hold on proceeding with PEG tube placement.      SLP has evaluated patient, recommend n.p.o. status.  Patient now on 4 L nasal cannula from room air and tachycardic.  TSH 7.      Will await family decision on PEG tube placement.      Electronically signed by Justyna Valdez DO on 10/16/2024 at 7:44 PM

## 2024-10-16 NOTE — CONSULTS
Palliative Care Department  [phone]  Palliative Care Initial Consult  Provider Virgen Lynn, APRN - CNP     Juan J Choudhury  [id number]  Hospital Day: 7  Date of Initial Consult: 10/16/2024  Referring Provider: Margarito Ledesma MD  Palliative Medicine was consulted for assistance with: Goals of care    HPI:   Juan J Choudhury is a 83 y.o. with a medical history of CVA who was admitted on 10/10/2024 from home with a CHIEF COMPLAINT of altered mental status.  CT of the head showed a small left basal ganglia hemorrhage.  CTA head and neck showed moderate stenosis of the right M1, left A2 and 60 to 70% stenosis of the proximal right ICA.  There was high-grade stenosis of the left subclavian.  MRI of the brain showed an acute left subcortical stroke involving the left corona radiata down to the basal ganglia with a small region of hemorrhage involving the left external capsule.  Neurosurgery, neurology consulted.  Palliative medicine consulted for further assistance.    ASSESSMENT/PLAN:     Pertinent Hospital Diagnoses     CVA  Acute encephalopathy  [facility]      Palliative Care Encounter / Counseling Regarding Goals of Care  Please see detailed goals of care discussion as below  At this time, Juan J Choudhury, Does Not have capacity for medical decision-making.  Capacity is time limited and situation/question specific  During encounter Claudia was surrogate medical decision-maker  Outcome of goals of care meeting:   Continue current medical management  Reviewed CODE STATUS  Discussed hospice philosophy  Code status Limited no to all options  Advanced Directives: no POA or living will in [facility]  Surrogate/Legal NOK:  Claudia Choudhury (spouse) [phone]  Kimmie Grewal (child) [phone]      Spiritual assessment: no spiritual distress identified  Bereavement and grief: to be determined  Referrals to: none today  SUBJECTIVE:     Current medical issues leading to Palliative Medicine involvement include   Active Hospital Problems

## 2024-10-16 NOTE — PROGRESS NOTES
the proximal right internal carotid artery results in approximately 60-70% stenosis.  No significant stenosis in the left carotid arteries. VERTEBRAL ARTERIES: Moderate to severe stenosis of the origin of the right vertebral artery.  The remainder of the right vertebral artery is widely patent.  Diminished flow related enhancement in the left vertebral artery likely due to the high-grade stenosis in the left subclavian artery.  The origin of the right vertebral artery is obscured by beam hardening artifact. SOFT TISSUES: The lung apices are clear.  1.8 x 1.2 cm hypodense lesion in the right parotid gland.  No cervical or superior mediastinal lymphadenopathy.  The larynx and pharynx are unremarkable.  No acute abnormality of the salivary and thyroid glands. BONES: No acute osseous abnormality. CTA HEAD: ANTERIOR CIRCULATION: Moderate stenosis grade stenosis in the distal M1 segment of the right middle cerebral artery.  No significant stenosis in the left middle cerebral arteries.  Moderate stenoses are noted in the A2 segment of the left anterior cerebral artery.  No significant stenosis noted in the right anterior cerebral artery.  No significant stenosis noted in the internal carotid arteries. POSTERIOR CIRCULATION: No significant stenosis of the vertebral, basilar, or posterior cerebral arteries. No aneurysm. OTHER: No dural venous sinus thrombosis on this non-dedicated study. BRAIN: No mass effect or midline shift. No extra-axial fluid collection. The gray-white differentiation is maintained.     CTA NECK: 1. 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 2. Diminished flow related enhancement in the left vertebral artery and the distal left subclavian artery.  The site of the severe stenosis in the left subclavian artery is obscured by beam hardening artifact. 3. Approximately 60-70% stenosis of the proximal right internal carotid artery. 4. Moderate to severe stenosis of the origin of the  severe stenosis of the origin of the right vertebral artery. 5. 1.8 x 1.2 cm hypodense lesion in the right parotid gland. Further evaluation with pre and post-contrast enhanced MRI of the neck recommended. CTA HEAD: 1. Moderate stenosis in the distal M1 segment of the right middle cerebral artery. 2. Moderate stenoses in the A2 segment of the left anterior cerebral artery. 3. No significant stenosis in the posterior circulation.     CT HEAD WO CONTRAST    Result Date: 10/10/2024  EXAMINATION: CT OF THE HEAD WITHOUT CONTRAST  10/10/2024 9:01 pm TECHNIQUE: CT of the head was performed without the administration of intravenous contrast. Automated exposure control, iterative reconstruction, and/or weight based adjustment of the mA/kV was utilized to reduce the radiation dose to as low as reasonably achievable. COMPARISON: None. HISTORY: ORDERING SYSTEM PROVIDED HISTORY: AMS + Fall; concern for CVA vs ICH TECHNOLOGIST PROVIDED HISTORY: Reason for exam:->AMS + Fall; concern for CVA vs ICH Has a \"code stroke\" or \"stroke alert\" been called?->Yes Decision Support Exception - unselect if not a suspected or confirmed emergency medical condition->Emergency Medical Condition (MA) What reading provider will be dictating this exam?->CRC FINDINGS: BRAIN/VENTRICLES: Tiny hyperdense area in the left basal ganglia measuring 0.5 cm.  Image 32 series 307.  In the setting of trauma, could be a small contusion.  No significant mass effect or midline shift.  Age related changes of brain volume loss and nonspecific white matter hypodensity most often ascribed to chronic small vessel ischemia.  Atherosclerotic vascular calcifications. ORBITS: The visualized portion of the orbits demonstrate no acute abnormality. SINUSES: The visualized paranasal sinuses and mastoid air cells demonstrate no acute abnormality. SOFT TISSUES/SKULL:  Right parietal scalp soft tissue injury.     1. Tiny hyperdense area in the left basal ganglia measuring 0.5 cm.

## 2024-10-16 NOTE — PROGRESS NOTES
SPEECH LANGUAGE PATHOLOGY  DAILY PROGRESS NOTE        PATIENT NAME:  Juan J Choudhury      ROOM:  8506/8506-B :  1940         TODAY'S DATE:  10/16/2024       Bedside swallow evaluation initiated.  Pt appeared to swallow juice without difficulty however would not stay alert to participate fully.    Recommend MBS for full evaluation.  Nursing notified.

## 2024-10-16 NOTE — PROGRESS NOTES
Notified Dr. Ledesma pt temperature is 101. Orders given to order tylenol 650 mg suppository Q 4 prn per Dr. Ledesma.

## 2024-10-16 NOTE — PROGRESS NOTES
Notified Dr. Ledesma pt daughter would like him to call and also wants pt on continues fluids. Orders given to order D5 and 0.9 sodium chloride at 100 ml/hr per Dr. Ledesma.

## 2024-10-16 NOTE — PROGRESS NOTES
Behavioral Health Psychotherapy Progress Note    Psychotherapy Provided: Individual Psychotherapy     1. Attention deficit hyperactivity disorder (ADHD), predominantly hyperactive type            Goals addressed in session: Goal 1     DATA: Jenniffer said that her week has been good week.  Her only problem is that she refused to take medicine at home so she is taking it at the school.  She was asked why the difference and she said she just does not want to be late to school. She started playing with legos as she talked.  She told the therapist that NIKOLAS orozco is coming and that he was shot because he stood up for black people.  There was as conversation about this and she asked why someone would shoot him and said, he was a person that fought with words.  Jenniffer said she likes when she has half days and can drink hot chocolate with friends.  She did not like staying at home and does not like when she has to leave school for summer break.  Jenniffer liked playing SUNG with the therapist and waving her hands and yelling and fake laughing as she played and got cards she liked or had a good move.  Jenniffer and the therapist talked on the way back to class about listening to the teacher and listening to her mom and what is hard and easy about the two.  The is especially apparent when taking medicine.  She is beginning to take it all at school because she will not take it at home and Jenniffer was asked what may happen in the summer.  She was not sure.      During this session, this clinician used the following therapeutic modalities: Cognitive Behavioral Therapy    Substance Abuse was not addressed during this session. If the client is diagnosed with a co-occurring substance use disorder, please indicate any changes in the frequency or amount of use: NA. Stage of change for addressing substance use diagnoses: No substance use/Not applicable    ASSESSMENT:  Jenniffer Arteaga presents with a Euthymic/ normal mood.     her affect is  New hypoxia with sinus tachycardia  Modest leukocytosis   Lungs mostly unchanged  Portable CXR  Palliative care have been consulted  Video swallow ordered    "Normal range and intensity, which is congruent, with her mood and the content of the session. The client has made progress on their goals.     Jenniffer Arteaga presents with a none risk of suicide, none risk of self-harm, and none risk of harm to others.    For any risk assessment that surpasses a \"low\" rating, a safety plan must be developed.    A safety plan was indicated: no  If yes, describe in detail NA    PLAN: Between sessions, Jenniffer Arteaga will express her feelings. At the next session, the therapist will use Cognitive Behavioral Therapy to address emotional understanding.    Behavioral Health Treatment Plan and Discharge Planning: Jenniffer Arteaga is aware of and agrees to continue to work on their treatment plan. They have identified and are working toward their discharge goals. yes    Visit start and stop times:    01/11/24  Start Time: 1120  Stop Time: 1158  Total Visit Time: 38 minutes  "

## 2024-10-17 ENCOUNTER — APPOINTMENT (OUTPATIENT)
Dept: GENERAL RADIOLOGY | Age: 84
DRG: 040 | End: 2024-10-17
Payer: MEDICARE

## 2024-10-17 LAB
ALBUMIN SERPL-MCNC: 3.3 G/DL (ref 3.5–5.2)
ALP SERPL-CCNC: 71 U/L (ref 40–129)
ALT SERPL-CCNC: 7 U/L (ref 0–40)
ANION GAP SERPL CALCULATED.3IONS-SCNC: 10 MMOL/L (ref 7–16)
AST SERPL-CCNC: 12 U/L (ref 0–39)
BASOPHILS # BLD: 0 K/UL (ref 0–0.2)
BASOPHILS NFR BLD: 0 % (ref 0–2)
BILIRUB SERPL-MCNC: 0.6 MG/DL (ref 0–1.2)
BUN SERPL-MCNC: 27 MG/DL (ref 6–23)
CALCIUM SERPL-MCNC: 8.9 MG/DL (ref 8.6–10.2)
CHLORIDE SERPL-SCNC: 107 MMOL/L (ref 98–107)
CO2 SERPL-SCNC: 25 MMOL/L (ref 22–29)
CREAT SERPL-MCNC: 1.2 MG/DL (ref 0.7–1.2)
EOSINOPHIL # BLD: 0 K/UL (ref 0.05–0.5)
EOSINOPHILS RELATIVE PERCENT: 0 % (ref 0–6)
ERYTHROCYTE [DISTWIDTH] IN BLOOD BY AUTOMATED COUNT: 12.7 % (ref 11.5–15)
GFR, ESTIMATED: 63 ML/MIN/1.73M2
GLUCOSE SERPL-MCNC: 170 MG/DL (ref 74–99)
HCT VFR BLD AUTO: 42.7 % (ref 37–54)
HGB BLD-MCNC: 14.5 G/DL (ref 12.5–16.5)
LYMPHOCYTES NFR BLD: 0.4 K/UL (ref 1.5–4)
LYMPHOCYTES RELATIVE PERCENT: 5 % (ref 20–42)
MCH RBC QN AUTO: 29.4 PG (ref 26–35)
MCHC RBC AUTO-ENTMCNC: 34 G/DL (ref 32–34.5)
MCV RBC AUTO: 86.4 FL (ref 80–99.9)
MONOCYTES NFR BLD: 0.4 K/UL (ref 0.1–0.95)
MONOCYTES NFR BLD: 5 % (ref 2–12)
NEUTROPHILS NFR BLD: 90 % (ref 43–80)
NEUTS SEG NFR BLD: 6.81 K/UL (ref 1.8–7.3)
PLATELET # BLD AUTO: 243 K/UL (ref 130–450)
PMV BLD AUTO: 9.3 FL (ref 7–12)
POTASSIUM SERPL-SCNC: 3.7 MMOL/L (ref 3.5–5)
PROT SERPL-MCNC: 5.9 G/DL (ref 6.4–8.3)
RBC # BLD AUTO: 4.94 M/UL (ref 3.8–5.8)
RBC # BLD: ABNORMAL 10*6/UL
RBC # BLD: ABNORMAL 10*6/UL
SODIUM SERPL-SCNC: 142 MMOL/L (ref 132–146)
WBC OTHER # BLD: 7.6 K/UL (ref 4.5–11.5)

## 2024-10-17 PROCEDURE — 36415 COLL VENOUS BLD VENIPUNCTURE: CPT

## 2024-10-17 PROCEDURE — 97530 THERAPEUTIC ACTIVITIES: CPT

## 2024-10-17 PROCEDURE — 2580000003 HC RX 258: Performed by: INTERNAL MEDICINE

## 2024-10-17 PROCEDURE — 71045 X-RAY EXAM CHEST 1 VIEW: CPT

## 2024-10-17 PROCEDURE — 6370000000 HC RX 637 (ALT 250 FOR IP): Performed by: INTERNAL MEDICINE

## 2024-10-17 PROCEDURE — 97535 SELF CARE MNGMENT TRAINING: CPT

## 2024-10-17 PROCEDURE — 2060000000 HC ICU INTERMEDIATE R&B

## 2024-10-17 PROCEDURE — 6360000002 HC RX W HCPCS: Performed by: NURSE PRACTITIONER

## 2024-10-17 PROCEDURE — 99232 SBSQ HOSP IP/OBS MODERATE 35: CPT | Performed by: NURSE PRACTITIONER

## 2024-10-17 PROCEDURE — 94640 AIRWAY INHALATION TREATMENT: CPT

## 2024-10-17 PROCEDURE — 80053 COMPREHEN METABOLIC PANEL: CPT

## 2024-10-17 PROCEDURE — 85025 COMPLETE CBC W/AUTO DIFF WBC: CPT

## 2024-10-17 PROCEDURE — 2700000000 HC OXYGEN THERAPY PER DAY

## 2024-10-17 RX ORDER — IPRATROPIUM BROMIDE AND ALBUTEROL SULFATE 2.5; .5 MG/3ML; MG/3ML
1 SOLUTION RESPIRATORY (INHALATION)
Status: DISCONTINUED | OUTPATIENT
Start: 2024-10-17 | End: 2024-10-24 | Stop reason: HOSPADM

## 2024-10-17 RX ADMIN — DEXTROSE AND SODIUM CHLORIDE: 5; 900 INJECTION, SOLUTION INTRAVENOUS at 02:05

## 2024-10-17 RX ADMIN — LEVETIRACETAM 250 MG: 100 INJECTION INTRAVENOUS at 08:23

## 2024-10-17 RX ADMIN — ACETAMINOPHEN 650 MG: 650 SUPPOSITORY RECTAL at 09:07

## 2024-10-17 RX ADMIN — IPRATROPIUM BROMIDE AND ALBUTEROL SULFATE 1 DOSE: 2.5; .5 SOLUTION RESPIRATORY (INHALATION) at 19:41

## 2024-10-17 RX ADMIN — LEVETIRACETAM 250 MG: 100 INJECTION INTRAVENOUS at 21:54

## 2024-10-17 RX ADMIN — DEXTROSE AND SODIUM CHLORIDE: 5; 900 INJECTION, SOLUTION INTRAVENOUS at 21:57

## 2024-10-17 ASSESSMENT — PAIN SCALES - GENERAL
PAINLEVEL_OUTOF10: 0
PAINLEVEL_OUTOF10: 0

## 2024-10-17 NOTE — PROGRESS NOTES
Still lethargic but does respond minimally  Febrile last 2 days   Leukocytosis better today   RRR  Lungs mostly clear but diminished   CXR demonstrated no new infiltrates   Long discussion with multiple family members  Plan is to proceed with PEG

## 2024-10-17 NOTE — PROGRESS NOTES
and fall prevention  * Patient/Family education to increase follow through with safety techniques and functional independence  * Recommendation of environmental modifications for increased safety with functional transfers/mobility and ADLs  * Cognitive retraining/development of therapeutic activities to improve problem solving, judgement, memory, and attention for increased safety/participation in ADL/IADL tasks  * Therapeutic exercise to improve motor endurance, ROM, and functional strength for ADLs/functional transfers  * Therapeutic activities to facilitate/challenge dynamic balance, stand tolerance for increased safety and independence with ADLs  * Positioning to improve skin integrity, interaction with environment and functional independence     Recommended Adaptive Equipment: TBD       Pt is a poor historian, unable to answer any PLOF questions, spouse present and assisted PRN   Home Living:  Pt lives with spouse   in a 1 story house with 2 steps to enter  and no hand rails    Bedroom setup: main level    Bathroom setup: main level  tub/shower combination   Equipment owned: front wheeled walker  and cane  shower chair       Prior Level of Function:  Pt I with ADLs , A with IADLs, and completed functional mobility with no AD vs ww vs cane   Per spouse \"when I get on him about it\"  Falls: yes   Driving: no   Occupation/leisure: did not state      Pain Level: denies pain   Location: n/a  OT provided: n/a     Cognition: A&O: 1/4; limited verbal responses   Lethargic limited verbal responses throughout session (family reports pt is Circle)  Max cues for maintaining open eyes/alertness, eyes only open 30% of session when seated at EOB              Follows single step directions: Poor 5-10% of time               Memory: Poor              Sequencing: Poor              Problem solving: Poor              Judgement/safety: Poor              Attention:  Poor      Functional Assessment: AM-PAC Inpatient Daily Activity Raw  Score: 6/24       Initial Eval Status  Date: 10/15/2024   Treatment Status  Date:  10/17/24 STG=LTG  Time frame: 10-14 days   Feeding Total/Dependent  Simulated   NPO Min A    Grooming Total/Dependent  To wash face - no initiation despite max cues and [employer] [place]  [employer] [place] A   Dep Min A    UB Dressing Total/Dependent  Gown   Dep Min A    LB Dressing Total/Dependent  Socks   Dep Mod A    Bathing Total/Dependent  Simulated   Dep Mod A    Toileting Total/Dependent     Dep  Incontinent of urine, dep for hygiene and change of pads  Mod A    Bed Mobility  Rolling L/R: Total/Dependent to the R   Max assist to the L   Supine to sit: Max x2   Sit to supine: Max x2   Dep  Rolling  Supine < > Sit Supine to sit: Min A   Sit to supine: Min A    Functional Transfers Sit to stand: NT   Stand to sit: NT   Stand pivot: NT   Pt too lethargic to complete this date   NT  Too lethargic  Min A    Functional Mobility NT 2/2 safety concerns   NT Min A   with AAD    Balance Sitting: Max A      Standing: NT   Sitting: Max A  Standing: NT Sitting: Stand by assist       Standing: Min A    Activity Tolerance Poor  fatigue   Poor  Lethargic   96-97% 5L O2  -107 Fair +   Visual/  Perceptual no glasses present                    BUE  ROM/Strength/  Fine motor Coordination Hand dominance: right      RUE: ROM impaired     Strength: grossly 0/5      Strength: impaired     Coordination:  impaired     LUE: ROM impaired     Strength: grossly 2+/5      Strength: impaired     Coordination: impaired   Pt will participate in BUE exercise with supervision to increase strength, ROM, and coordination for increased independence in functional mobility and ADLs    Safety   Poor  Poor Fair during functional activity      Education:  Pt was educated through out treatment regarding proper technique & safety with bed mobility, sitting balance/tolerance, increasing alertness, importance of positioning & ROM exercises with family present & ADL retraining with compensatory

## 2024-10-17 NOTE — PLAN OF CARE
Problem: Discharge Planning  Goal: Discharge to home or other facility with appropriate resources  Outcome: Progressing     Problem: Safety - Adult  Goal: Free from fall injury  Outcome: Progressing     Problem: ABCDS Injury Assessment  Goal: Absence of physical injury  Outcome: Progressing     Problem: Skin/Tissue Integrity  Goal: Absence of new skin breakdown  Description: 1.  Monitor for areas of redness and/or skin breakdown  2.  Assess vascular access sites hourly  3.  Every 4-6 hours minimum:  Change oxygen saturation probe site  4.  Every 4-6 hours:  If on nasal continuous positive airway pressure, respiratory therapy assess nares and determine need for appliance change or resting period.  Outcome: Progressing     Problem: Nutrition Deficit:  Goal: Optimize nutritional status  Outcome: Progressing  Flowsheets (Taken 10/16/2024 1347 by Kiki Lentz, RD, LD)  Nutrient intake appropriate for improving, restoring, or maintaining nutritional needs: Assess nutritional status and recommend course of action     Problem: Neurosensory - Adult  Goal: Achieves stable or improved neurological status  Outcome: Progressing  Goal: Absence of seizures  Outcome: Progressing  Goal: Achieves maximal functionality and self care  Outcome: Progressing

## 2024-10-17 NOTE — PROGRESS NOTES
Coordination of care discussion and chart review with PM team. Pt seen at bedside, he does awaken briefly. His wife, son grandson and niece are present. Family decided to proceed with peg and are awaiting it to be scheduled. No immediate needs at this time.

## 2024-10-17 NOTE — PROGRESS NOTES
Daughter at the bedside stating family has made the decision to move forward with peg tube placement. Charge RN notified and will inform  Dr. Ledesma

## 2024-10-17 NOTE — PROGRESS NOTES
Notified [facility] that the pt's family has agreed to go forward with a peg tube.    Also notified Dr. Ledesma of pt's tsh

## 2024-10-17 NOTE — PROGRESS NOTES
Order to obtain consent for PEG noted. Wife and family at the bedside, wish to speak to someone from surgery before signing consent. Surgery resident notified via perfect serve.

## 2024-10-17 NOTE — PROGRESS NOTES
Neuro Inpatient Follow Up Note       Juan J Choudhury is a 83 y.o.  male     Neuro is following for AMS and fall    PMH: Prior stroke, vascular dementia, PVD, COPD, CKD, history of prostatic carcinoma, hypothyroidism, active smoker    Patient presented to ED on 10/10 after having a fall about 30 minutes prior to arrival.  After the fall patient was confused.  Patient was unable to state his name or answer any questions appropriately.  He will follow some commands and appeared to have equal strength in all 4 extremities.  Per EMS he was oriented x 3 with them.  Patient goes out to smoke on his porch every night and on the day of arrival patient was doing so when he had a fall.  Per ED documentation, patient's daughter reported being off balance and seeming to decline over the past few days including staring off into space.    CT head shows small left basal ganglia hemorrhage.  CTA head and neck showed moderate stenosis in the right M1, left A2 and 60 to 70% stenosis in the proximal right ICA.  Patient also had high-grade stenosis in the left subclavian.  MRI brain showed an acute left subcortical stroke involving the left corona radiata down to the basal ganglia with small region of hemorrhage involving the left external capsule    Thoughts of PEG tube versus hospice.  Palliative also following.  Patient remains lethargic with intermittent fevers with tachycardia and leukocytosis.  Chest x-ray showed no new infiltrates.    Vital signs are stable    Patient's wife and son are present at bedside currently    Objective:     /75   Pulse 91   Temp 98.2 °F (36.8 °C) (Axillary)   Resp 20   Ht 1.702 m (5' 7.01\")   Wt 61 kg (134 lb 7.7 oz)   SpO2 94%   BMI 21.06 kg/m²     General appearance: Lethargic, appears stated age, cooperative and no distress  Head: normocephalic, without obvious abnormality, atraumatic  Eyes: conjunctivae/corneas clear. .  Neck:  supple, symmetrical, trachea midline   Lungs: Unlabored breaths  the neck recommended.   CTA HEAD:      1. Moderate stenosis in the distal M1 segment of the right middle cerebral   artery.   2. Moderate stenoses in the A2 segment of the left anterior cerebral artery.   3. No significant stenosis in the posterior circulation.         CTA NECK W CONTRAST   Final Result   CTA NECK:      1. 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   2. Diminished flow related enhancement in the left vertebral artery and the   distal left subclavian artery.  The site of the severe stenosis in the left   subclavian artery is obscured by beam hardening artifact.   3. Approximately 60-70% stenosis of the proximal right internal carotid   artery.   4. Moderate to severe stenosis of the origin of the right vertebral artery.   5. 1.8 x 1.2 cm hypodense lesion in the right parotid gland. Further   evaluation with pre and post-contrast enhanced MRI of the neck recommended.   CTA HEAD:      1. Moderate stenosis in the distal M1 segment of the right middle cerebral   artery.   2. Moderate stenoses in the A2 segment of the left anterior cerebral artery.   3. No significant stenosis in the posterior circulation.         CT CERVICAL SPINE WO CONTRAST   Final Result   No acute abnormality of the cervical spine.                    Complete echo 10/14    Left Ventricle: The left ventricle is normal in size with moderate increased thickness of endocardial surfaces.  Echocardiographic contrast was administered to enhance endocardial definition.  No regional wall motion abnormalities are identified with overall normal left ventricular systolic function.  An estimated ejection fraction of 55-60 % is calculated.  Indeterminate diastolic function is present    Tricuspid Valve: The tricuspid valve is suboptimally visualized with mild tricuspid regurgitation    Left Atrium: The left atrium is normal in size.  The interatrial septum is intact no evidence of an intracardiac shunt is identified on a

## 2024-10-17 NOTE — CARE COORDINATION
Here for Intracranial hemorrhage Failed mbss. Hospice consult and general surgery awaiting family to decision for peg or not. Discharge plan -Shaylee She has insurance auth for him good thru 10/22[facility] . Return call to Yaritza mortensen @ [facility] and update given x [phone]. Await hospice meeting with family and family decision for discharge plan..Electronically signed by Melissa Farias RN on 10/17/2024 at 10:21 AM    Family now wanting peg tube placed.Electronically signed by Melissa Farias RN on 10/17/2024 at 3:41 PM

## 2024-10-17 NOTE — PROGRESS NOTES
Neurosurg progress note  VITALS:  /70   Pulse (!) 103   Temp 99.2 °F (37.3 °C) (Axillary)   Resp 24   Ht 1.702 m (5' 7.01\")   Wt 61 kg (134 lb 7.7 oz)   SpO2 93%   BMI 21.06 kg/m²   24HR INTAKE/OUTPUT:  No intake or output data in the 24 hours ending 10/17/24 0726  MRI BRAIN WO CONTRAST    Result Date: 10/11/2024  EXAMINATION: MRI OF THE BRAIN WITHOUT CONTRAST  10/11/2024 3:39 pm TECHNIQUE: Multiplanar multisequence MRI of the brain was performed without the administration of intravenous contrast. COMPARISON: None. HISTORY: ORDERING SYSTEM PROVIDED HISTORY: Concern for CVA, AMS GCS 14, expressive aphasia, fall with small L IPH TECHNOLOGIST PROVIDED HISTORY: Reason for exam:->Concern for CVA, AMS GCS 14, expressive aphasia, fall with small L IPH What is the sedation requirement?->None What reading provider will be dictating this exam?->CRC FINDINGS: INTRACRANIAL STRUCTURES/VENTRICLES: There is an acute or early subacute infarction extending from the left corona radiata into the superior basal ganglia and the periventricular white matter.  The infarction measures approximately 2.2 x 1.5 cm in the maximal axial plane. There is a small, 5 mm focus of acute or subacute hemorrhage in the left subinsular region.  No mass effect or midline shift.  Moderate generalized cerebral volume loss.  Severe chronic microvascular ischemic changes. No hydrocephalus or extra-axial fluid. ORBITS: Prosthetic lenses are seen in the globes bilaterally.  The orbits are otherwise grossly unremarkable. SINUSES: The visualized paranasal sinuses and mastoid air cells demonstrate no acute abnormality. BONES/SOFT TISSUES: The bone marrow signal intensity appears normal. The soft tissues demonstrate no acute abnormality.     1. Acute or early subacute INFARCTION extending from the left corona radiata into the superior basal ganglia and the periventricular white matter. 2. Small, 5 mm focus of acute or subacute HEMORRHAGE in the left  In the setting of trauma, could be a small contusion. No significant mass effect or midline shift. 2. Right parietal scalp soft tissue injury. 3. Age related changes of brain volume loss and nonspecific white matter hypodensity most often ascribed to chronic small vessel ischemia.     CT CERVICAL SPINE WO CONTRAST    Result Date: 10/10/2024  EXAMINATION: CT OF THE CERVICAL SPINE WITHOUT CONTRAST 10/10/2024 9:01 pm TECHNIQUE: CT of the cervical spine was performed without the administration of intravenous contrast. Multiplanar reformatted images are provided for review. Automated exposure control, iterative reconstruction, and/or weight based adjustment of the mA/kV was utilized to reduce the radiation dose to as low as reasonably achievable. COMPARISON: None. HISTORY: ORDERING SYSTEM PROVIDED HISTORY: fall; concern for fx vs subluxation TECHNOLOGIST PROVIDED HISTORY: Reason for exam:->fall; concern for fx vs subluxation Decision Support Exception - unselect if not a suspected or confirmed emergency medical condition->Emergency Medical Condition (MA) What reading provider will be dictating this exam?->CRC FINDINGS: BONES/ALIGNMENT: There is no acute fracture or traumatic malalignment. DEGENERATIVE CHANGES: Multilevel degenerative spondylosis greatest at C5-6 and C6-7. SOFT TISSUES: There is no prevertebral soft tissue swelling.     No acute abnormality of the cervical spine.     CBC:   Lab Results   Component Value Date/Time    WBC 7.6 10/17/2024 05:43 AM    RBC 4.94 10/17/2024 05:43 AM    HGB 14.5 10/17/2024 05:43 AM    HCT 42.7 10/17/2024 05:43 AM    MCV 86.4 10/17/2024 05:43 AM    MCH 29.4 10/17/2024 05:43 AM    MCHC 34.0 10/17/2024 05:43 AM    RDW 12.7 10/17/2024 05:43 AM     10/17/2024 05:43 AM    MPV 9.3 10/17/2024 05:43 AM     BMP:    Lab Results   Component Value Date/Time     10/16/2024 08:45 AM    K 3.9 10/16/2024 08:45 AM    CL 99 10/16/2024 08:45 AM    CO2 26 10/16/2024 08:45 AM    BUN 25

## 2024-10-17 NOTE — PROGRESS NOTES
Notified WILY Cheung with hospitals of plan for peg tube and current plan of care. WILY Cheung stated to reach out to hospitals should the family request assistance moving forward

## 2024-10-17 NOTE — PROGRESS NOTES
Physical Therapy  Physical Therapy Treatment    Name: Juan J Choudhury  : 1940  MRN: [id number]      Date of Service: 10/17/2024    Evaluating PT:  Dolores Dominguez PT, DPT [id number]    Room #:  8506/8506-B  Diagnosis:  Intracranial hemorrhage (HCC) [I62.9]  Injury of head, initial encounter [S09.90XA]  Fall, initial encounter [W19.XXXA]  Laceration of scalp, initial encounter [S01.01XA]  PMHx/PSHx:   has no past medical history on file.   has no past surgical history on file.  Procedure/Surgery:  EEG (10/16/2024)  Precautions:  Fall risk, alarms, cognition, Hx of dementia, L IPH, incontinent, lethargic, monitor HR, O2  Equipment Needs:  TBD    SUBJECTIVE:    Pt's home setup is unknown. He was unable to provide due to cognitive deficits.    OBJECTIVE:   Initial Evaluation  Date: 10/13/24 Treatment Date: 10/17/2024 Short Term/ Long Term   Goals   AM-PAC 6 Clicks     Was pt agreeable to Eval/treatment? Yes Yes    Does pt have pain? Denied None reported    Bed Mobility  Rolling: NT  Supine to sit: ModA  Sit to supine: NT  Scooting: ModA Rolling: Dependent  Supine to sit: Dependent x2  Sit to supine: Dependent x2  Scooting: Dependent Rolling: Independent  Supine to sit: Independent  Sit to supine: Independent  Scooting: Independent   Transfers Sit to stand: ModA  Stand to sit: ModA  Stand pivot: ModA with FWW Sit to stand: NT  Stand to sit: NT  Stand pivot: NT Sit to stand: Independent  Stand to sit: Independent  Stand pivot: SBA with AAD   Ambulation    70 feet x3 with FWW and ModA NT >400 feet with AAD and SBA   Stair negotiation: ascended and descended  NT NT TBD   ROM BUE:  Refer to OT  BLE:  WFL     Strength BUE:  Refer to OT  BLE:  Not formally assessed due to difficulty with command follow     Balance Sitting EOB:  Ishaan  Dynamic Standing:  ModA with FWW Sitting EOB:  MaxA  Dynamic Standing:  NT Sitting EOB:  Independent  Dynamic Standing:  SBA with AAD     Pt is A & O x 1 (self). Pt minimally verbal

## 2024-10-17 NOTE — PROGRESS NOTES
Please call us back when family makes decision and s agreeable to PEG tube. Thanks.     Electronically signed by Aida Eric DO on 10/17/2024 at 8:59 AM

## 2024-10-18 ENCOUNTER — ANESTHESIA EVENT (OUTPATIENT)
Dept: ENDOSCOPY | Age: 84
End: 2024-10-18
Payer: MEDICARE

## 2024-10-18 ENCOUNTER — ANESTHESIA (OUTPATIENT)
Dept: ENDOSCOPY | Age: 84
End: 2024-10-18
Payer: MEDICARE

## 2024-10-18 ENCOUNTER — APPOINTMENT (OUTPATIENT)
Dept: CT IMAGING | Age: 84
DRG: 040 | End: 2024-10-18
Payer: MEDICARE

## 2024-10-18 LAB
ALBUMIN SERPL-MCNC: 2.6 G/DL (ref 3.5–5.2)
ALP SERPL-CCNC: 73 U/L (ref 40–129)
ALT SERPL-CCNC: 8 U/L (ref 0–40)
ANION GAP SERPL CALCULATED.3IONS-SCNC: 8 MMOL/L (ref 7–16)
AST SERPL-CCNC: 15 U/L (ref 0–39)
BASOPHILS # BLD: 0.02 K/UL (ref 0–0.2)
BASOPHILS NFR BLD: 0 % (ref 0–2)
BILIRUB SERPL-MCNC: 0.6 MG/DL (ref 0–1.2)
BUN SERPL-MCNC: 22 MG/DL (ref 6–23)
CALCIUM SERPL-MCNC: 8.6 MG/DL (ref 8.6–10.2)
CHLORIDE SERPL-SCNC: 112 MMOL/L (ref 98–107)
CO2 SERPL-SCNC: 21 MMOL/L (ref 22–29)
CREAT SERPL-MCNC: 1.2 MG/DL (ref 0.7–1.2)
EOSINOPHIL # BLD: 0.01 K/UL (ref 0.05–0.5)
EOSINOPHILS RELATIVE PERCENT: 0 % (ref 0–6)
ERYTHROCYTE [DISTWIDTH] IN BLOOD BY AUTOMATED COUNT: 12.9 % (ref 11.5–15)
GFR, ESTIMATED: 62 ML/MIN/1.73M2
GLUCOSE SERPL-MCNC: 144 MG/DL (ref 74–99)
HCT VFR BLD AUTO: 39.2 % (ref 37–54)
HGB BLD-MCNC: 12.7 G/DL (ref 12.5–16.5)
IMM GRANULOCYTES # BLD AUTO: <0.03 K/UL (ref 0–0.58)
IMM GRANULOCYTES NFR BLD: 0 % (ref 0–5)
LYMPHOCYTES NFR BLD: 0.48 K/UL (ref 1.5–4)
LYMPHOCYTES RELATIVE PERCENT: 8 % (ref 20–42)
MCH RBC QN AUTO: 29.3 PG (ref 26–35)
MCHC RBC AUTO-ENTMCNC: 32.4 G/DL (ref 32–34.5)
MCV RBC AUTO: 90.5 FL (ref 80–99.9)
MONOCYTES NFR BLD: 0.51 K/UL (ref 0.1–0.95)
MONOCYTES NFR BLD: 8 % (ref 2–12)
NEUTROPHILS NFR BLD: 83 % (ref 43–80)
NEUTS SEG NFR BLD: 5.09 K/UL (ref 1.8–7.3)
PLATELET # BLD AUTO: 217 K/UL (ref 130–450)
PMV BLD AUTO: 9.4 FL (ref 7–12)
POTASSIUM SERPL-SCNC: 3.7 MMOL/L (ref 3.5–5)
PROT SERPL-MCNC: 5.5 G/DL (ref 6.4–8.3)
RBC # BLD AUTO: 4.33 M/UL (ref 3.8–5.8)
RBC # BLD: ABNORMAL 10*6/UL
SODIUM SERPL-SCNC: 141 MMOL/L (ref 132–146)
WBC OTHER # BLD: 6.1 K/UL (ref 4.5–11.5)

## 2024-10-18 PROCEDURE — 7100000000 HC PACU RECOVERY - FIRST 15 MIN: Performed by: SURGERY

## 2024-10-18 PROCEDURE — 3609013300 HC EGD TUBE PLACEMENT: Performed by: SURGERY

## 2024-10-18 PROCEDURE — 99232 SBSQ HOSP IP/OBS MODERATE 35: CPT | Performed by: NURSE PRACTITIONER

## 2024-10-18 PROCEDURE — 70450 CT HEAD/BRAIN W/O DYE: CPT

## 2024-10-18 PROCEDURE — 6370000000 HC RX 637 (ALT 250 FOR IP): Performed by: INTERNAL MEDICINE

## 2024-10-18 PROCEDURE — 7100000001 HC PACU RECOVERY - ADDTL 15 MIN: Performed by: SURGERY

## 2024-10-18 PROCEDURE — 2580000003 HC RX 258: Performed by: INTERNAL MEDICINE

## 2024-10-18 PROCEDURE — 43246 EGD PLACE GASTROSTOMY TUBE: CPT | Performed by: SURGERY

## 2024-10-18 PROCEDURE — 94640 AIRWAY INHALATION TREATMENT: CPT

## 2024-10-18 PROCEDURE — 2709999900 HC NON-CHARGEABLE SUPPLY: Performed by: SURGERY

## 2024-10-18 PROCEDURE — 3700000000 HC ANESTHESIA ATTENDED CARE: Performed by: SURGERY

## 2024-10-18 PROCEDURE — 85025 COMPLETE CBC W/AUTO DIFF WBC: CPT

## 2024-10-18 PROCEDURE — 6360000002 HC RX W HCPCS: Performed by: INTERNAL MEDICINE

## 2024-10-18 PROCEDURE — 2700000000 HC OXYGEN THERAPY PER DAY

## 2024-10-18 PROCEDURE — 3700000001 HC ADD 15 MINUTES (ANESTHESIA): Performed by: SURGERY

## 2024-10-18 PROCEDURE — 80053 COMPREHEN METABOLIC PANEL: CPT

## 2024-10-18 PROCEDURE — 0DH63UZ INSERTION OF FEEDING DEVICE INTO STOMACH, PERCUTANEOUS APPROACH: ICD-10-PCS | Performed by: SURGERY

## 2024-10-18 PROCEDURE — 36415 COLL VENOUS BLD VENIPUNCTURE: CPT

## 2024-10-18 PROCEDURE — 6360000002 HC RX W HCPCS

## 2024-10-18 PROCEDURE — 6370000000 HC RX 637 (ALT 250 FOR IP): Performed by: PSYCHIATRY & NEUROLOGY

## 2024-10-18 PROCEDURE — 2060000000 HC ICU INTERMEDIATE R&B

## 2024-10-18 PROCEDURE — 6360000002 HC RX W HCPCS: Performed by: NURSE PRACTITIONER

## 2024-10-18 RX ORDER — PROPOFOL 10 MG/ML
INJECTION, EMULSION INTRAVENOUS
Status: DISCONTINUED | OUTPATIENT
Start: 2024-10-18 | End: 2024-10-18 | Stop reason: SDUPTHER

## 2024-10-18 RX ADMIN — PROPOFOL 100 MG: 10 INJECTION, EMULSION INTRAVENOUS at 14:25

## 2024-10-18 RX ADMIN — LEVETIRACETAM 250 MG: 100 INJECTION INTRAVENOUS at 07:41

## 2024-10-18 RX ADMIN — FINASTERIDE 5 MG: 5 TABLET, FILM COATED ORAL at 20:33

## 2024-10-18 RX ADMIN — AMPICILLIN SODIUM AND SULBACTAM SODIUM 3000 MG: 2; 1 INJECTION, POWDER, FOR SOLUTION INTRAMUSCULAR; INTRAVENOUS at 17:34

## 2024-10-18 RX ADMIN — IPRATROPIUM BROMIDE AND ALBUTEROL SULFATE 1 DOSE: 2.5; .5 SOLUTION RESPIRATORY (INHALATION) at 20:43

## 2024-10-18 RX ADMIN — DEXTROSE AND SODIUM CHLORIDE: 5; 900 INJECTION, SOLUTION INTRAVENOUS at 21:23

## 2024-10-18 RX ADMIN — ATORVASTATIN CALCIUM 80 MG: 80 TABLET, FILM COATED ORAL at 20:33

## 2024-10-18 RX ADMIN — Medication 2000 UNITS: at 17:35

## 2024-10-18 RX ADMIN — LEVETIRACETAM 250 MG: 100 INJECTION INTRAVENOUS at 20:33

## 2024-10-18 RX ADMIN — IPRATROPIUM BROMIDE AND ALBUTEROL SULFATE 1 DOSE: 2.5; .5 SOLUTION RESPIRATORY (INHALATION) at 11:05

## 2024-10-18 RX ADMIN — AMPICILLIN SODIUM AND SULBACTAM SODIUM 3000 MG: 2; 1 INJECTION, POWDER, FOR SOLUTION INTRAMUSCULAR; INTRAVENOUS at 07:03

## 2024-10-18 RX ADMIN — IPRATROPIUM BROMIDE AND ALBUTEROL SULFATE 1 DOSE: 2.5; .5 SOLUTION RESPIRATORY (INHALATION) at 16:59

## 2024-10-18 ASSESSMENT — PAIN SCALES - PAIN ASSESSMENT IN ADVANCED DEMENTIA (PAINAD)
CONSOLABILITY: NO NEED TO CONSOLE
BREATHING: OCCASIONAL LABORED BREATHING, SHORT PERIOD OF HYPERVENTILATION
TOTALSCORE: 0
BREATHING: NORMAL
FACIALEXPRESSION: SMILING OR INEXPRESSIVE
BODYLANGUAGE: RELAXED
BODYLANGUAGE: RELAXED
CONSOLABILITY: NO NEED TO CONSOLE
TOTALSCORE: 1
FACIALEXPRESSION: SMILING OR INEXPRESSIVE

## 2024-10-18 NOTE — ANESTHESIA POSTPROCEDURE EVALUATION
Department of Anesthesiology  Postprocedure Note    Patient: Juan J Choudhury  MRN: [id number]  YOB: 1940  Date of evaluation: 10/18/2024    Procedure Summary       Date: 10/18/24 Room / Location: [place] [id number] / [facility]    Anesthesia Start: 1423 Anesthesia Stop: 1440    Procedure: ESOPHAGOGASTRODUODENOSCOPY PERCUTANEOUS ENDOSCOPIC GASTROSTOMY TUBE INSERTION Diagnosis:       Dysphagia, unspecified type      (Dysphagia, unspecified type [R13.10])    Surgeons: John Nava MD Responsible Provider: Eda Andersen MD    Anesthesia Type: MAC ASA Status: 3            Anesthesia Type: MAC    Tereso Phase I: Tereso Score: 8    Tereso Phase II:      Anesthesia Post Evaluation    Patient location during evaluation: PACU  Patient participation: complete - patient participated  Level of consciousness: awake  Airway patency: patent  Nausea & Vomiting: no nausea and no vomiting  Cardiovascular status: hemodynamically stable  Respiratory status: acceptable  Hydration status: euvolemic  Pain management: adequate    No notable events documented.

## 2024-10-18 NOTE — ANESTHESIA PRE PROCEDURE
Department of Anesthesiology  Preprocedure Note       Name:  Juan J Choudhury   Age:  83 y.o.  :  1940                                          MRN:  [id number]         Date:  10/18/2024      Surgeon: Surgeon(s):  John Nava MD    Procedure: Procedure(s):  ESOPHAGOGASTRODUODENOSCOPY PERCUTANEOUS ENDOSCOPIC GASTROSTOMY TUBE INSERTION    Medications prior to admission:   Prior to Admission medications    Medication Sig Start Date End Date Taking? Authorizing Provider   levothyroxine (SYNTHROID) 75 MCG tablet Take 1 tablet by mouth Daily    Haley Toure MD   clopidogrel (PLAVIX) 75 MG tablet Take 1 tablet by mouth daily    Haley Toure MD   finasteride (PROSCAR) 5 MG tablet Take 1 tablet by mouth daily    Haley Toure MD   vitamin D (CHOLECALCIFEROL) 50 MCG (2000) TABS tablet Take 1 tablet by mouth daily    Haley Toure MD       Current medications:    Current Facility-Administered Medications   Medication Dose Route Frequency Provider Last Rate Last Admin    ampicillin-sulbactam (UNASYN) 3,000 mg in sodium chloride 0.9 % 100 mL IVPB ([id number])  3,000 mg IntraVENous Q6H Margarito Ledesma MD   Stopped at 10/18/24 0733    ipratropium 0.5 mg-albuterol 2.5 mg (DUONEB) nebulizer solution 1 Dose  1 Dose Inhalation Q4H RT Margarito Ledesma MD   1 Dose at 10/18/24 1105    dextrose 5 % and 0.9 % sodium chloride infusion   IntraVENous Continuous Margarito Ledesma  mL/hr at 10/17/24 2157 New Bag at 10/17/24 2157    acetaminophen (TYLENOL) suppository 650 mg  650 mg Rectal Q4H PRN Margarito Ledesma MD   650 mg at 10/17/24 0907    levETIRAcetam (KEPPRA) injection 250 mg  250 mg IntraVENous Q12H Melina David APRN - NP   250 mg at 10/18/24 0741    perflutren lipid microspheres (DEFINITY) injection 1.5 mL  1.5 mL IntraVENous ONCE PRN Angelita Danielson APRN - CNP        hydrALAZINE (APRESOLINE) injection 10 mg  10 mg IntraVENous Q4H PRN Margarito Ledesma MD

## 2024-10-18 NOTE — PROGRESS NOTES
Plan for PEG tube placement today.  Patient currently on 5 L nasal cannula.  Unfortunately consent has not yet been signed by wife.  Went to go speak with wife yesterday evening but she had just recently left.  Will call this a.m. to discuss procedure and have consent signed.    Electronically signed by Justyna Valdez DO on 10/18/2024 at 6:11 AM

## 2024-10-18 NOTE — OP NOTE
Operative Note      Patient: Juan J Choudhury  YOB: 1940  MRN: [id number]    Date of Procedure: 10/18/2024    Pre-Op Diagnosis Codes:      * Dysphagia, unspecified type [R13.10]    Post-Op Diagnosis: Same       Procedure(s):  ESOPHAGOGASTRODUODENOSCOPY PERCUTANEOUS ENDOSCOPIC GASTROSTOMY TUBE INSERTION    Surgeon(s):  John Nava MD    Assistant:   * No surgical staff found *    Anesthesia: Monitor Anesthesia Care    Estimated Blood Loss (mL): Minimal    Complications: None    Specimens:   * No specimens in log *    Implants:  * No implants in log *      Drains:   Gastrostomy/Enterostomy/Jejunostomy Tube Percutaneous Endoscopic Gastrostomy (PEG) LUQ 20 fr (Active)       Findings:  none    Detailed Description of Procedure:         DATE OF PROCEDURE: 10/18/2024     PREOPERATIVE DIAGNOSIS: dysphagia     POSTOPERATIVE DIAGNOSIS/FINDINGS: same    SURGEON: Dr. Gena Fernandez    ASSISTANT: None    OPERATION: 1. Esophagogastroduodenoscopy 2. Percutaneous Endoscopic Gastrostomy (PEG)  placement    ANESTHESIA: Local monitored anesthesia.     COMPLICATIONS: None.     ESTIMATED BLOOD LOSS: 5 ml     SPECIMENS: none    PRIOR TO EXAM: Gen: comfortable, no distress, awake and alert; Lungs: Clear;  Heart:regular rate and rhythm, normal S1S2     BRIEF HISTORY:  This is a 83 y.o. male who presents with the complaint of dysphagia . My recommendation is to proceed with esophagogastroduodenoscopy/ PEG tube placement. The patient was advised of the risks, benefits, complications and options including the risk of bleeding and perforation. The patient understood and agreed to proceed.    Under LMAC anesthesia, the patient was positioned in the left side down decubitus position. A bite block was inserted. Under direct visualization the scope was passed through the oral cavity, into the esophagus and then into the stomach. The scope was then passed through a normal appearing pylorus into the duodenum. The proximal

## 2024-10-18 NOTE — PROGRESS NOTES
Neuro Inpatient Follow Up Note       Juan J Choudhury is a 83 y.o.  male     Neuro is following for AMS and fall    PMH: Prior stroke, vascular dementia, PVD, COPD, CKD, history of prostatic carcinoma, hypothyroidism, active smoker    Patient presented to ED on 10/10 after having a fall about 30 minutes prior to arrival.  After the fall patient was confused.  Patient was unable to state his name or answer any questions appropriately.  He will follow some commands and appeared to have equal strength in all 4 extremities.  Per EMS he was oriented x 3 with them.  Patient goes out to smoke on his porch every night and on the day of arrival patient was doing so when he had a fall.  Per ED documentation, patient's daughter reported being off balance and seeming to decline over the past few days including staring off into space.    CT head shows small left basal ganglia hemorrhage.  CTA head and neck showed moderate stenosis in the right M1, left A2 and 60 to 70% stenosis in the proximal right ICA.  Patient also had high-grade stenosis in the left subclavian.  MRI brain showed an acute left subcortical stroke involving the left corona radiata down to the basal ganglia with small region of hemorrhage involving the left external capsule    Plans for PEG placement today.  Palliative also following.  Hospice signed off.  Patient remains lethargic with intermittent fevers with tachycardia and leukocytosis.  Chest x-ray on 10/16 showed no new infiltrates.    Vital signs are stable.  At some point patient began having increased cough and additional O2 requirements chest x-ray on 10/17 now consistent with developing pneumonia    No family is present at bedside during my visit today    Objective:     BP (!) 122/44   Pulse 63   Temp 97.4 °F (36.3 °C) (Temporal)   Resp 16   Ht 1.702 m (5' 7.01\")   Wt 61 kg (134 lb 7.7 oz)   SpO2 99%   BMI 21.06 kg/m²     General appearance: Lethargic, appears stated age, cooperative and no

## 2024-10-18 NOTE — PROGRESS NOTES
Comprehensive Nutrition Assessment    Type and Reason for Visit:  Reassess (TF rec)    Nutrition Recommendations/Plan:   Continue NPO per SLP recs  Anticipating PEG placement; TF rec provided, recommend,    Standard w/ Fiber (Jevity 1.5) @ 45ml/hr + 1 pro mod x 23 hrs (holding 30min before/after Synthroid) to provide 1035ml TV, 1553kcals, 66g pro (w/ 1 pro mod is 1657cals, 92g pro), 787ml free water.    Flush rec if needed is, 145ml q 8=604pz water (1657ml total water).    Will monitor; monitor for refeeding syndrome, Monitor/replace Lytes PRN.      Malnutrition Assessment:  Malnutrition Status:  Severe malnutrition (10/16/24 1354)    Context:  Chronic Illness     Findings of the 6 clinical characteristics of malnutrition:  Energy Intake:  75% or less estimated energy requirements for 1 month or longer (2/2 advanced age; family at bedside report pt's appetite/oral intake PTA were \"okay\")  Weight Loss:  Unable to assess (d/t lack of wt hx per EMR)     Body Fat Loss:  Severe body fat loss Orbital, Triceps, Buccal region   Muscle Mass Loss:  Severe muscle mass loss Temples (temporalis), Clavicles (pectoralis & deltoids), Hand (interosseous)  Fluid Accumulation:  No significant fluid accumulation     Strength:  Not Performed    Nutrition Assessment:    pt adm d/t AMS/fall w/ scalp laceration and intracranial hemorrhage; no PMhx on file; SLP recommend soft/bite size solids and thin liquids on 10/12; pt s/p CT showing hemorrhage slightly enlarged; lethargy this adm noted; pt meets criteria for severe malnutrition; pt s/p MBSS 10/16, SLP rec NPO; family agreeable to PEG tube placement, anticipating placement today 10/18; HOTV signed off; TF recs provided; will continue to monitor.    Nutrition Related Findings:    K WNL; alert; oriented to person (oriented/disoriented at times); abd WNL; no edema Wound Type: Multiple (traumatic wounds x 2 noted per doc flow)       Current Nutrition Intake & Therapies:    Average Meal

## 2024-10-18 NOTE — PROGRESS NOTES
Increased cough and oxygen requirement   CXR now suggestive of developing pneumonia   Afebrile , vs more stable  RRR with distant tones  Increased bilateral rhonchi  Have begun parenteral ATB

## 2024-10-18 NOTE — PLAN OF CARE
Problem: Discharge Planning  Goal: Discharge to home or other facility with appropriate resources  Outcome: Progressing     Problem: Safety - Adult  Goal: Free from fall injury  Outcome: Progressing     Problem: ABCDS Injury Assessment  Goal: Absence of physical injury  Outcome: Progressing     Problem: Skin/Tissue Integrity  Goal: Absence of new skin breakdown  Description: 1.  Monitor for areas of redness and/or skin breakdown  2.  Assess vascular access sites hourly  3.  Every 4-6 hours minimum:  Change oxygen saturation probe site  4.  Every 4-6 hours:  If on nasal continuous positive airway pressure, respiratory therapy assess nares and determine need for appliance change or resting period.  Outcome: Progressing     Problem: Nutrition Deficit:  Goal: Optimize nutritional status  Outcome: Progressing     Problem: Neurosensory - Adult  Goal: Achieves stable or improved neurological status  Outcome: Progressing  Goal: Absence of seizures  Outcome: Progressing  Goal: Achieves maximal functionality and self care  Outcome: Progressing     Problem: Pain  Goal: Verbalizes/displays adequate comfort level or baseline comfort level  Outcome: Progressing

## 2024-10-18 NOTE — PLAN OF CARE
Problem: Safety - Adult  Goal: Free from fall injury  10/18/2024 1110 by Yaritza Henriquez RN  Outcome: Progressing  10/18/2024 0156 by Melissa Harrell RN  Outcome: Progressing     Problem: ABCDS Injury Assessment  Goal: Absence of physical injury  10/18/2024 1110 by Yaritza Henriquez RN  Outcome: Progressing  10/18/2024 0156 by Melissa Harrell RN  Outcome: Progressing     Problem: Skin/Tissue Integrity  Goal: Absence of new skin breakdown  Description: 1.  Monitor for areas of redness and/or skin breakdown  2.  Assess vascular access sites hourly  3.  Every 4-6 hours minimum:  Change oxygen saturation probe site  4.  Every 4-6 hours:  If on nasal continuous positive airway pressure, respiratory therapy assess nares and determine need for appliance change or resting period.  10/18/2024 1110 by Yaritza Henriquez RN  Outcome: Progressing  10/18/2024 0156 by Melissa Harrell RN  Outcome: Progressing

## 2024-10-18 NOTE — CARE COORDINATION
Currently on 5 liters oxygen and started on IV unasyn.Family has decided on peg tube. HOTV signed off.Placed on weekedn PT/OT. Discharge plan - [facility],Alphonso Beltran -she has insurance auth for him good thru 10/22. [facility] ambulance form and envelope in soft chart . If gets peg tube today and medically ready over weekend can discharge there over weekend --nurses can just call building with nurse to nurse and transport time.and then fax  d/c paper work cm/sw to follow Placed on weekend PT/OT .Electronically signed by Melissa Farias RN on 10/18/2024 at 10:34 AM

## 2024-10-19 LAB
ALBUMIN SERPL-MCNC: 2.7 G/DL (ref 3.5–5.2)
ALP SERPL-CCNC: 68 U/L (ref 40–129)
ALT SERPL-CCNC: 22 U/L (ref 0–40)
ANION GAP SERPL CALCULATED.3IONS-SCNC: 9 MMOL/L (ref 7–16)
AST SERPL-CCNC: 35 U/L (ref 0–39)
BASOPHILS # BLD: 0 K/UL (ref 0–0.2)
BASOPHILS NFR BLD: 0 % (ref 0–2)
BILIRUB SERPL-MCNC: 0.6 MG/DL (ref 0–1.2)
BUN SERPL-MCNC: 18 MG/DL (ref 6–23)
CALCIUM SERPL-MCNC: 8.8 MG/DL (ref 8.6–10.2)
CHLORIDE SERPL-SCNC: 114 MMOL/L (ref 98–107)
CO2 SERPL-SCNC: 23 MMOL/L (ref 22–29)
CREAT SERPL-MCNC: 1.1 MG/DL (ref 0.7–1.2)
EOSINOPHIL # BLD: 0 K/UL (ref 0.05–0.5)
EOSINOPHILS RELATIVE PERCENT: 0 % (ref 0–6)
ERYTHROCYTE [DISTWIDTH] IN BLOOD BY AUTOMATED COUNT: 13 % (ref 11.5–15)
GFR, ESTIMATED: 65 ML/MIN/1.73M2
GLUCOSE SERPL-MCNC: 149 MG/DL (ref 74–99)
HCT VFR BLD AUTO: 35.2 % (ref 37–54)
HGB BLD-MCNC: 11.6 G/DL (ref 12.5–16.5)
LYMPHOCYTES NFR BLD: 0.33 K/UL (ref 1.5–4)
LYMPHOCYTES RELATIVE PERCENT: 5 % (ref 20–42)
MCH RBC QN AUTO: 29.4 PG (ref 26–35)
MCHC RBC AUTO-ENTMCNC: 33 G/DL (ref 32–34.5)
MCV RBC AUTO: 89.3 FL (ref 80–99.9)
MONOCYTES NFR BLD: 0.22 K/UL (ref 0.1–0.95)
MONOCYTES NFR BLD: 4 % (ref 2–12)
NEUTROPHILS NFR BLD: 91 % (ref 43–80)
NEUTS SEG NFR BLD: 5.84 K/UL (ref 1.8–7.3)
PLATELET # BLD AUTO: 265 K/UL (ref 130–450)
PMV BLD AUTO: 9.5 FL (ref 7–12)
POTASSIUM SERPL-SCNC: 3.2 MMOL/L (ref 3.5–5)
PROT SERPL-MCNC: 4.9 G/DL (ref 6.4–8.3)
RBC # BLD AUTO: 3.94 M/UL (ref 3.8–5.8)
RBC # BLD: ABNORMAL 10*6/UL
SODIUM SERPL-SCNC: 146 MMOL/L (ref 132–146)
WBC OTHER # BLD: 6.4 K/UL (ref 4.5–11.5)

## 2024-10-19 PROCEDURE — 43762 RPLC GTUBE NO REVJ TRC: CPT

## 2024-10-19 PROCEDURE — 6370000000 HC RX 637 (ALT 250 FOR IP): Performed by: INTERNAL MEDICINE

## 2024-10-19 PROCEDURE — 36415 COLL VENOUS BLD VENIPUNCTURE: CPT

## 2024-10-19 PROCEDURE — 99232 SBSQ HOSP IP/OBS MODERATE 35: CPT | Performed by: NURSE PRACTITIONER

## 2024-10-19 PROCEDURE — 6360000002 HC RX W HCPCS: Performed by: NURSE PRACTITIONER

## 2024-10-19 PROCEDURE — 2700000000 HC OXYGEN THERAPY PER DAY

## 2024-10-19 PROCEDURE — 2060000000 HC ICU INTERMEDIATE R&B

## 2024-10-19 PROCEDURE — 94640 AIRWAY INHALATION TREATMENT: CPT

## 2024-10-19 PROCEDURE — 85025 COMPLETE CBC W/AUTO DIFF WBC: CPT

## 2024-10-19 PROCEDURE — 6360000002 HC RX W HCPCS: Performed by: INTERNAL MEDICINE

## 2024-10-19 PROCEDURE — 80053 COMPREHEN METABOLIC PANEL: CPT

## 2024-10-19 PROCEDURE — 2580000003 HC RX 258: Performed by: INTERNAL MEDICINE

## 2024-10-19 PROCEDURE — 6370000000 HC RX 637 (ALT 250 FOR IP): Performed by: PSYCHIATRY & NEUROLOGY

## 2024-10-19 RX ORDER — POTASSIUM CHLORIDE 1500 MG/1
40 TABLET, EXTENDED RELEASE ORAL ONCE
Status: DISCONTINUED | OUTPATIENT
Start: 2024-10-19 | End: 2024-10-19

## 2024-10-19 RX ADMIN — AMPICILLIN SODIUM AND SULBACTAM SODIUM 3000 MG: 2; 1 INJECTION, POWDER, FOR SOLUTION INTRAMUSCULAR; INTRAVENOUS at 00:18

## 2024-10-19 RX ADMIN — LEVOTHYROXINE SODIUM 75 MCG: 0.07 TABLET ORAL at 06:37

## 2024-10-19 RX ADMIN — IPRATROPIUM BROMIDE AND ALBUTEROL SULFATE 1 DOSE: 2.5; .5 SOLUTION RESPIRATORY (INHALATION) at 12:50

## 2024-10-19 RX ADMIN — IPRATROPIUM BROMIDE AND ALBUTEROL SULFATE 1 DOSE: 2.5; .5 SOLUTION RESPIRATORY (INHALATION) at 16:21

## 2024-10-19 RX ADMIN — DEXTROSE AND SODIUM CHLORIDE: 5; 900 INJECTION, SOLUTION INTRAVENOUS at 20:53

## 2024-10-19 RX ADMIN — ATORVASTATIN CALCIUM 80 MG: 80 TABLET, FILM COATED ORAL at 20:48

## 2024-10-19 RX ADMIN — IPRATROPIUM BROMIDE AND ALBUTEROL SULFATE 1 DOSE: 2.5; .5 SOLUTION RESPIRATORY (INHALATION) at 19:56

## 2024-10-19 RX ADMIN — LEVETIRACETAM 250 MG: 100 INJECTION INTRAVENOUS at 09:05

## 2024-10-19 RX ADMIN — AMPICILLIN SODIUM AND SULBACTAM SODIUM 3000 MG: 2; 1 INJECTION, POWDER, FOR SOLUTION INTRAMUSCULAR; INTRAVENOUS at 12:46

## 2024-10-19 RX ADMIN — Medication 2000 UNITS: at 09:05

## 2024-10-19 RX ADMIN — POTASSIUM BICARBONATE 40 MEQ: 782 TABLET, EFFERVESCENT ORAL at 12:42

## 2024-10-19 RX ADMIN — AMPICILLIN SODIUM AND SULBACTAM SODIUM 3000 MG: 2; 1 INJECTION, POWDER, FOR SOLUTION INTRAMUSCULAR; INTRAVENOUS at 18:34

## 2024-10-19 RX ADMIN — AMPICILLIN SODIUM AND SULBACTAM SODIUM 3000 MG: 2; 1 INJECTION, POWDER, FOR SOLUTION INTRAMUSCULAR; INTRAVENOUS at 06:36

## 2024-10-19 RX ADMIN — FINASTERIDE 5 MG: 5 TABLET, FILM COATED ORAL at 20:48

## 2024-10-19 RX ADMIN — LEVETIRACETAM 250 MG: 100 INJECTION INTRAVENOUS at 20:48

## 2024-10-19 ASSESSMENT — PAIN SCALES - GENERAL
PAINLEVEL_OUTOF10: 0

## 2024-10-19 NOTE — PROGRESS NOTES
More alert and responsive today  Afebrile , vs improved   RRR   Bilateral rhonci   Continue present RX including Unasyn   Begin TF when ok with surgery

## 2024-10-19 NOTE — PROGRESS NOTES
Neurosurg progress note  VITALS:  BP (!) 92/57   Pulse 71   Temp 97.7 °F (36.5 °C) (Oral)   Resp 18   Ht 1.702 m (5' 7.01\")   Wt 61 kg (134 lb 7.7 oz)   SpO2 97%   BMI 21.06 kg/m²   24HR INTAKE/OUTPUT:    Intake/Output Summary (Last 24 hours) at 10/19/2024 1035  Last data filed at 10/19/2024 0637  Gross per 24 hour   Intake 534 ml   Output 0 ml   Net 534 ml     MRI BRAIN WO CONTRAST    Result Date: 10/11/2024  EXAMINATION: MRI OF THE BRAIN WITHOUT CONTRAST  10/11/2024 3:39 pm TECHNIQUE: Multiplanar multisequence MRI of the brain was performed without the administration of intravenous contrast. COMPARISON: None. HISTORY: ORDERING SYSTEM PROVIDED HISTORY: Concern for CVA, AMS GCS 14, expressive aphasia, fall with small L IPH TECHNOLOGIST PROVIDED HISTORY: Reason for exam:->Concern for CVA, AMS GCS 14, expressive aphasia, fall with small L IPH What is the sedation requirement?->None What reading provider will be dictating this exam?->CRC FINDINGS: INTRACRANIAL STRUCTURES/VENTRICLES: There is an acute or early subacute infarction extending from the left corona radiata into the superior basal ganglia and the periventricular white matter.  The infarction measures approximately 2.2 x 1.5 cm in the maximal axial plane. There is a small, 5 mm focus of acute or subacute hemorrhage in the left subinsular region.  No mass effect or midline shift.  Moderate generalized cerebral volume loss.  Severe chronic microvascular ischemic changes. No hydrocephalus or extra-axial fluid. ORBITS: Prosthetic lenses are seen in the globes bilaterally.  The orbits are otherwise grossly unremarkable. SINUSES: The visualized paranasal sinuses and mastoid air cells demonstrate no acute abnormality. BONES/SOFT TISSUES: The bone marrow signal intensity appears normal. The soft tissues demonstrate no acute abnormality.     1. Acute or early subacute INFARCTION extending from the left corona radiata into the superior basal ganglia and the  06:36 AM    K 3.2 10/19/2024 06:36 AM     10/19/2024 06:36 AM    CO2 23 10/19/2024 06:36 AM    BUN 18 10/19/2024 06:36 AM    CREATININE 1.1 10/19/2024 06:36 AM    CALCIUM 8.8 10/19/2024 06:36 AM    LABGLOM 65 10/19/2024 06:36 AM    GLUCOSE 149 10/19/2024 06:36 AM      potassium bicarb-citric acid  40 mEq Oral Once    ampicillin-sulbactam  3,000 mg IntraVENous Q6H    ipratropium 0.5 mg-albuterol 2.5 mg  1 Dose Inhalation Q4H RT    levETIRAcetam  250 mg IntraVENous Q12H    amLODIPine  5 mg Oral Daily    [Held by provider] aspirin  81 mg Oral Daily    atorvastatin  80 mg Oral Nightly    finasteride  5 mg Oral Daily    levothyroxine  75 mcg Oral Daily    vitamin D  2,000 Units Oral Daily     Opened eyes to voice follows simple commands remains demented not agitated  Assessment:  Patient Active Problem List   Diagnosis    Intracranial hemorrhage (HCC)    Head injury    Acute stroke due to ischemia (HCC)    Severe protein-calorie malnutrition (HCC)    Palliative care encounter     Plan:Continue current care  Adarsh Cabral MD M.D.

## 2024-10-19 NOTE — PROGRESS NOTES
GENERAL SURGERY  DAILY PROGRESS NOTE    Patient's Name/Date of Birth: Juan J Choudhury / 1940    Date: 2024     Chief Complaint   Patient presents with    Cerebrovascular Accident     (Patient fell backwards, hit head lac back of head, LKW 19:55, +thinners)        Subjective:  No acute events overnight.  Denies abdominal pain, nausea or emesis.  Patient is doing well this morning.    Objective:  Last 24Hrs  Temp  Av.6 °F (36.4 °C)  Min: 97.4 °F (36.3 °C)  Max: 97.7 °F (36.5 °C)  Resp  Av.8  Min: 16  Max: 26  Pulse  Av.4  Min: 61  Max: 79  Systolic (24hrs), Av , Min:77 , Max:159     Diastolic (24hrs), Av, Min:41, Max:87    SpO2  Av.4 %  Min: 91 %  Max: 99 %    I/O last 3 completed shifts:  In: 3534 [I.V.:3050; NG/GT:384; IV Piggyback:100]  Out: 0       General: In no acute distress, alert and oriented x3  Cardiovascular: Warm throughout, no edema  Respiratory: On 4 L nasal cannula.  Abdomen: soft, nontender, nondistended, PEG tube in place with tube feeds running.  Skin: no obvious rashes or lesions appreciated    CBC  Recent Labs     10/17/24  0543 10/18/24  0609 10/19/24  0636   WBC 7.6 6.1 6.4   RBC 4.94 4.33 3.94   HGB 14.5 12.7 11.6*   HCT 42.7 39.2 35.2*   MCV 86.4 90.5 89.3   MCH 29.4 29.3 29.4   MCHC 34.0 32.4 33.0   RDW 12.7 12.9 13.0    217 265   MPV 9.3 9.4 9.5       CMP  Recent Labs     10/17/24  0543 10/18/24  0609 10/19/24  0636    141 146   K 3.7 3.7 3.2*    112* 114*   CO2 25 21* 23   BUN 27* 22 18   CREATININE 1.2 1.2 1.1   GLUCOSE 170* 144* 149*   CALCIUM 8.9 8.6 8.8   BILITOT 0.6 0.6 0.6   ALKPHOS 71 73 68   AST 12 15 35   ALT 7 8 22         Assessment/Plan:    Patient Active Problem List   Diagnosis    Intracranial hemorrhage (HCC)    Head injury    Acute stroke due to ischemia (HCC)    Severe protein-calorie malnutrition (HCC)    Palliative care encounter       83 y.o. male with left basal ganglia intraparenchymal hemorrhage and a

## 2024-10-19 NOTE — PROGRESS NOTES
[facility]  Neuro Inpatient Follow Up        Juan J Choudhury is a 83 y.o.  male     Neuro is following for AMS and fall    PMH: Prior left JOSELYN stroke, vascular dementia, PVD, COPD, CKD, history of prostatic carcinoma, hypothyroidism, active smoker, lung cancer s/p resection at [facility], carotid stenosis, PVD, hypothyroidism    HPI:  Patient presented to ED on 10/10 after having a fall about 30 minutes prior to arrival.  After the fall patient was confused.  Patient goes out to smoke on his porch every night and on the day of arrival patient was doing so when he had a fall.  Per ED documentation, patient's daughter reported being off balance and seeming to decline over the past few days including staring off into space.    MRI showed acute left basal ganglia stroke with hemorrhage CTA head and neck showed moderate stenosis in the right M1, left A2 and 60 to 70% stenosis in the proximal right ICA.  Patient also had high-grade stenosis in the left subclavian.      He was started on Keppra.  Plavix is on his home med list, but uncertain if this was effective.    He was having some persistent lethargy and intermittent fevers, but subsequent CTs showed stable bleed.    10/15: Keppra decreased to 250 mg twice daily  10/16: EEG--no seizures  10/18: Underwent PEG.  Found to have pneumonia. R/P CTH stable    Subjective:  He is lying in bed and alert, but somewhat slow to respond.  He follows all commands and has weakness on his right side.  He is still somewhat confused.  His wife is at the bedside and states that he is more alert and interactive today.  He will likely go to rehab    No chest pain or palpitations  No SOB  No vertigo, lightheadedness or loss of consciousness  No falls, tripping or stumbling  No incontinence of bowels or bladder  No itching or bruising appreciated    ROS otherwise negative     Objective:     BP (!) 144/56   Pulse 81   Temp 98.5 °F (36.9 °C) (Temporal)   Resp 18

## 2024-10-20 LAB
ALBUMIN SERPL-MCNC: 2.5 G/DL (ref 3.5–5.2)
ALP SERPL-CCNC: 93 U/L (ref 40–129)
ALT SERPL-CCNC: 40 U/L (ref 0–40)
ANION GAP SERPL CALCULATED.3IONS-SCNC: 10 MMOL/L (ref 7–16)
AST SERPL-CCNC: 44 U/L (ref 0–39)
BASOPHILS # BLD: 0.06 K/UL (ref 0–0.2)
BASOPHILS NFR BLD: 1 % (ref 0–2)
BILIRUB SERPL-MCNC: 0.5 MG/DL (ref 0–1.2)
BUN SERPL-MCNC: 12 MG/DL (ref 6–23)
CALCIUM SERPL-MCNC: 8.3 MG/DL (ref 8.6–10.2)
CHLORIDE SERPL-SCNC: 111 MMOL/L (ref 98–107)
CO2 SERPL-SCNC: 23 MMOL/L (ref 22–29)
CREAT SERPL-MCNC: 1.1 MG/DL (ref 0.7–1.2)
EOSINOPHIL # BLD: 0.12 K/UL (ref 0.05–0.5)
EOSINOPHILS RELATIVE PERCENT: 2 % (ref 0–6)
ERYTHROCYTE [DISTWIDTH] IN BLOOD BY AUTOMATED COUNT: 13 % (ref 11.5–15)
GFR, ESTIMATED: 71 ML/MIN/1.73M2
GLUCOSE SERPL-MCNC: 164 MG/DL (ref 74–99)
HCT VFR BLD AUTO: 35.5 % (ref 37–54)
HGB BLD-MCNC: 11.4 G/DL (ref 12.5–16.5)
LYMPHOCYTES NFR BLD: 0.3 K/UL (ref 1.5–4)
LYMPHOCYTES RELATIVE PERCENT: 4 % (ref 20–42)
MCH RBC QN AUTO: 28.9 PG (ref 26–35)
MCHC RBC AUTO-ENTMCNC: 32.1 G/DL (ref 32–34.5)
MCV RBC AUTO: 89.9 FL (ref 80–99.9)
MONOCYTES NFR BLD: 0.36 K/UL (ref 0.1–0.95)
MONOCYTES NFR BLD: 5 % (ref 2–12)
NEUTROPHILS NFR BLD: 88 % (ref 43–80)
NEUTS SEG NFR BLD: 5.96 K/UL (ref 1.8–7.3)
PLATELET # BLD AUTO: 302 K/UL (ref 130–450)
PMV BLD AUTO: 9.3 FL (ref 7–12)
POTASSIUM SERPL-SCNC: 3.2 MMOL/L (ref 3.5–5)
PROT SERPL-MCNC: 4.9 G/DL (ref 6.4–8.3)
RBC # BLD AUTO: 3.95 M/UL (ref 3.8–5.8)
RBC # BLD: ABNORMAL 10*6/UL
SODIUM SERPL-SCNC: 144 MMOL/L (ref 132–146)
WBC OTHER # BLD: 6.8 K/UL (ref 4.5–11.5)

## 2024-10-20 PROCEDURE — 6370000000 HC RX 637 (ALT 250 FOR IP): Performed by: PSYCHIATRY & NEUROLOGY

## 2024-10-20 PROCEDURE — 2060000000 HC ICU INTERMEDIATE R&B

## 2024-10-20 PROCEDURE — 85025 COMPLETE CBC W/AUTO DIFF WBC: CPT

## 2024-10-20 PROCEDURE — 36415 COLL VENOUS BLD VENIPUNCTURE: CPT

## 2024-10-20 PROCEDURE — 97530 THERAPEUTIC ACTIVITIES: CPT

## 2024-10-20 PROCEDURE — 80053 COMPREHEN METABOLIC PANEL: CPT

## 2024-10-20 PROCEDURE — 6360000002 HC RX W HCPCS: Performed by: INTERNAL MEDICINE

## 2024-10-20 PROCEDURE — 2700000000 HC OXYGEN THERAPY PER DAY

## 2024-10-20 PROCEDURE — 97112 NEUROMUSCULAR REEDUCATION: CPT

## 2024-10-20 PROCEDURE — 94640 AIRWAY INHALATION TREATMENT: CPT

## 2024-10-20 PROCEDURE — 6370000000 HC RX 637 (ALT 250 FOR IP): Performed by: INTERNAL MEDICINE

## 2024-10-20 PROCEDURE — 2580000003 HC RX 258: Performed by: INTERNAL MEDICINE

## 2024-10-20 PROCEDURE — 6360000002 HC RX W HCPCS: Performed by: NURSE PRACTITIONER

## 2024-10-20 RX ADMIN — Medication 2000 UNITS: at 08:29

## 2024-10-20 RX ADMIN — AMLODIPINE BESYLATE 5 MG: 5 TABLET ORAL at 08:29

## 2024-10-20 RX ADMIN — IPRATROPIUM BROMIDE AND ALBUTEROL SULFATE 1 DOSE: 2.5; .5 SOLUTION RESPIRATORY (INHALATION) at 16:26

## 2024-10-20 RX ADMIN — AMPICILLIN SODIUM AND SULBACTAM SODIUM 3000 MG: 2; 1 INJECTION, POWDER, FOR SOLUTION INTRAMUSCULAR; INTRAVENOUS at 18:18

## 2024-10-20 RX ADMIN — ATORVASTATIN CALCIUM 80 MG: 80 TABLET, FILM COATED ORAL at 20:28

## 2024-10-20 RX ADMIN — LEVETIRACETAM 250 MG: 100 INJECTION INTRAVENOUS at 08:29

## 2024-10-20 RX ADMIN — IPRATROPIUM BROMIDE AND ALBUTEROL SULFATE 1 DOSE: 2.5; .5 SOLUTION RESPIRATORY (INHALATION) at 20:24

## 2024-10-20 RX ADMIN — FINASTERIDE 5 MG: 5 TABLET, FILM COATED ORAL at 20:28

## 2024-10-20 RX ADMIN — POTASSIUM BICARBONATE 20 MEQ: 782 TABLET, EFFERVESCENT ORAL at 12:20

## 2024-10-20 RX ADMIN — AMPICILLIN SODIUM AND SULBACTAM SODIUM 3000 MG: 2; 1 INJECTION, POWDER, FOR SOLUTION INTRAMUSCULAR; INTRAVENOUS at 06:41

## 2024-10-20 RX ADMIN — LEVETIRACETAM 250 MG: 100 INJECTION INTRAVENOUS at 20:28

## 2024-10-20 RX ADMIN — AMPICILLIN SODIUM AND SULBACTAM SODIUM 3000 MG: 2; 1 INJECTION, POWDER, FOR SOLUTION INTRAMUSCULAR; INTRAVENOUS at 13:10

## 2024-10-20 RX ADMIN — DEXTROSE AND SODIUM CHLORIDE: 5; 900 INJECTION, SOLUTION INTRAVENOUS at 06:45

## 2024-10-20 RX ADMIN — LEVOTHYROXINE SODIUM 75 MCG: 0.07 TABLET ORAL at 06:42

## 2024-10-20 RX ADMIN — POTASSIUM BICARBONATE 20 MEQ: 782 TABLET, EFFERVESCENT ORAL at 20:28

## 2024-10-20 RX ADMIN — AMPICILLIN SODIUM AND SULBACTAM SODIUM 3000 MG: 2; 1 INJECTION, POWDER, FOR SOLUTION INTRAMUSCULAR; INTRAVENOUS at 00:30

## 2024-10-20 RX ADMIN — IPRATROPIUM BROMIDE AND ALBUTEROL SULFATE 1 DOSE: 2.5; .5 SOLUTION RESPIRATORY (INHALATION) at 11:35

## 2024-10-20 RX ADMIN — IPRATROPIUM BROMIDE AND ALBUTEROL SULFATE 1 DOSE: 2.5; .5 SOLUTION RESPIRATORY (INHALATION) at 01:38

## 2024-10-20 RX ADMIN — IPRATROPIUM BROMIDE AND ALBUTEROL SULFATE 1 DOSE: 2.5; .5 SOLUTION RESPIRATORY (INHALATION) at 08:51

## 2024-10-20 ASSESSMENT — PAIN SCALES - GENERAL
PAINLEVEL_OUTOF10: 0

## 2024-10-20 NOTE — PROGRESS NOTES
Score: 6/24       Initial Eval Status  Date: 10/15/2024   Treatment Status  Date:  10/20/24 STG=LTG  Time frame: 10-14 days   Feeding Total/Dependent  Simulated   NPO Min A    Grooming Total/Dependent  To wash face - no initiation despite max cues and Ione A   Dep Min A    UB Dressing Total/Dependent  Gown   Dep Min A    LB Dressing Total/Dependent  Socks   Dep Mod A    Bathing Total/Dependent  Simulated   Dep Mod A    Toileting Total/Dependent    Max A   Pt incontinent with urine required LB bathing for pericare areas for skin protection/linen/pad change   Mod A    Bed Mobility  Rolling L/R: Total/Dependent to the R   Max assist to the L   Supine to sit: Max x2   Sit to supine: Max x2  Bed mobility tasks with max A for rolling side to side multiple times/tactile cues for UE placement and tech/use of side rails./^ time to complete. Supine to sit: Min A   Sit to supine: Min A    Functional Transfers Sit to stand: NT   Stand to sit: NT   Stand pivot: NT   Pt too lethargic to complete this date   NT   lethargic  Min A    Functional Mobility NT 2/2 safety concerns   NT Min A   with AAD    Balance Sitting: Max A      Standing: NT   Sitting: NT   Standing: NT  Pt letahrgic  Sitting: Stand by assist       Standing: Min A    Activity Tolerance Poor  fatigue   Poor  Lethargic    Fair +   Visual/  Perceptual no glasses present            WFL        BUE  ROM/Strength/  Fine motor Coordination Hand dominance: right      RUE: ROM impaired     Strength: grossly 0/5      Strength: impaired     Coordination:  impaired     LUE: ROM impaired     Strength: grossly 2+/5      Strength: impaired     Coordination: impaired   Pt will participate in BUE exercise with supervision to increase strength, ROM, and coordination for increased independence in functional mobility and ADLs    Safety   Poor  Poor Fair during functional activity      Education:  Pt education on proper UE placement and tech during all tasks.    Comments: Per RN

## 2024-10-20 NOTE — PROGRESS NOTES
Somnolent but will respond to questions  Afebrile , vs stable  RRR  Lungs more clear  Continue parenteral ATB  To begin enteric alimentation   To [facility] soon

## 2024-10-20 NOTE — PROGRESS NOTES
adjacent vein. There is diminished contrast enhancement in the distal left subclavian artery. CAROTID ARTERIES: Prominent atherosclerosis in the proximal right internal carotid artery results in approximately 60-70% stenosis.  No significant stenosis in the left carotid arteries. VERTEBRAL ARTERIES: Moderate to severe stenosis of the origin of the right vertebral artery.  The remainder of the right vertebral artery is widely patent.  Diminished flow related enhancement in the left vertebral artery likely due to the high-grade stenosis in the left subclavian artery.  The origin of the right vertebral artery is obscured by beam hardening artifact. SOFT TISSUES: The lung apices are clear.  1.8 x 1.2 cm hypodense lesion in the right parotid gland.  No cervical or superior mediastinal lymphadenopathy.  The larynx and pharynx are unremarkable.  No acute abnormality of the salivary and thyroid glands. BONES: No acute osseous abnormality. CTA HEAD: ANTERIOR CIRCULATION: Moderate stenosis grade stenosis in the distal M1 segment of the right middle cerebral artery.  No significant stenosis in the left middle cerebral arteries.  Moderate stenoses are noted in the A2 segment of the left anterior cerebral artery.  No significant stenosis noted in the right anterior cerebral artery.  No significant stenosis noted in the internal carotid arteries. POSTERIOR CIRCULATION: No significant stenosis of the vertebral, basilar, or posterior cerebral arteries. No aneurysm. OTHER: No dural venous sinus thrombosis on this non-dedicated study. BRAIN: No mass effect or midline shift. No extra-axial fluid collection. The gray-white differentiation is maintained.     CTA NECK: 1. 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 2. Diminished flow related enhancement in the left vertebral artery and the distal left subclavian artery.  The site of the severe stenosis in the left subclavian artery is obscured by beam hardening  05:04 AM    K 3.2 10/20/2024 05:04 AM     10/20/2024 05:04 AM    CO2 23 10/20/2024 05:04 AM    BUN 12 10/20/2024 05:04 AM    CREATININE 1.1 10/20/2024 05:04 AM    CALCIUM 8.3 10/20/2024 05:04 AM    LABGLOM 71 10/20/2024 05:04 AM    GLUCOSE 164 10/20/2024 05:04 AM      potassium bicarb-citric acid  20 mEq Oral BID    ampicillin-sulbactam  3,000 mg IntraVENous Q6H    ipratropium 0.5 mg-albuterol 2.5 mg  1 Dose Inhalation Q4H RT    levETIRAcetam  250 mg IntraVENous Q12H    amLODIPine  5 mg Oral Daily    [Held by provider] aspirin  81 mg Oral Daily    atorvastatin  80 mg Oral Nightly    finasteride  5 mg Oral Daily    levothyroxine  75 mcg Oral Daily    vitamin D  2,000 Units Oral Daily     Opens eyes to voice pleasantly confused follows simple commands  Assessment:  Patient Active Problem List   Diagnosis    Intracranial hemorrhage (HCC)    Head injury    Acute stroke due to ischemia (HCC)    Severe protein-calorie malnutrition (HCC)    Palliative care encounter     Plan: Full dose anticoagulation remains contraindicated till follow-up head CT shows complete resolution of the left insular hemorrhage ontinue current care  Adarsh Cabral MD M.D.

## 2024-10-20 NOTE — PROGRESS NOTES
Strength BUE:  Refer to OT  BLE:  Not formally assessed due to difficulty with command follow     Balance Sitting EOB:  Ishaan  Dynamic Standing:  ModA with FWW Sitting EOB:  NT  Dynamic Standing:  NT Sitting EOB:  Independent  Dynamic Standing:  SBA with AAD     Pt is A & O x 1-2 grossly. Pt lethargic during session requiring cues for eyes open and participation.   Sensation:  NT  Edema:  none noted      Patient education  Pt educated on role of PT, safety during mobility    Patient response to education:   Pt verbalized understanding Pt demonstrated skill Pt requires further education in this area   yes partial yes     ASSESSMENT:    Comments:  pt semi-supine in bed upon entry and agreeable to PT treatment. Pt completed rolling in bed for new pad placement d/t incontinence. Pt able to reach for rail in sidelying. Further mobility deferred d/t weakness and lethargy. Pt positioned with bed controls and TAPS.    All needs met and call light in reach. All lines remained intact.     Treatment:  Patient practiced and was instructed in the following treatment:    Bed Mobility: VCs provided for sequencing and safety during mobility. Manual assist provided for completion of task.  Skilled Positioning: Positioning for improved posture, joint and skin integrity     PLAN:    Patient is making fair progress towards established goals.  Will continue with current POC.      Time in  0935  Time out  0945    Total Treatment Time  10 minutes     CPT codes:  [] Gait training 76788 - minutes  [] Manual therapy 60808 - minutes  [x] Therapeutic activities 35713 10 minutes  [] Therapeutic exercises 64313 - minutes  [] Neuromuscular reeducation 88201 - minutes    Carrie Menjivar PT, DPT  [id number]

## 2024-10-21 ENCOUNTER — APPOINTMENT (OUTPATIENT)
Dept: CT IMAGING | Age: 84
DRG: 040 | End: 2024-10-21
Attending: INTERNAL MEDICINE
Payer: MEDICARE

## 2024-10-21 ENCOUNTER — APPOINTMENT (OUTPATIENT)
Dept: GENERAL RADIOLOGY | Age: 84
DRG: 040 | End: 2024-10-21
Payer: MEDICARE

## 2024-10-21 LAB
ALBUMIN SERPL-MCNC: 2.4 G/DL (ref 3.5–5.2)
ALP SERPL-CCNC: 101 U/L (ref 40–129)
ALT SERPL-CCNC: 55 U/L (ref 0–40)
ANION GAP SERPL CALCULATED.3IONS-SCNC: 9 MMOL/L (ref 7–16)
AST SERPL-CCNC: 49 U/L (ref 0–39)
BASOPHILS # BLD: 0.03 K/UL (ref 0–0.2)
BASOPHILS NFR BLD: 0 % (ref 0–2)
BILIRUB SERPL-MCNC: 0.4 MG/DL (ref 0–1.2)
BUN SERPL-MCNC: 13 MG/DL (ref 6–23)
CALCIUM SERPL-MCNC: 8.2 MG/DL (ref 8.6–10.2)
CHLORIDE SERPL-SCNC: 109 MMOL/L (ref 98–107)
CO2 SERPL-SCNC: 23 MMOL/L (ref 22–29)
CREAT SERPL-MCNC: 1.1 MG/DL (ref 0.7–1.2)
EOSINOPHIL # BLD: 0.18 K/UL (ref 0.05–0.5)
EOSINOPHILS RELATIVE PERCENT: 2 % (ref 0–6)
ERYTHROCYTE [DISTWIDTH] IN BLOOD BY AUTOMATED COUNT: 13 % (ref 11.5–15)
GFR, ESTIMATED: 66 ML/MIN/1.73M2
GLUCOSE SERPL-MCNC: 165 MG/DL (ref 74–99)
HCT VFR BLD AUTO: 35.3 % (ref 37–54)
HGB BLD-MCNC: 11.7 G/DL (ref 12.5–16.5)
IMM GRANULOCYTES # BLD AUTO: 0.09 K/UL (ref 0–0.58)
IMM GRANULOCYTES NFR BLD: 1 % (ref 0–5)
LYMPHOCYTES NFR BLD: 0.41 K/UL (ref 1.5–4)
LYMPHOCYTES RELATIVE PERCENT: 4 % (ref 20–42)
MCH RBC QN AUTO: 29.8 PG (ref 26–35)
MCHC RBC AUTO-ENTMCNC: 33.1 G/DL (ref 32–34.5)
MCV RBC AUTO: 89.8 FL (ref 80–99.9)
MONOCYTES NFR BLD: 0.66 K/UL (ref 0.1–0.95)
MONOCYTES NFR BLD: 7 % (ref 2–12)
NEUTROPHILS NFR BLD: 86 % (ref 43–80)
NEUTS SEG NFR BLD: 8.67 K/UL (ref 1.8–7.3)
PLATELET # BLD AUTO: 344 K/UL (ref 130–450)
PMV BLD AUTO: 9.2 FL (ref 7–12)
POTASSIUM SERPL-SCNC: 3.3 MMOL/L (ref 3.5–5)
PROT SERPL-MCNC: 4.9 G/DL (ref 6.4–8.3)
RBC # BLD AUTO: 3.93 M/UL (ref 3.8–5.8)
RBC # BLD: ABNORMAL 10*6/UL
RBC # BLD: ABNORMAL 10*6/UL
SODIUM SERPL-SCNC: 141 MMOL/L (ref 132–146)
WBC OTHER # BLD: 10 K/UL (ref 4.5–11.5)

## 2024-10-21 PROCEDURE — 6360000002 HC RX W HCPCS: Performed by: INTERNAL MEDICINE

## 2024-10-21 PROCEDURE — 6370000000 HC RX 637 (ALT 250 FOR IP): Performed by: INTERNAL MEDICINE

## 2024-10-21 PROCEDURE — 92526 ORAL FUNCTION THERAPY: CPT

## 2024-10-21 PROCEDURE — 2700000000 HC OXYGEN THERAPY PER DAY

## 2024-10-21 PROCEDURE — 6360000002 HC RX W HCPCS: Performed by: NURSE PRACTITIONER

## 2024-10-21 PROCEDURE — 94640 AIRWAY INHALATION TREATMENT: CPT

## 2024-10-21 PROCEDURE — 2580000003 HC RX 258: Performed by: INTERNAL MEDICINE

## 2024-10-21 PROCEDURE — 6360000004 HC RX CONTRAST MEDICATION: Performed by: RADIOLOGY

## 2024-10-21 PROCEDURE — 6370000000 HC RX 637 (ALT 250 FOR IP): Performed by: PSYCHIATRY & NEUROLOGY

## 2024-10-21 PROCEDURE — 85025 COMPLETE CBC W/AUTO DIFF WBC: CPT

## 2024-10-21 PROCEDURE — 5A0935A ASSISTANCE WITH RESPIRATORY VENTILATION, LESS THAN 24 CONSECUTIVE HOURS, HIGH NASAL FLOW/VELOCITY: ICD-10-PCS | Performed by: INTERNAL MEDICINE

## 2024-10-21 PROCEDURE — 2060000000 HC ICU INTERMEDIATE R&B

## 2024-10-21 PROCEDURE — 71045 X-RAY EXAM CHEST 1 VIEW: CPT

## 2024-10-21 PROCEDURE — 80053 COMPREHEN METABOLIC PANEL: CPT

## 2024-10-21 PROCEDURE — 36415 COLL VENOUS BLD VENIPUNCTURE: CPT

## 2024-10-21 PROCEDURE — 71275 CT ANGIOGRAPHY CHEST: CPT

## 2024-10-21 RX ORDER — ALBUTEROL SULFATE 0.63 MG/3ML
0.63 SOLUTION RESPIRATORY (INHALATION) EVERY 6 HOURS PRN
Status: DISCONTINUED | OUTPATIENT
Start: 2024-10-21 | End: 2024-10-24 | Stop reason: HOSPADM

## 2024-10-21 RX ORDER — IOPAMIDOL 755 MG/ML
75 INJECTION, SOLUTION INTRAVASCULAR
Status: COMPLETED | OUTPATIENT
Start: 2024-10-21 | End: 2024-10-21

## 2024-10-21 RX ADMIN — LEVOTHYROXINE SODIUM 75 MCG: 0.07 TABLET ORAL at 06:34

## 2024-10-21 RX ADMIN — AMPICILLIN SODIUM AND SULBACTAM SODIUM 3000 MG: 2; 1 INJECTION, POWDER, FOR SOLUTION INTRAMUSCULAR; INTRAVENOUS at 06:33

## 2024-10-21 RX ADMIN — ATORVASTATIN CALCIUM 80 MG: 80 TABLET, FILM COATED ORAL at 20:30

## 2024-10-21 RX ADMIN — IPRATROPIUM BROMIDE AND ALBUTEROL SULFATE 1 DOSE: 2.5; .5 SOLUTION RESPIRATORY (INHALATION) at 00:30

## 2024-10-21 RX ADMIN — FINASTERIDE 5 MG: 5 TABLET, FILM COATED ORAL at 20:30

## 2024-10-21 RX ADMIN — AMPICILLIN SODIUM AND SULBACTAM SODIUM 3000 MG: 2; 1 INJECTION, POWDER, FOR SOLUTION INTRAMUSCULAR; INTRAVENOUS at 13:57

## 2024-10-21 RX ADMIN — ACETAMINOPHEN 650 MG: 650 SUPPOSITORY RECTAL at 23:36

## 2024-10-21 RX ADMIN — LEVETIRACETAM 250 MG: 100 INJECTION INTRAVENOUS at 09:15

## 2024-10-21 RX ADMIN — IPRATROPIUM BROMIDE AND ALBUTEROL SULFATE 1 DOSE: 2.5; .5 SOLUTION RESPIRATORY (INHALATION) at 16:22

## 2024-10-21 RX ADMIN — IOPAMIDOL 75 ML: 755 INJECTION, SOLUTION INTRAVENOUS at 10:39

## 2024-10-21 RX ADMIN — AMPICILLIN SODIUM AND SULBACTAM SODIUM 3000 MG: 2; 1 INJECTION, POWDER, FOR SOLUTION INTRAMUSCULAR; INTRAVENOUS at 17:44

## 2024-10-21 RX ADMIN — IPRATROPIUM BROMIDE AND ALBUTEROL SULFATE 1 DOSE: 2.5; .5 SOLUTION RESPIRATORY (INHALATION) at 08:24

## 2024-10-21 RX ADMIN — ACETAMINOPHEN 650 MG: 650 SUPPOSITORY RECTAL at 16:17

## 2024-10-21 RX ADMIN — POTASSIUM BICARBONATE 20 MEQ: 782 TABLET, EFFERVESCENT ORAL at 09:15

## 2024-10-21 RX ADMIN — AMLODIPINE BESYLATE 5 MG: 5 TABLET ORAL at 09:15

## 2024-10-21 RX ADMIN — Medication 2000 UNITS: at 09:16

## 2024-10-21 RX ADMIN — AMPICILLIN SODIUM AND SULBACTAM SODIUM 3000 MG: 2; 1 INJECTION, POWDER, FOR SOLUTION INTRAMUSCULAR; INTRAVENOUS at 00:24

## 2024-10-21 RX ADMIN — LEVETIRACETAM 250 MG: 100 INJECTION INTRAVENOUS at 20:39

## 2024-10-21 RX ADMIN — IPRATROPIUM BROMIDE AND ALBUTEROL SULFATE 1 DOSE: 2.5; .5 SOLUTION RESPIRATORY (INHALATION) at 12:57

## 2024-10-21 RX ADMIN — POTASSIUM BICARBONATE 20 MEQ: 782 TABLET, EFFERVESCENT ORAL at 20:30

## 2024-10-21 RX ADMIN — IPRATROPIUM BROMIDE AND ALBUTEROL SULFATE 1 DOSE: 2.5; .5 SOLUTION RESPIRATORY (INHALATION) at 20:21

## 2024-10-21 RX ADMIN — IPRATROPIUM BROMIDE AND ALBUTEROL SULFATE 1 DOSE: 2.5; .5 SOLUTION RESPIRATORY (INHALATION) at 04:15

## 2024-10-21 NOTE — PROGRESS NOTES
Neurosurg progress note  VITALS:  /64   Pulse 89   Temp 98.8 °F (37.1 °C) (Axillary)   Resp 22   Ht 1.702 m (5' 7.01\")   Wt 61 kg (134 lb 7.7 oz)   SpO2 97%   BMI 21.06 kg/m²   24HR INTAKE/OUTPUT:    Intake/Output Summary (Last 24 hours) at 10/21/2024 1040  Last data filed at 10/21/2024 0635  Gross per 24 hour   Intake 5415.16 ml   Output 900 ml   Net 4515.16 ml     MRI BRAIN WO CONTRAST    Result Date: 10/11/2024  EXAMINATION: MRI OF THE BRAIN WITHOUT CONTRAST  10/11/2024 3:39 pm TECHNIQUE: Multiplanar multisequence MRI of the brain was performed without the administration of intravenous contrast. COMPARISON: None. HISTORY: ORDERING SYSTEM PROVIDED HISTORY: Concern for CVA, AMS GCS 14, expressive aphasia, fall with small L IPH TECHNOLOGIST PROVIDED HISTORY: Reason for exam:->Concern for CVA, AMS GCS 14, expressive aphasia, fall with small L IPH What is the sedation requirement?->None What reading provider will be dictating this exam?->CRC FINDINGS: INTRACRANIAL STRUCTURES/VENTRICLES: There is an acute or early subacute infarction extending from the left corona radiata into the superior basal ganglia and the periventricular white matter.  The infarction measures approximately 2.2 x 1.5 cm in the maximal axial plane. There is a small, 5 mm focus of acute or subacute hemorrhage in the left subinsular region.  No mass effect or midline shift.  Moderate generalized cerebral volume loss.  Severe chronic microvascular ischemic changes. No hydrocephalus or extra-axial fluid. ORBITS: Prosthetic lenses are seen in the globes bilaterally.  The orbits are otherwise grossly unremarkable. SINUSES: The visualized paranasal sinuses and mastoid air cells demonstrate no acute abnormality. BONES/SOFT TISSUES: The bone marrow signal intensity appears normal. The soft tissues demonstrate no acute abnormality.     1. Acute or early subacute INFARCTION extending from the left corona radiata into the superior basal ganglia  parietal scalp soft tissue injury.     1. Tiny hyperdense area in the left basal ganglia measuring 0.5 cm. In the setting of trauma, could be a small contusion. No significant mass effect or midline shift. 2. Right parietal scalp soft tissue injury. 3. Age related changes of brain volume loss and nonspecific white matter hypodensity most often ascribed to chronic small vessel ischemia.     CT CERVICAL SPINE WO CONTRAST    Result Date: 10/10/2024  EXAMINATION: CT OF THE CERVICAL SPINE WITHOUT CONTRAST 10/10/2024 9:01 pm TECHNIQUE: CT of the cervical spine was performed without the administration of intravenous contrast. Multiplanar reformatted images are provided for review. Automated exposure control, iterative reconstruction, and/or weight based adjustment of the mA/kV was utilized to reduce the radiation dose to as low as reasonably achievable. COMPARISON: None. HISTORY: ORDERING SYSTEM PROVIDED HISTORY: fall; concern for fx vs subluxation TECHNOLOGIST PROVIDED HISTORY: Reason for exam:->fall; concern for fx vs subluxation Decision Support Exception - unselect if not a suspected or confirmed emergency medical condition->Emergency Medical Condition (MA) What reading provider will be dictating this exam?->CRC FINDINGS: BONES/ALIGNMENT: There is no acute fracture or traumatic malalignment. DEGENERATIVE CHANGES: Multilevel degenerative spondylosis greatest at C5-6 and C6-7. SOFT TISSUES: There is no prevertebral soft tissue swelling.     No acute abnormality of the cervical spine.     CBC:   Lab Results   Component Value Date/Time    WBC 10.0 10/21/2024 06:19 AM    RBC 3.93 10/21/2024 06:19 AM    HGB 11.7 10/21/2024 06:19 AM    HCT 35.3 10/21/2024 06:19 AM    MCV 89.8 10/21/2024 06:19 AM    MCH 29.8 10/21/2024 06:19 AM    MCHC 33.1 10/21/2024 06:19 AM    RDW 13.0 10/21/2024 06:19 AM     10/21/2024 06:19 AM    MPV 9.2 10/21/2024 06:19 AM     BMP:    Lab Results   Component Value Date/Time     10/21/2024

## 2024-10-21 NOTE — PROGRESS NOTES
Became acutely hypoxic early this AM  Better at this time   Afebrile , vs stable  RRR  Diminished breath sounds   CTA results pending  Will consult Pulmonary as well

## 2024-10-21 NOTE — PROGRESS NOTES
SPEECH LANGUAGE PATHOLOGY  DAILY PROGRESS NOTE        PATIENT NAME:  Juan J Choudhury      ROOM:  8506/[id number] :  1940         TODAY'S DATE:  10/21/2024       Patient seen for dysphagia      Current Diet Order: ADULT TUBE FEEDING; PEG; Standard with Fiber; Continuous; 25; Yes; 15; Q 4 hours; 45; 145; Q 6 hours; Protein; 1 Dose; Daily  Results and recommendations of swallowing evaluation reviewed.  BOTR strength/ ROM exercises to reduce pharyngeal residuals and improve epiglottic inversion were completed with maximal verbal prompts  Laryngeal strength/ ROM therapeutic exercises to improve airway protection for the least restrictive PO diet  were completed with maximal verbal prompts        Will continue

## 2024-10-21 NOTE — PROGRESS NOTES
Pt SpO2 89% on 4L NC. Increased to 9L [place] NC, SpO2 90%. Pt was suctioned with small amount of thick mucus.     RN called Dr Ledesma to notify of increased need of O2. Per Dr Ledesma order stat cxr and monitor, will follow up in AM

## 2024-10-21 NOTE — CONSULTS
Mihir Rodriges M.D.,[facility]  Servando Palma D.O., FRANCESCA., [facility]  Yola Carlos M.D.  Teresa Swan M.D.   Jeremy Zhao D.O.  Jamal Laird M.D.       Patient:  Juan J Choudhury 83 y.o. male MRN: [id number]           PULMONARY CONSULTATION    Reason for Consultation: respiratory distress  Referring Physician: Bob Ledesma MD    Communication with the referring physician will be sent via the electronic medical record.    Chief Complaint: Intracranial hemorrhage.     CODE STATUS:LIMITED CODE  Intubation/Re-intubation at the time of arrest No   Defibrillation/Cardioversion No   Chest Compressions No   Resuscitative Medications No       SUBJECTIVE:  HPI:  Juan J Choudhury is a 83 y.o. male with a past medical history of CKD, COPD, lung cancer status post resection, dementia that we are asked to evaluate for respiratory distress.    Juan J was admitted to the hospital on October 10 after sustaining a fall resulting in an intraparenchymal hemorrhage.  No surgery was required.  He has been unable to cooperate with a swallow evaluation and had a PEG tube placed on 10/18.  He has been requiring supplemental oxygen since the fall but prior to that was on room air.    Juan J was seen today with family present at the bedside.  He is alert and able to follow some commands.  He has secretions stuck in the back of his throat that were suctioned out with a Yankauer.  He is currently getting Unasyn for possible aspiration pneumonia.  Family tells me that the patient does have a history of left lung cancer that required what sounds like a wedge resection considered curative and he required no chemotherapy or any other cancer treatment.  [facility].  He has no history of sleep apnea and has never snored.  He is currently sleeping with his mouth open which leads me to believe that this is probably neurologically related to his recent brain bleed.      Past Medical History:  pleural effusions with compressive atelectasis  Possible aspiration pneumonia  History of lung cancer, status post lung surgery considered curative with no other cancer treatments required done at [facility], 2021  Prior nicotine dependence  Dysphagia, status post PEG insertion 10/18/2024  Intracranial hemorrhage    Plan:  Wean oxygen as tolerated maintain SpO2 greater than 92%  Continue Unasyn for aspiration coverage  DuoNebs q4h WA with Chest PT  Aspiration precautions, NPO.  Nutrition via PEG  Seizure prophylaxis-Keppra  Oral suction and NTS as needed  Oral care      Thank you for allowing me to participate in the care of Juan J Choudhury.   Please feel free to call with questions.     This plan of care was reviewed in collaboration with Dr. Swan    Electronically signed by OSBALDO Thompson CNP on 10/21/2024 at 5:51 PM      Note: This report was completed utilizing computer voice recognition software. Every effort has been made to ensure accuracy, however; inadvertent computerized transcription errors may be present    This is confirmation that I have personally performed a substantial portion of medical decision making (>50%) related to this patient encounter.  The medications & laboratory data and imagery were discussed and adjusted where necessary. Key issues of the case were discussed among consultants.  Review of CNP documentation was conducted and revisions were made as appropriate. I agree with the above documented exam, problem list and plan of care with the following additions:     We are asked to see Mr. Choudhury for hypoxia and respiratory failure  He was admitted with an intracranial hemorrhage and now has physical limitations  His chest imaging shows aspiration pneumonia and bilateral effusions  He is overall 14L positive - stop Ivf  Continue tube feeding  Remains on unasyn  Recommend bilateral thoracenteses    Teresa Swan MD

## 2024-10-21 NOTE — CARE COORDINATION
Here for left basal ganglia intraparenchymal hemorrhage and a scalp laceration after he fell secondary to his stroke. Had peg tube placed 10/18 - [facility] hospice signed off. Neurology signed off 10/19. PT 7  OT 6. Now with increased o2 requirements - 9 liters for CTA chest.  Discharge plan prior to this was [facility] and [facility] thru 10/22. [facility] ambulance form and envelope in soft chart Need to re consult hospice ?.Electronically signed by Melissa Farias RN on 10/21/2024 at 7:36 AM    Per Ruby @ [facility]  if does not discharge there today she will still have bed for him.and [facility] thru 10/22.Electronically signed by Melissa Farias RN on 10/21/2024 at 12:02 PM

## 2024-10-21 NOTE — ACP (ADVANCE CARE PLANNING)
Advance Care Planning   Healthcare Decision Maker:    Primary Decision Maker: mara pizano  Pvlphb - [phone]    Click here to complete Healthcare Decision Makers including selection of the Healthcare Decision Maker Relationship (ie \"Primary\").

## 2024-10-22 LAB
ALBUMIN SERPL-MCNC: 2.5 G/DL (ref 3.5–5.2)
ALP SERPL-CCNC: 99 U/L (ref 40–129)
ALT SERPL-CCNC: 54 U/L (ref 0–40)
ANION GAP SERPL CALCULATED.3IONS-SCNC: 12 MMOL/L (ref 7–16)
AST SERPL-CCNC: 35 U/L (ref 0–39)
BASOPHILS # BLD: 0 K/UL (ref 0–0.2)
BASOPHILS NFR BLD: 0 % (ref 0–2)
BILIRUB SERPL-MCNC: 0.5 MG/DL (ref 0–1.2)
BUN SERPL-MCNC: 17 MG/DL (ref 6–23)
CALCIUM SERPL-MCNC: 9 MG/DL (ref 8.6–10.2)
CHLORIDE SERPL-SCNC: 105 MMOL/L (ref 98–107)
CO2 SERPL-SCNC: 24 MMOL/L (ref 22–29)
CREAT SERPL-MCNC: 1.2 MG/DL (ref 0.7–1.2)
EOSINOPHIL # BLD: 0.39 K/UL (ref 0.05–0.5)
EOSINOPHILS RELATIVE PERCENT: 4 % (ref 0–6)
ERYTHROCYTE [DISTWIDTH] IN BLOOD BY AUTOMATED COUNT: 12.8 % (ref 11.5–15)
GFR, ESTIMATED: 64 ML/MIN/1.73M2
GLUCOSE SERPL-MCNC: 137 MG/DL (ref 74–99)
HCT VFR BLD AUTO: 39.3 % (ref 37–54)
HGB BLD-MCNC: 12.8 G/DL (ref 12.5–16.5)
INR PPP: 1.2
LYMPHOCYTES NFR BLD: 0 K/UL (ref 1.5–4)
LYMPHOCYTES RELATIVE PERCENT: 0 % (ref 20–42)
MCH RBC QN AUTO: 29.4 PG (ref 26–35)
MCHC RBC AUTO-ENTMCNC: 32.6 G/DL (ref 32–34.5)
MCV RBC AUTO: 90.1 FL (ref 80–99.9)
MONOCYTES NFR BLD: 0.39 K/UL (ref 0.1–0.95)
MONOCYTES NFR BLD: 4 % (ref 2–12)
NEUTROPHILS NFR BLD: 93 % (ref 43–80)
NEUTS SEG NFR BLD: 10.51 K/UL (ref 1.8–7.3)
PLATELET # BLD AUTO: 382 K/UL (ref 130–450)
PMV BLD AUTO: 9.3 FL (ref 7–12)
POTASSIUM SERPL-SCNC: 3.7 MMOL/L (ref 3.5–5)
PROT SERPL-MCNC: 5.2 G/DL (ref 6.4–8.3)
PROTHROMBIN TIME: 13.1 SEC (ref 9.3–12.4)
RBC # BLD AUTO: 4.36 M/UL (ref 3.8–5.8)
RBC # BLD: NORMAL 10*6/UL
SODIUM SERPL-SCNC: 141 MMOL/L (ref 132–146)
WBC OTHER # BLD: 11.3 K/UL (ref 4.5–11.5)

## 2024-10-22 PROCEDURE — 6370000000 HC RX 637 (ALT 250 FOR IP): Performed by: INTERNAL MEDICINE

## 2024-10-22 PROCEDURE — 6360000002 HC RX W HCPCS: Performed by: NURSE PRACTITIONER

## 2024-10-22 PROCEDURE — 36415 COLL VENOUS BLD VENIPUNCTURE: CPT

## 2024-10-22 PROCEDURE — 94640 AIRWAY INHALATION TREATMENT: CPT

## 2024-10-22 PROCEDURE — 97530 THERAPEUTIC ACTIVITIES: CPT

## 2024-10-22 PROCEDURE — 94667 MNPJ CHEST WALL 1ST: CPT

## 2024-10-22 PROCEDURE — 80053 COMPREHEN METABOLIC PANEL: CPT

## 2024-10-22 PROCEDURE — 85025 COMPLETE CBC W/AUTO DIFF WBC: CPT

## 2024-10-22 PROCEDURE — 85610 PROTHROMBIN TIME: CPT

## 2024-10-22 PROCEDURE — 2700000000 HC OXYGEN THERAPY PER DAY

## 2024-10-22 PROCEDURE — 97535 SELF CARE MNGMENT TRAINING: CPT

## 2024-10-22 PROCEDURE — 1200000000 HC SEMI PRIVATE

## 2024-10-22 PROCEDURE — 2580000003 HC RX 258: Performed by: INTERNAL MEDICINE

## 2024-10-22 PROCEDURE — 6360000002 HC RX W HCPCS: Performed by: INTERNAL MEDICINE

## 2024-10-22 PROCEDURE — 6370000000 HC RX 637 (ALT 250 FOR IP): Performed by: PSYCHIATRY & NEUROLOGY

## 2024-10-22 RX ORDER — SILVER SULFADIAZINE 10 MG/G
CREAM TOPICAL 2 TIMES DAILY
Status: DISCONTINUED | OUTPATIENT
Start: 2024-10-22 | End: 2024-10-24 | Stop reason: HOSPADM

## 2024-10-22 RX ADMIN — IPRATROPIUM BROMIDE AND ALBUTEROL SULFATE 1 DOSE: 2.5; .5 SOLUTION RESPIRATORY (INHALATION) at 16:42

## 2024-10-22 RX ADMIN — LEVOTHYROXINE SODIUM 75 MCG: 0.07 TABLET ORAL at 06:48

## 2024-10-22 RX ADMIN — FINASTERIDE 5 MG: 5 TABLET, FILM COATED ORAL at 21:53

## 2024-10-22 RX ADMIN — IPRATROPIUM BROMIDE AND ALBUTEROL SULFATE 1 DOSE: 2.5; .5 SOLUTION RESPIRATORY (INHALATION) at 08:58

## 2024-10-22 RX ADMIN — Medication 2000 UNITS: at 08:10

## 2024-10-22 RX ADMIN — ATORVASTATIN CALCIUM 80 MG: 80 TABLET, FILM COATED ORAL at 21:54

## 2024-10-22 RX ADMIN — LEVETIRACETAM 250 MG: 100 INJECTION INTRAVENOUS at 08:10

## 2024-10-22 RX ADMIN — AMPICILLIN SODIUM AND SULBACTAM SODIUM 3000 MG: 2; 1 INJECTION, POWDER, FOR SOLUTION INTRAMUSCULAR; INTRAVENOUS at 06:33

## 2024-10-22 RX ADMIN — POTASSIUM BICARBONATE 20 MEQ: 782 TABLET, EFFERVESCENT ORAL at 08:10

## 2024-10-22 RX ADMIN — AMLODIPINE BESYLATE 5 MG: 5 TABLET ORAL at 08:10

## 2024-10-22 RX ADMIN — LEVETIRACETAM 250 MG: 100 INJECTION INTRAVENOUS at 21:54

## 2024-10-22 RX ADMIN — IPRATROPIUM BROMIDE AND ALBUTEROL SULFATE 1 DOSE: 2.5; .5 SOLUTION RESPIRATORY (INHALATION) at 00:25

## 2024-10-22 RX ADMIN — IPRATROPIUM BROMIDE AND ALBUTEROL SULFATE 1 DOSE: 2.5; .5 SOLUTION RESPIRATORY (INHALATION) at 12:25

## 2024-10-22 RX ADMIN — IPRATROPIUM BROMIDE AND ALBUTEROL SULFATE 1 DOSE: 2.5; .5 SOLUTION RESPIRATORY (INHALATION) at 21:49

## 2024-10-22 RX ADMIN — AMPICILLIN SODIUM AND SULBACTAM SODIUM 3000 MG: 2; 1 INJECTION, POWDER, FOR SOLUTION INTRAMUSCULAR; INTRAVENOUS at 00:49

## 2024-10-22 RX ADMIN — IPRATROPIUM BROMIDE AND ALBUTEROL SULFATE 1 DOSE: 2.5; .5 SOLUTION RESPIRATORY (INHALATION) at 04:15

## 2024-10-22 ASSESSMENT — PAIN SCALES - PAIN ASSESSMENT IN ADVANCED DEMENTIA (PAINAD)
CONSOLABILITY: NO NEED TO CONSOLE
BREATHING: NORMAL
FACIALEXPRESSION: SMILING OR INEXPRESSIVE
TOTALSCORE: 0
BODYLANGUAGE: RELAXED

## 2024-10-22 NOTE — PROGRESS NOTES
Comprehensive Nutrition Assessment    Type and Reason for Visit:  Reassess (TF)    Nutrition Recommendations/Plan:   Continue current TF regimen; current regimen is,      Standard w/ Fiber (Jevity 1.5) @ 45ml/hr + 1 pro mod x 23 hrs (holding 30 min before/after Synthroid) to provide 1035ml TV, 1553kcals, 66g pro (w/ 1 pro mod is 1657cals, 92g pro), 787ml free water.    Flush rec if needed is 145ml q 7=995od (1657ml total).    Will continue to monitor.        Malnutrition Assessment:  Malnutrition Status:  Severe malnutrition (10/16/24 1354)    Context:  Chronic Illness     Findings of the 6 clinical characteristics of malnutrition:  Energy Intake:  75% or less estimated energy requirements for 1 month or longer (2/2 advanced age; family at bedside report pt's appetite/oral intake PTA were \"okay\")  Weight Loss:  Unable to assess (d/t lack of wt hx per EMR)     Body Fat Loss:  Severe body fat loss Orbital, Triceps, Buccal region   Muscle Mass Loss:  Severe muscle mass loss Temples (temporalis), Clavicles (pectoralis & deltoids), Hand (interosseous)  Fluid Accumulation:  No significant fluid accumulation     Strength:  Not Performed    Nutrition Assessment:    pt adm d/t AMS/fall w/ scalp laceration and intracranial hemorrhage; no PMhx on file; SLP recommend soft/bite size solids and thin liquids on 10/12; pt s/p CT showing hemorrhage slightly enlarged; lethargy this adm noted; pt meets criteria for severe malnutrition; pt s/p MBSS 10/16, SLP rec NPO; family agreeable to PEG tube placement- pt s/p PEG placement 10/18; continue current TF; will continue to monitor.    Nutrition Related Findings:    responds to voice; oriented to person (oriented/disoriented at times); poor dentition; hypoactive BS; PEG w/ TF; Na/K WNL; +I/O Wound Type: Multiple (traumatic wounds x 2 noted per doc flow)       Current Nutrition Intake & Therapies:    Average Meal Intake: NPO  Average Supplements Intake: NPO  ADULT TUBE FEEDING; PEG;

## 2024-10-22 NOTE — PROGRESS NOTES
Special Procedures/Interventional Radiology Pre-Procedure Evaluation    Patient Name: Juan J Choudhury  MRN: [id number]  : 1940  Date of Procedure: 10/22/24  Planned Procedure: BL Thora    Is the patient alert, oriented, and capable of providing informed procedural consent? No  Are recent lab results within acceptable parameters to safely proceed with the procedure? No, new PT/INR result required    I have discussed and reviewed this information with the patient's primary RN. Plan to proceed with the procedure tomorrow starting with the right side. If tolerated, left side to be completed as well.. However, the exact timing of the procedure cannot be specified at this moment. There is also a possibility that the procedure may be rescheduled due to the department's caseload and prioritization of cases.

## 2024-10-22 NOTE — PROGRESS NOTES
Mihir Rodriges M.D.,[facility]  Servando Palma D.O., F.YESI., [facility]  Anyi Carlos M.D.  Teresa Swan M.D.   Jeremy Zhao D.O.  Jamal Laird M.D.         Daily Pulmonary Progress Note    Patient:  Juan J Choudhury 83 y.o. male MRN: [id number]            Synopsis     We are following patient for aspiration pneumonia, pleural effusions    \"CC\" Intracranial hemorrhage.     CODE STATUS:LIMITED CODE  Intubation/Re-intubation at the time of arrest No   Defibrillation/Cardioversion No   Chest Compressions No   Resuscitative Medications No       Subjective      Patient was seen and examined lying in bed, wife present at bedside. He looks more comfortable today and is more alert. He has a rash on his abdomen and upper thighs.      Review of Systems:  Unable to obtain     24-hour events:  No new events    Objective   OBJECTIVE:   /82   Pulse 87   Temp 97.2 °F (36.2 °C) (Temporal)   Resp 18   Ht 1.702 m (5' 7.01\")   Wt 61 kg (134 lb 7.7 oz)   SpO2 97%   BMI 21.06 kg/m²   SpO2 Readings from Last 1 Encounters:   10/22/24 97%        I/O:    Intake/Output Summary (Last 24 hours) at 10/22/2024 1406  Last data filed at 10/22/2024 0633  Gross per 24 hour   Intake 1030 ml   Output 1900 ml   Net -870 ml                      CURRENT MEDS :  Scheduled Meds:   potassium bicarb-citric acid  20 mEq Oral BID    ipratropium 0.5 mg-albuterol 2.5 mg  1 Dose Inhalation Q4H RT    levETIRAcetam  250 mg IntraVENous Q12H    amLODIPine  5 mg Oral Daily    [Held by provider] aspirin  81 mg Oral Daily    atorvastatin  80 mg Oral Nightly    finasteride  5 mg Oral Daily    levothyroxine  75 mcg Oral Daily    vitamin D  2,000 Units Oral Daily       Physical Exam:  General Appearance: appears comfortable in no acute distress.   HEENT: Dry mucous membranes   Neck: Lips, mucosa, and tongue normal.  Supple, symmetrical, trachea midline, no adenopathy;thyroid:  no enlargement/tenderness/nodules or JVD.  Lung: Breath sounds Coarse  interval follow-up CT chest with IV contrast for  assessment of the left lower lobe in the region of lung sutures.       Echo 10/14/2024:  Interpretation Summary          Left Ventricle: The left ventricle is normal in size with moderate increased thickness of endocardial surfaces.  Echocardiographic contrast was administered to enhance endocardial definition.  No regional wall motion abnormalities are identified with overall normal left ventricular systolic function.  An estimated ejection fraction of 55-60 % is calculated.  Indeterminate diastolic function is present    Tricuspid Valve: The tricuspid valve is suboptimally visualized with mild tricuspid regurgitation    Left Atrium: The left atrium is normal in size.  The interatrial septum is intact no evidence of an intracardiac shunt is identified on a suboptimal quality saline contrast administration including Valsalva    Pericardium: A small, hemodynamically insignificant pericardial effusions identified    Image quality is suboptimal. Contrast used: Definity. Technically difficult study with poor endocardial visualization and technically difficult study due to patient's body habitus.     Echo Findings     Left Ventricle The left ventricle is normal in size with moderate increased thickness of endocardial surfaces.  Echocardiographic contrast was administered to enhance endocardial definition.  No regional wall motion abnormalities are identified with overall normal left ventricular systolic function.  An estimated ejection fraction of 55-60 % is calculated.  Indeterminate diastolic function is present   Left Atrium The left atrium is normal in size.  The interatrial septum is intact no evidence of an intracardiac shunt is identified on a suboptimal quality saline contrast administration including Valsalva   Right Ventricle The right ventricle is normal in size with normal right ventricular function   Right Atrium The right atrium is normal in size   Aortic

## 2024-10-22 NOTE — PROGRESS NOTES
Occupational Therapy  OT BEDSIDE TREATMENT NOTE   [facility]  [address] [place], [place]       Date:10/22/2024  Patient Name: Juan J Choudhury  MRN: [id number]  : 1940  Room: 54[facility]-     Per OT Eval:    Evaluating OT: Castro Dillon OTR/L [id number]     Referring Provider: Margarito Ledesma MD                                 Specific Provider Orders/Date: OT evaluation and treatment 10/12/24 0945     Diagnosis:  Intracranial hemorrhage (HCC) [I62.9]  Injury of head, initial encounter [S09.90XA]  Fall, initial encounter [W19.XXXA]  Laceration of scalp, initial encounter [S01.01XA]       Pertinent Medical History:  has no past medical history on file.   Past Surgical History   No past surgical history on file.     Pt admitted to the hospital 10/10 with fall and AMS      Orders received, chart reviewed, eval complete.      Precautions:  Fall Risk, SPB<140, cognition, hx of dementia,      Assessment of current deficits   [x] Functional mobility             [x]ADLs           [x] Strength                  [x]Cognition   [x] Functional transfers           [] IADLs         [x] Safety Awareness   [x]Endurance   [] Fine Motor Coordination    [x] Balance      [] Vision/perception    []Sensation     [] Gross Motor Coordination [] ROM          [] Delirium                  [] Motor Control      OT PLAN OF CARE   OT POC based on physician orders, patient diagnosis and results of clinical assessment     Frequency/Duration 1-3 days/wk for 2 weeks PRN   Specific OT Treatment to include:   * Instruction/training on adapted ADL techniques and AE recommendations to increase functional independence within precautions       * Training on energy conservation strategies, correct breathing pattern and techniques to improve independence/tolerance for self-care routine  * Functional transfer/mobility training/DME recommendations for increased independence, safety,

## 2024-10-22 NOTE — PROGRESS NOTES
07/17/23    Called son updated in detail.    SYLVIA MABRY Son   [phone]     Jasmin Ryan MD     Continues with early AM hypoxia   Otherwise improving  Afebrile , vs stable  RRR with distant tones  Lungs sound more clear  Continue present regimen   Appreciate pulmonary input on this    · Hospitalized from 10/31/22 to 11/6/22 for right hip fracture - s/p ORIF  Postoperatively he developed urinary retention and he was discharged with a zavala  · Presented to the ED at [address] [place] [address] on 11/20/22 with lower abdominal pain - was noted to have a UTI and was discharged back to rehab on Augmentin  · Brought back to the ED at [address] [place] [address] on 11/21/22 with abdominal pain and lethargy  Noted to be hypotensive with lactic acid > 10, septic shock from UTI and was on pressors in the ICU  · Also noted to have hematuria and was on CBI  Hematuria persisted and developed abdominal distension with failure of CBI drainage  · CT abdomen (11/23/22) - "The Zavala catheter is malpositioned  The catheter balloon is located near the base of the penis, below the level of the prostate  There is gas within the soft tissues of the perineum and penis, most pronounced on the left    Urethral injury is suspected "  · Transferred emergently to [facility] and underwent suprapubic catheter placement on 11/23  · Hematuria resolved

## 2024-10-22 NOTE — PROGRESS NOTES
stenosis of the proximal right internal carotid artery. 4. Moderate to severe stenosis of the origin of the right vertebral artery. 5. 1.8 x 1.2 cm hypodense lesion in the right parotid gland. Further evaluation with pre and post-contrast enhanced MRI of the neck recommended. CTA HEAD: 1. Moderate stenosis in the distal M1 segment of the right middle cerebral artery. 2. Moderate stenoses in the A2 segment of the left anterior cerebral artery. 3. No significant stenosis in the posterior circulation.     CTA NECK W CONTRAST    Result Date: 10/10/2024  EXAMINATION: CTA OF THE NECK; CTA OF THE HEAD WITH CONTRAST 10/10/2024 9:01 pm: TECHNIQUE: CTA of the neck was performed with the administration of intravenous contrast. Multiplanar reformatted images are provided for review.  MIP images are provided for review. Stenosis of the internal carotid arteries measured using NASCET criteria. Automated exposure control, iterative reconstruction, and/or weight based adjustment of the mA/kV was utilized to reduce the radiation dose to as low as reasonably achievable.; CTA of the head/brain was performed with the administration of intravenous contrast. Multiplanar reformatted images are provided for review.  MIP images are provided for review. Automated exposure control, iterative reconstruction, and/or weight based adjustment of the mA/kV was utilized to reduce the radiation dose to as low as reasonably achievable. COMPARISON: None. HISTORY: ORDERING SYSTEM PROVIDED HISTORY: AMS + Fall; concern for CVA vs ICH TECHNOLOGIST PROVIDED HISTORY: Reason for exam:->AMS + Fall; concern for CVA vs ICH Has a \"code stroke\" or \"stroke alert\" been called?->Yes What reading provider will be dictating this exam?->CRC FINDINGS: CTA NECK: AORTIC ARCH/ARCH VESSELS: There is suggestion of high-grade stenosis along the origin of the left subclavian artery.  The region of the stenosis is obscured by beam hardening artifact from dense contrast in the

## 2024-10-22 NOTE — PROGRESS NOTES
Physical Therapy  Physical Therapy Treatment    Name: Juan J Choudhury  : 1940  MRN: [id number]      Date of Service: 10/22/2024    Evaluating PT:  Dolores Dominguez PT, DPT [id number]    Room #:  8506/8506-B  Diagnosis:  Intracranial hemorrhage (HCC) [I62.9]  Injury of head, initial encounter [S09.90XA]  Fall, initial encounter [W19.XXXA]  Laceration of scalp, initial encounter [S01.01XA]  PMHx/PSHx:   has a past medical history of Acute ischemic left JOSELYN stroke (HCC), CKD (chronic kidney disease), COPD (chronic obstructive pulmonary disease) (HCC), Hypothyroid, Lung cancer (HCC), Prostate cancer (HCC), Smoker, and Vascular dementia with behavior disturbance (HCC).   has a past surgical history that includes Lung removal, partial and Upper gastrointestinal endoscopy (N/A, 10/18/2024).  Procedure/Surgery:  EEG (10/16/2024), ESOPHAGOGASTRODUODENOSCOPY PERCUTANEOUS ENDOSCOPIC GASTROSTOMY TUBE INSERTION  10/18  Precautions:  Fall risk, alarms, cognition, Hx of dementia, L IPH, incontinent, lethargic, monitor HR, O2  Equipment Needs:  TBD    SUBJECTIVE:    Pt's home setup is unknown. He was unable to provide due to cognitive deficits.    OBJECTIVE:   Initial Evaluation  Date: 10/13/24 Treatment Date: 10/22/2024 Short Term/ Long Term   Goals   AM-PAC 6 Clicks     Was pt agreeable to Eval/treatment? Yes Yes    Does pt have pain? Denied None reported    Bed Mobility  Rolling: NT  Supine to sit: ModA  Sit to supine: NT  Scooting: ModA Rolling: MaxA  Supine to sit: MaxAx2  Sit to supine: MaxAx2  Scooting: MaxA Rolling: Independent  Supine to sit: Independent  Sit to supine: Independent  Scooting: Independent   Transfers Sit to stand: ModA  Stand to sit: ModA  Stand pivot: ModA with FWW Sit to stand: MaxAx2  Stand to sit: MaxAx2  Stand pivot: NT Sit to stand: Independent  Stand to sit: Independent  Stand pivot: SBA with AAD   Ambulation    70 feet x3 with FWW and ModA NT >400 feet with AAD and SBA   Stair negotiation:
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
## 2024-10-23 ENCOUNTER — APPOINTMENT (OUTPATIENT)
Dept: GENERAL RADIOLOGY | Age: 84
DRG: 040 | End: 2024-10-23
Payer: MEDICARE

## 2024-10-23 ENCOUNTER — APPOINTMENT (OUTPATIENT)
Dept: INTERVENTIONAL RADIOLOGY/VASCULAR | Age: 84
DRG: 040 | End: 2024-10-23
Payer: MEDICARE

## 2024-10-23 LAB
ALBUMIN SERPL-MCNC: 2.6 G/DL (ref 3.5–5.2)
ALP SERPL-CCNC: 97 U/L (ref 40–129)
ALT SERPL-CCNC: 58 U/L (ref 0–40)
ANION GAP SERPL CALCULATED.3IONS-SCNC: 10 MMOL/L (ref 7–16)
APPEARANCE FLD: NORMAL
AST SERPL-CCNC: 48 U/L (ref 0–39)
BASOPHILS # BLD: 0.03 K/UL (ref 0–0.2)
BASOPHILS NFR BLD: 0 % (ref 0–2)
BILIRUB SERPL-MCNC: 0.4 MG/DL (ref 0–1.2)
BODY FLD TYPE: NORMAL
BUN SERPL-MCNC: 18 MG/DL (ref 6–23)
CALCIUM SERPL-MCNC: 8.9 MG/DL (ref 8.6–10.2)
CHLORIDE SERPL-SCNC: 102 MMOL/L (ref 98–107)
CLOT CHECK: NORMAL
CO2 SERPL-SCNC: 25 MMOL/L (ref 22–29)
COLOR FLD: NORMAL
CREAT SERPL-MCNC: 1.2 MG/DL (ref 0.7–1.2)
EOSINOPHIL # BLD: 0.36 K/UL (ref 0.05–0.5)
EOSINOPHILS RELATIVE PERCENT: 4 % (ref 0–6)
ERYTHROCYTE [DISTWIDTH] IN BLOOD BY AUTOMATED COUNT: 12.8 % (ref 11.5–15)
GFR, ESTIMATED: 62 ML/MIN/1.73M2
GLUCOSE FLD-MCNC: 110 MG/DL
GLUCOSE SERPL-MCNC: 107 MG/DL (ref 74–99)
HCT VFR BLD AUTO: 35.7 % (ref 37–54)
HGB BLD-MCNC: 11.9 G/DL (ref 12.5–16.5)
IMM GRANULOCYTES # BLD AUTO: 0.07 K/UL (ref 0–0.58)
IMM GRANULOCYTES NFR BLD: 1 % (ref 0–5)
LDH FLD L TO P-CCNC: 109 U/L
LYMPHOCYTES NFR BLD: 0.5 K/UL (ref 1.5–4)
LYMPHOCYTES RELATIVE PERCENT: 6 % (ref 20–42)
MCH RBC QN AUTO: 29.2 PG (ref 26–35)
MCHC RBC AUTO-ENTMCNC: 33.3 G/DL (ref 32–34.5)
MCV RBC AUTO: 87.7 FL (ref 80–99.9)
MONOCYTES NFR BLD: 0.7 K/UL (ref 0.1–0.95)
MONOCYTES NFR BLD: 8 % (ref 2–12)
MONOCYTES NFR FLD: 24 %
NEUTROPHILS NFR BLD: 82 % (ref 43–80)
NEUTROPHILS NFR FLD: 76 %
NEUTS SEG NFR BLD: 7.44 K/UL (ref 1.8–7.3)
PLATELET # BLD AUTO: 414 K/UL (ref 130–450)
PMV BLD AUTO: 9.2 FL (ref 7–12)
POTASSIUM SERPL-SCNC: 3.4 MMOL/L (ref 3.5–5)
PROT FLD-MCNC: 2.1 G/DL
PROT SERPL-MCNC: 5.2 G/DL (ref 6.4–8.3)
RBC # BLD AUTO: 4.07 M/UL (ref 3.8–5.8)
RBC # BLD: ABNORMAL 10*6/UL
RBC # FLD: <2000 CELLS/UL
SODIUM SERPL-SCNC: 137 MMOL/L (ref 132–146)
SPECIMEN TYPE: NORMAL
WBC # FLD: 1847 CELLS/UL
WBC OTHER # BLD: 9.1 K/UL (ref 4.5–11.5)

## 2024-10-23 PROCEDURE — 36415 COLL VENOUS BLD VENIPUNCTURE: CPT

## 2024-10-23 PROCEDURE — 2500000003 HC RX 250 WO HCPCS: Performed by: INTERNAL MEDICINE

## 2024-10-23 PROCEDURE — 84157 ASSAY OF PROTEIN OTHER: CPT

## 2024-10-23 PROCEDURE — 83986 ASSAY PH BODY FLUID NOS: CPT

## 2024-10-23 PROCEDURE — 94640 AIRWAY INHALATION TREATMENT: CPT

## 2024-10-23 PROCEDURE — 92507 TX SP LANG VOICE COMM INDIV: CPT

## 2024-10-23 PROCEDURE — 88112 CYTOPATH CELL ENHANCE TECH: CPT

## 2024-10-23 PROCEDURE — 2580000003 HC RX 258: Performed by: INTERNAL MEDICINE

## 2024-10-23 PROCEDURE — 6370000000 HC RX 637 (ALT 250 FOR IP): Performed by: INTERNAL MEDICINE

## 2024-10-23 PROCEDURE — 6370000000 HC RX 637 (ALT 250 FOR IP): Performed by: PSYCHIATRY & NEUROLOGY

## 2024-10-23 PROCEDURE — 32555 ASPIRATE PLEURA W/ IMAGING: CPT

## 2024-10-23 PROCEDURE — 89051 BODY FLUID CELL COUNT: CPT

## 2024-10-23 PROCEDURE — 2500000003 HC RX 250 WO HCPCS: Performed by: PHYSICIAN ASSISTANT

## 2024-10-23 PROCEDURE — 0W9B3ZZ DRAINAGE OF LEFT PLEURAL CAVITY, PERCUTANEOUS APPROACH: ICD-10-PCS | Performed by: PHYSICIAN ASSISTANT

## 2024-10-23 PROCEDURE — 83615 LACTATE (LD) (LDH) ENZYME: CPT

## 2024-10-23 PROCEDURE — 88305 TISSUE EXAM BY PATHOLOGIST: CPT

## 2024-10-23 PROCEDURE — 0W993ZZ DRAINAGE OF RIGHT PLEURAL CAVITY, PERCUTANEOUS APPROACH: ICD-10-PCS | Performed by: PHYSICIAN ASSISTANT

## 2024-10-23 PROCEDURE — 2700000000 HC OXYGEN THERAPY PER DAY

## 2024-10-23 PROCEDURE — 82945 GLUCOSE OTHER FLUID: CPT

## 2024-10-23 PROCEDURE — 71045 X-RAY EXAM CHEST 1 VIEW: CPT

## 2024-10-23 PROCEDURE — C1729 CATH, DRAINAGE: HCPCS

## 2024-10-23 PROCEDURE — 80053 COMPREHEN METABOLIC PANEL: CPT

## 2024-10-23 PROCEDURE — 1200000000 HC SEMI PRIVATE

## 2024-10-23 PROCEDURE — 6360000002 HC RX W HCPCS: Performed by: NURSE PRACTITIONER

## 2024-10-23 PROCEDURE — 85025 COMPLETE CBC W/AUTO DIFF WBC: CPT

## 2024-10-23 PROCEDURE — 87070 CULTURE OTHR SPECIMN AEROBIC: CPT

## 2024-10-23 PROCEDURE — 87205 SMEAR GRAM STAIN: CPT

## 2024-10-23 RX ORDER — SODIUM CHLORIDE 0.9 % (FLUSH) 0.9 %
5-40 SYRINGE (ML) INJECTION 2 TIMES DAILY
Status: DISCONTINUED | OUTPATIENT
Start: 2024-10-23 | End: 2024-10-24 | Stop reason: HOSPADM

## 2024-10-23 RX ORDER — LIDOCAINE HYDROCHLORIDE 20 MG/ML
INJECTION, SOLUTION INFILTRATION; PERINEURAL PRN
Status: COMPLETED | OUTPATIENT
Start: 2024-10-23 | End: 2024-10-23

## 2024-10-23 RX ADMIN — IPRATROPIUM BROMIDE AND ALBUTEROL SULFATE 1 DOSE: 2.5; .5 SOLUTION RESPIRATORY (INHALATION) at 16:51

## 2024-10-23 RX ADMIN — IPRATROPIUM BROMIDE AND ALBUTEROL SULFATE 1 DOSE: 2.5; .5 SOLUTION RESPIRATORY (INHALATION) at 00:48

## 2024-10-23 RX ADMIN — SILVER SULFADIAZINE: 10 CREAM TOPICAL at 12:41

## 2024-10-23 RX ADMIN — IPRATROPIUM BROMIDE AND ALBUTEROL SULFATE 1 DOSE: 2.5; .5 SOLUTION RESPIRATORY (INHALATION) at 21:02

## 2024-10-23 RX ADMIN — LEVETIRACETAM 250 MG: 100 INJECTION INTRAVENOUS at 20:44

## 2024-10-23 RX ADMIN — FINASTERIDE 5 MG: 5 TABLET, FILM COATED ORAL at 20:43

## 2024-10-23 RX ADMIN — LEVETIRACETAM 250 MG: 100 INJECTION INTRAVENOUS at 10:14

## 2024-10-23 RX ADMIN — LIDOCAINE HYDROCHLORIDE 10 ML: 20 INJECTION, SOLUTION INFILTRATION; PERINEURAL at 10:53

## 2024-10-23 RX ADMIN — POTASSIUM BICARBONATE 20 MEQ: 782 TABLET, EFFERVESCENT ORAL at 20:43

## 2024-10-23 RX ADMIN — ATORVASTATIN CALCIUM 80 MG: 80 TABLET, FILM COATED ORAL at 20:44

## 2024-10-23 RX ADMIN — IPRATROPIUM BROMIDE AND ALBUTEROL SULFATE 1 DOSE: 2.5; .5 SOLUTION RESPIRATORY (INHALATION) at 03:41

## 2024-10-23 RX ADMIN — SODIUM CHLORIDE, PRESERVATIVE FREE 10 ML: 5 INJECTION INTRAVENOUS at 10:25

## 2024-10-23 RX ADMIN — SODIUM CHLORIDE, PRESERVATIVE FREE 10 ML: 5 INJECTION INTRAVENOUS at 20:44

## 2024-10-23 RX ADMIN — SILVER SULFADIAZINE: 10 CREAM TOPICAL at 20:56

## 2024-10-23 NOTE — PROGRESS NOTES
Mihir Rodriges M.D.,[facility]  Servando Palma D.O., [facility]ES[facility]., [facility]  Anyi Carlos M.D.  Teresa Swan M.D.   Jeremy Zhao D.O.  Jamal Laird M.D.         Daily Pulmonary Progress Note    Patient:  Juan J Choudhury 83 y.o. male MRN: [id number]            Synopsis     We are following patient for aspiration pneumonia, pleural effusions    \"CC\" Intracranial hemorrhage.     CODE STATUS:LIMITED CODE  Intubation/Re-intubation at the time of arrest No   Defibrillation/Cardioversion No   Chest Compressions No   Resuscitative Medications No       Subjective      Patient was seen and examined lying in bed, with staff present cleaning him up.  He just recently returned from interventional radiology.  Left thoracentesis was completed with 300 mL removed.  Right side was not done due to insufficient accumulation of fluid.    Review of Systems:  Unable to obtain     24-hour events:  No new events    Objective   OBJECTIVE:   /78   Pulse 84   Temp 98.2 °F (36.8 °C) (Temporal)   Resp 20   Ht 1.702 m (5' 7.01\")   Wt 61 kg (134 lb 7.7 oz)   SpO2 96%   BMI 21.06 kg/m²   SpO2 Readings from Last 1 Encounters:   10/23/24 96%        I/O:    Intake/Output Summary (Last 24 hours) at 10/23/2024 1521  Last data filed at 10/23/2024 0538  Gross per 24 hour   Intake 1242 ml   Output 1700 ml   Net -458 ml                      CURRENT MEDS :  Scheduled Meds:   sodium chloride flush  5-40 mL IntraVENous BID    silver sulfADIAZINE   Topical BID    potassium bicarb-citric acid  20 mEq Oral BID    ipratropium 0.5 mg-albuterol 2.5 mg  1 Dose Inhalation Q4H RT    levETIRAcetam  250 mg IntraVENous Q12H    amLODIPine  5 mg Oral Daily    [Held by provider] aspirin  81 mg Oral Daily    atorvastatin  80 mg Oral Nightly    finasteride  5 mg Oral Daily    levothyroxine  75 mcg Oral Daily    vitamin D  2,000 Units Oral Daily       Physical Exam:  General Appearance: appears comfortable in no acute distress.   HEENT: Dry mucous membranes

## 2024-10-23 NOTE — PROGRESS NOTES
1030-Patient arrived via bed with transport to Radiology department for b/l thora. Allergies, home medications, H&P and fasting instructions reviewed with patient. Vital signs taken.     Procedural instructions given, questions answered, understanding expressed and consent signed with patients spouse.

## 2024-10-23 NOTE — OP NOTE
Operative Note  ____________________________________________________________      IR U/S GUIDED THORACENTESIS  [facility] 5WE ORTHO-TRAUMA    Patient Name: Juan J Choudhury   YOB: 1940  Medical Record Number: [id number]  Date of Procedure: 10/23/24  Room/Bed: 5414/5414-B    Preoperative diagnosis: bilateral Pleural Effusion    Postoperative diagnosis:  Small right effusion insufficient for removal of fluid. Resolution of left pleural effusion.     Consent: The spouse via phone verified by 2 additional witnesses was counseled regarding the procedure, it's indications, risks, potential complications and alternatives and any questions were answered. Consent was obtained for image guided bilateral thoracentesis for Pleural effusion.     Procedure:  Image Guided left Thoracentesis.    Performed by: ETIENNE Carpio under on-site supervision by Iron Guevara MD.    Anesthesia: Local anesthesia with approximately 10 mL of 2% Lidocaine without epinephrine administered subcutaneously.    Estimated blood loss: Less than 10 mL    Complications: None    Specimen Obtained: Pleural Fluid    Procedure: Prior to start of procedure, routine scanning of the posterior thorax was performed using real-time ultrasound and revealed a very small right pleural effusion and a small to moderate left pleural effusion.  Decision made not to proceed with right thoracentesis at this time secondary to insufficient fluid present.  There was however sufficient fluid on the left to proceed with a left thoracentesis.   Decision was made to proceed with procedure.  After obtaining consent, a \"Time-out\" was called to verify the correct patient, procedure/location, allergies, relevant medications held for procedure and that all equipment is functioning and available. The patient was then situated in a seated upright position and the appropriate landmarks were identified using ultrasound. The site of maximum fluid collection was marked. The skin over  the puncture site in the left lower posterior hemithorax region was prepped with surgical skin prep and draped in a sterile fashion. Local anesthesia was administered by infiltration using 2% Lidocaine without epinephrine. A 6 Romanian safe-t-centesis catheter was then advanced into the pleural cavity and the needle was withdrawn.  Pleural fluid return was clear straw colored. The catheter was then withdrawn and a sterile dressing was placed over the site.  The patient tolerated the procedure well.     A total volume of  300mL was withdrawn    Complications: None.     Estimated Blood Loss: < 10cc.   .      Thank you for allowing Interventional Radiology to participate in the care of Juan J Choudhury.     Electronically signed by ETIENNE Carpio   DD: 10/23/24  3:19 PM

## 2024-10-23 NOTE — BRIEF OP NOTE
Brief-Op Note  ____________________________________________________________      IR  U/S GUIDED THORACENTESIS  [facility] ORTHO-TRAUMA    Patient Name: Juan J Choudhury   YOB: 1940  Medical Record Number: [id number]  Date of Procedure: 10/23/24  Room/Bed: 5414/5414-B    Preoperative diagnosis: bilateral Pleural Effusion    Postoperative diagnosis: Small right effusion insufficient for removal of fluid. Resolution of left pleural effusion.    Consent: The spouse via phone verified by 2 additional witnesses was counseled regarding the procedure, it's indications, risks, potential complications and alternatives and any questions were answered.     Procedure:  Image Guided left Thoracentesis.    Anesthesia: Local anesthesia.    Performed by: ETIENNE Carpio under on-site supervision by Iron Guevara MD.    Estimated blood loss: Less than 10 mL    Complications: None    Specimen Obtained: A total volume of  300mL was withdrawn    Electronically signed by ETIENNE Carpio   DD: 10/23/24  3:17 PM

## 2024-10-23 NOTE — CARE COORDINATION
Care Coordination   The plan is for the patient to go to [facility] upon discharge. Pt Ot evals in from yesterday have asked for a new precert to be started. [facility] envelope completed.

## 2024-10-23 NOTE — PROGRESS NOTES
Neurosurg progress note  VITALS:  /63   Pulse 90   Temp 97.2 °F (36.2 °C) (Temporal)   Resp 17   Ht 1.702 m (5' 7.01\")   Wt 61 kg (134 lb 7.7 oz)   SpO2 100%   BMI 21.06 kg/m²   24HR INTAKE/OUTPUT:    Intake/Output Summary (Last 24 hours) at 10/23/2024 0759  Last data filed at 10/23/2024 0538  Gross per 24 hour   Intake 1242 ml   Output 1700 ml   Net -458 ml     MRI BRAIN WO CONTRAST    Result Date: 10/11/2024  EXAMINATION: MRI OF THE BRAIN WITHOUT CONTRAST  10/11/2024 3:39 pm TECHNIQUE: Multiplanar multisequence MRI of the brain was performed without the administration of intravenous contrast. COMPARISON: None. HISTORY: ORDERING SYSTEM PROVIDED HISTORY: Concern for CVA, AMS GCS 14, expressive aphasia, fall with small L IPH TECHNOLOGIST PROVIDED HISTORY: Reason for exam:->Concern for CVA, AMS GCS 14, expressive aphasia, fall with small L IPH What is the sedation requirement?->None What reading provider will be dictating this exam?->CRC FINDINGS: INTRACRANIAL STRUCTURES/VENTRICLES: There is an acute or early subacute infarction extending from the left corona radiata into the superior basal ganglia and the periventricular white matter.  The infarction measures approximately 2.2 x 1.5 cm in the maximal axial plane. There is a small, 5 mm focus of acute or subacute hemorrhage in the left subinsular region.  No mass effect or midline shift.  Moderate generalized cerebral volume loss.  Severe chronic microvascular ischemic changes. No hydrocephalus or extra-axial fluid. ORBITS: Prosthetic lenses are seen in the globes bilaterally.  The orbits are otherwise grossly unremarkable. SINUSES: The visualized paranasal sinuses and mastoid air cells demonstrate no acute abnormality. BONES/SOFT TISSUES: The bone marrow signal intensity appears normal. The soft tissues demonstrate no acute abnormality.     1. Acute or early subacute INFARCTION extending from the left corona radiata into the superior basal ganglia and  04:32 AM    K 3.4 10/23/2024 04:32 AM     10/23/2024 04:32 AM    CO2 25 10/23/2024 04:32 AM    BUN 18 10/23/2024 04:32 AM    CREATININE 1.2 10/23/2024 04:32 AM    CALCIUM 8.9 10/23/2024 04:32 AM    LABGLOM 62 10/23/2024 04:32 AM    GLUCOSE 107 10/23/2024 04:32 AM      silver sulfADIAZINE   Topical BID    potassium bicarb-citric acid  20 mEq Oral BID    ipratropium 0.5 mg-albuterol 2.5 mg  1 Dose Inhalation Q4H RT    levETIRAcetam  250 mg IntraVENous Q12H    amLODIPine  5 mg Oral Daily    [Held by provider] aspirin  81 mg Oral Daily    atorvastatin  80 mg Oral Nightly    finasteride  5 mg Oral Daily    levothyroxine  75 mcg Oral Daily    vitamin D  2,000 Units Oral Daily     Open eyes to voice follows simple commands pleasantly confused baseline multi-infarct dementia unchanged  Assessment:  Patient Active Problem List   Diagnosis    Intracranial hemorrhage (HCC)    Head injury    Acute stroke due to ischemia (HCC)    Severe protein-calorie malnutrition (HCC)    Palliative care encounter     Plan: Full dose anticoagulation remains contraindicated till follow-up head CT shows complete resolution of the small intracranial hemorrhage continue current care  Adarsh Cabral MD M.D.

## 2024-10-23 NOTE — PROGRESS NOTES
SPEECH LANGUAGE PATHOLOGY  DAILY PROGRESS NOTE        PATIENT NAME:  Juan J Choudhury      ROOM:  5414/5414-B :  1940         TODAY'S DATE:  10/23/2024       Patient seen for cognitive linguistic deficits and dysphagia      Current Diet Order: Diet NPO  ADULT TUBE FEEDING; PEG; Standard with Fiber; Continuous; 25; Yes; 15; Q 4 hours; 45; 145; Q 6 hours; Protein; 1 Dose; Daily    Pt difficult to keep alert.   Orientation provided.   Unable to follow commands to participate for swallowing therapy.    Will continue

## 2024-10-23 NOTE — PLAN OF CARE
Problem: Safety - Adult  Goal: Free from fall injury  Outcome: Progressing     Problem: ABCDS Injury Assessment  Goal: Absence of physical injury  Outcome: Progressing     Problem: Skin/Tissue Integrity  Goal: Absence of new skin breakdown  Description: 1.  Monitor for areas of redness and/or skin breakdown  2.  Assess vascular access sites hourly  3.  Every 4-6 hours minimum:  Change oxygen saturation probe site  4.  Every 4-6 hours:  If on nasal continuous positive airway pressure, respiratory therapy assess nares and determine need for appliance change or resting period.  Outcome: Progressing     Problem: Neurosensory - Adult  Goal: Absence of seizures  Outcome: Progressing

## 2024-10-24 VITALS
HEIGHT: 67 IN | TEMPERATURE: 97 F | HEART RATE: 74 BPM | SYSTOLIC BLOOD PRESSURE: 111 MMHG | BODY MASS INDEX: 21.11 KG/M2 | DIASTOLIC BLOOD PRESSURE: 66 MMHG | WEIGHT: 134.48 LBS | OXYGEN SATURATION: 98 % | RESPIRATION RATE: 18 BRPM

## 2024-10-24 LAB
ALBUMIN SERPL-MCNC: 2.6 G/DL (ref 3.5–5.2)
ALP SERPL-CCNC: 104 U/L (ref 40–129)
ALT SERPL-CCNC: 73 U/L (ref 0–40)
ANION GAP SERPL CALCULATED.3IONS-SCNC: 8 MMOL/L (ref 7–16)
AST SERPL-CCNC: 61 U/L (ref 0–39)
BASOPHILS # BLD: 0.03 K/UL (ref 0–0.2)
BASOPHILS NFR BLD: 0 % (ref 0–2)
BILIRUB SERPL-MCNC: 0.5 MG/DL (ref 0–1.2)
BUN SERPL-MCNC: 26 MG/DL (ref 6–23)
CALCIUM SERPL-MCNC: 8.8 MG/DL (ref 8.6–10.2)
CHLORIDE SERPL-SCNC: 102 MMOL/L (ref 98–107)
CO2 SERPL-SCNC: 27 MMOL/L (ref 22–29)
CREAT SERPL-MCNC: 1.2 MG/DL (ref 0.7–1.2)
EOSINOPHIL # BLD: 0.34 K/UL (ref 0.05–0.5)
EOSINOPHILS RELATIVE PERCENT: 4 % (ref 0–6)
ERYTHROCYTE [DISTWIDTH] IN BLOOD BY AUTOMATED COUNT: 12.9 % (ref 11.5–15)
GFR, ESTIMATED: 58 ML/MIN/1.73M2
GLUCOSE SERPL-MCNC: 137 MG/DL (ref 74–99)
HCT VFR BLD AUTO: 34.8 % (ref 37–54)
HGB BLD-MCNC: 11.7 G/DL (ref 12.5–16.5)
IMM GRANULOCYTES # BLD AUTO: 0.08 K/UL (ref 0–0.58)
IMM GRANULOCYTES NFR BLD: 1 % (ref 0–5)
LYMPHOCYTES NFR BLD: 0.61 K/UL (ref 1.5–4)
LYMPHOCYTES RELATIVE PERCENT: 8 % (ref 20–42)
MCH RBC QN AUTO: 29.6 PG (ref 26–35)
MCHC RBC AUTO-ENTMCNC: 33.6 G/DL (ref 32–34.5)
MCV RBC AUTO: 88.1 FL (ref 80–99.9)
MONOCYTES NFR BLD: 0.65 K/UL (ref 0.1–0.95)
MONOCYTES NFR BLD: 8 % (ref 2–12)
NEUTROPHILS NFR BLD: 79 % (ref 43–80)
NEUTS SEG NFR BLD: 6.45 K/UL (ref 1.8–7.3)
NON-GYN CYTOLOGY REPORT: NORMAL
PLATELET # BLD AUTO: 444 K/UL (ref 130–450)
PMV BLD AUTO: 9.2 FL (ref 7–12)
POTASSIUM SERPL-SCNC: 3.6 MMOL/L (ref 3.5–5)
PROT SERPL-MCNC: 5.1 G/DL (ref 6.4–8.3)
RBC # BLD AUTO: 3.95 M/UL (ref 3.8–5.8)
REPORT STATUS: NORMAL
SODIUM SERPL-SCNC: 137 MMOL/L (ref 132–146)
WBC OTHER # BLD: 8.2 K/UL (ref 4.5–11.5)

## 2024-10-24 PROCEDURE — 6360000002 HC RX W HCPCS: Performed by: NURSE PRACTITIONER

## 2024-10-24 PROCEDURE — 2700000000 HC OXYGEN THERAPY PER DAY

## 2024-10-24 PROCEDURE — 36415 COLL VENOUS BLD VENIPUNCTURE: CPT

## 2024-10-24 PROCEDURE — 85025 COMPLETE CBC W/AUTO DIFF WBC: CPT

## 2024-10-24 PROCEDURE — 92526 ORAL FUNCTION THERAPY: CPT

## 2024-10-24 PROCEDURE — 94640 AIRWAY INHALATION TREATMENT: CPT

## 2024-10-24 PROCEDURE — 80053 COMPREHEN METABOLIC PANEL: CPT

## 2024-10-24 PROCEDURE — 6370000000 HC RX 637 (ALT 250 FOR IP): Performed by: INTERNAL MEDICINE

## 2024-10-24 PROCEDURE — 2500000003 HC RX 250 WO HCPCS: Performed by: INTERNAL MEDICINE

## 2024-10-24 PROCEDURE — 97129 THER IVNTJ 1ST 15 MIN: CPT

## 2024-10-24 PROCEDURE — 2580000003 HC RX 258: Performed by: INTERNAL MEDICINE

## 2024-10-24 RX ORDER — AMLODIPINE BESYLATE 5 MG/1
5 TABLET ORAL DAILY
Qty: 30 TABLET | Refills: 3
Start: 2024-10-25

## 2024-10-24 RX ORDER — ATORVASTATIN CALCIUM 80 MG/1
80 TABLET, FILM COATED ORAL NIGHTLY
Qty: 30 TABLET | Refills: 3 | Status: SHIPPED | OUTPATIENT
Start: 2024-10-24 | End: 2024-10-24

## 2024-10-24 RX ORDER — AMLODIPINE BESYLATE 5 MG/1
5 TABLET ORAL DAILY
Qty: 30 TABLET | Refills: 3 | Status: SHIPPED | OUTPATIENT
Start: 2024-10-25 | End: 2024-10-24

## 2024-10-24 RX ORDER — ATORVASTATIN CALCIUM 80 MG/1
80 TABLET, FILM COATED ORAL NIGHTLY
Qty: 30 TABLET | Refills: 3
Start: 2024-10-24

## 2024-10-24 RX ADMIN — SODIUM CHLORIDE, PRESERVATIVE FREE 10 ML: 5 INJECTION INTRAVENOUS at 08:33

## 2024-10-24 RX ADMIN — IPRATROPIUM BROMIDE AND ALBUTEROL SULFATE 1 DOSE: 2.5; .5 SOLUTION RESPIRATORY (INHALATION) at 09:53

## 2024-10-24 RX ADMIN — LEVOTHYROXINE SODIUM 75 MCG: 0.07 TABLET ORAL at 06:04

## 2024-10-24 RX ADMIN — LEVETIRACETAM 250 MG: 100 INJECTION INTRAVENOUS at 08:25

## 2024-10-24 RX ADMIN — POTASSIUM BICARBONATE 20 MEQ: 782 TABLET, EFFERVESCENT ORAL at 08:26

## 2024-10-24 RX ADMIN — SILVER SULFADIAZINE: 10 CREAM TOPICAL at 08:25

## 2024-10-24 RX ADMIN — AMLODIPINE BESYLATE 5 MG: 5 TABLET ORAL at 08:24

## 2024-10-24 RX ADMIN — IPRATROPIUM BROMIDE AND ALBUTEROL SULFATE 1 DOSE: 2.5; .5 SOLUTION RESPIRATORY (INHALATION) at 00:48

## 2024-10-24 RX ADMIN — IPRATROPIUM BROMIDE AND ALBUTEROL SULFATE 1 DOSE: 2.5; .5 SOLUTION RESPIRATORY (INHALATION) at 03:39

## 2024-10-24 RX ADMIN — Medication 2000 UNITS: at 08:25

## 2024-10-24 NOTE — PLAN OF CARE
Problem: Discharge Planning  Goal: Discharge to home or other facility with appropriate resources  Outcome: Progressing     Problem: Safety - Adult  Goal: Free from fall injury  10/24/2024 0022 by Kimo Adrian RN  Outcome: Progressing     Problem: ABCDS Injury Assessment  Goal: Absence of physical injury  10/24/2024 0022 by Kimo Adrian RN  Outcome: Progressing     Problem: Skin/Tissue Integrity  Goal: Absence of new skin breakdown  Description: 1.  Monitor for areas of redness and/or skin breakdown  2.  Assess vascular access sites hourly  3.  Every 4-6 hours minimum:  Change oxygen saturation probe site  4.  Every 4-6 hours:  If on nasal continuous positive airway pressure, respiratory therapy assess nares and determine need for appliance change or resting period.  10/24/2024 0022 by Kimo Adrian RN  Outcome: Progressing     Problem: Nutrition Deficit:  Goal: Optimize nutritional status  Outcome: Progressing     Problem: Neurosensory - Adult  Goal: Achieves stable or improved neurological status  Outcome: Progressing     Problem: Neurosensory - Adult  Goal: Absence of seizures  10/24/2024 0022 by Kimo Adrian RN  Outcome: Progressing     Problem: Neurosensory - Adult  Goal: Achieves maximal functionality and self care  Outcome: Progressing     Problem: Pain  Goal: Verbalizes/displays adequate comfort level or baseline comfort level  Outcome: Progressing

## 2024-10-24 NOTE — PROGRESS NOTES
SPEECH LANGUAGE PATHOLOGY  DAILY PROGRESS NOTE        PATIENT NAME:  Juan J Choudhury      ROOM:  5414/5414-B :  1940         TODAY'S DATE:  10/24/2024       Patient seen for cognitive linguistic deficits and dysphagia      DYSPHAGIA  Results and recommendations of swallowing evaluation reviewed.  Oral motor strength/ coordination exercises to improve bolus prep/ control and mastication were completed with  maximal verbal prompts .  BOTR strength/ ROM exercises to reduce pharyngeal residuals and improve epiglottic inversion were completed with maximal verbal prompts  Laryngeal strength/ ROM therapeutic exercises to improve airway protection for the least restrictive PO diet  were completed with maximal verbal prompts    COGNITION  Pt was oriented to Person  Recall of personal information required mod cues  Thought processing was delayed        Will continue

## 2024-10-24 NOTE — PROGRESS NOTES
Continues to improve   Much more responsive  Afebrile , vs stable   RRR  Lungs more clear   Discharge to [facility]

## 2024-10-24 NOTE — PROCEDURES
PROCEDURE NOTE  Date: 10/24/2024   Name: Juan J Choudhury  YOB: 1940    Procedures: Right Thoracentesis  Attempted to call floor twice and there was no answer. Will attempt again later.

## 2024-10-24 NOTE — PROGRESS NOTES
Called IR back and they stated that they will not be taking the patient down for the right thoracentesis because there wasn't enough fluid.

## 2024-10-24 NOTE — CARE COORDINATION
10/24/2024Social work transition of care planning  Pt plan is to [facility]. [facility] set up [facility] for 2 pm. [facility] updated rn,facility rep, and pt's spouse.  Electronically signed by YARIEL Tam on 10/24/2024 at 10:13 AM

## 2024-10-24 NOTE — PROGRESS NOTES
parietal scalp soft tissue injury.     1. Tiny hyperdense area in the left basal ganglia measuring 0.5 cm. In the setting of trauma, could be a small contusion. No significant mass effect or midline shift. 2. Right parietal scalp soft tissue injury. 3. Age related changes of brain volume loss and nonspecific white matter hypodensity most often ascribed to chronic small vessel ischemia.     CT CERVICAL SPINE WO CONTRAST    Result Date: 10/10/2024  EXAMINATION: CT OF THE CERVICAL SPINE WITHOUT CONTRAST 10/10/2024 9:01 pm TECHNIQUE: CT of the cervical spine was performed without the administration of intravenous contrast. Multiplanar reformatted images are provided for review. Automated exposure control, iterative reconstruction, and/or weight based adjustment of the mA/kV was utilized to reduce the radiation dose to as low as reasonably achievable. COMPARISON: None. HISTORY: ORDERING SYSTEM PROVIDED HISTORY: fall; concern for fx vs subluxation TECHNOLOGIST PROVIDED HISTORY: Reason for exam:->fall; concern for fx vs subluxation Decision Support Exception - unselect if not a suspected or confirmed emergency medical condition->Emergency Medical Condition (MA) What reading provider will be dictating this exam?->CRC FINDINGS: BONES/ALIGNMENT: There is no acute fracture or traumatic malalignment. DEGENERATIVE CHANGES: Multilevel degenerative spondylosis greatest at C5-6 and C6-7. SOFT TISSUES: There is no prevertebral soft tissue swelling.     No acute abnormality of the cervical spine.     CBC:   Lab Results   Component Value Date/Time    WBC 8.2 10/24/2024 04:56 AM    RBC 3.95 10/24/2024 04:56 AM    HGB 11.7 10/24/2024 04:56 AM    HCT 34.8 10/24/2024 04:56 AM    MCV 88.1 10/24/2024 04:56 AM    MCH 29.6 10/24/2024 04:56 AM    MCHC 33.6 10/24/2024 04:56 AM    RDW 12.9 10/24/2024 04:56 AM     10/24/2024 04:56 AM    MPV 9.2 10/24/2024 04:56 AM     BMP:    Lab Results   Component Value Date/Time     10/24/2024  04:56 AM    K 3.6 10/24/2024 04:56 AM     10/24/2024 04:56 AM    CO2 27 10/24/2024 04:56 AM    BUN 26 10/24/2024 04:56 AM    CREATININE 1.2 10/24/2024 04:56 AM    CALCIUM 8.8 10/24/2024 04:56 AM    LABGLOM 58 10/24/2024 04:56 AM    GLUCOSE 137 10/24/2024 04:56 AM      sodium chloride flush  5-40 mL IntraVENous BID    silver sulfADIAZINE   Topical BID    potassium bicarb-citric acid  20 mEq Oral BID    ipratropium 0.5 mg-albuterol 2.5 mg  1 Dose Inhalation Q4H RT    levETIRAcetam  250 mg IntraVENous Q12H    amLODIPine  5 mg Oral Daily    [Held by provider] aspirin  81 mg Oral Daily    atorvastatin  80 mg Oral Nightly    finasteride  5 mg Oral Daily    levothyroxine  75 mcg Oral Daily    vitamin D  2,000 Units Oral Daily     Opens eyes to voice seems more alert this morning pupils equal round reactive remains pleasantly demented  Assessment:  Patient Active Problem List   Diagnosis    Intracranial hemorrhage (HCC)    Head injury    Acute stroke due to ischemia (HCC)    Severe protein-calorie malnutrition (HCC)    Palliative care encounter     Plan: Await placement continue current care  Adarsh Cabral MD M.D.

## 2024-10-24 NOTE — PROGRESS NOTES
Dr Swan states she is okay with pt discharging today since the thoracentesis isn't being completed

## 2024-10-24 NOTE — CARE COORDINATION
10/24/2024social work transition of care planning  Pt plan is to [facility],once medically stable. Auth initiated yesterday. [facility] completed on 10/15 and envelope in soft chart.  Electronically signed by YARIEL Tam on 10/24/2024 at 9:02 AM    Addendum: auth obtained,good til 10/30.  Electronically signed by YARIEL Tam on 10/24/2024 at 9:34 AM    Addendum:Sw notified that pulm is ok with dc.  Electronically signed by YARIEL Tam on 10/24/2024 at 9:41 AM

## 2024-10-25 LAB
MICROORGANISM SPEC CULT: NO GROWTH
MICROORGANISM/AGENT SPEC: NORMAL
SPECIMEN DESCRIPTION: NORMAL

## 2024-10-28 NOTE — DISCHARGE SUMMARY
[facility]              [address] [place] [address]                            DISCHARGE SUMMARY      PATIENT NAME: GHADA YATES                 : 1940  MED REC NO: [id number]                        ROOM: 5414  ACCOUNT NO: [id number]                       ADMIT DATE: 10/10/2024  PROVIDER: Margarito Ledesma MD      HISTORY OF PRESENT ILLNESS:  The patient is an 83-year-old male with a complicated past medical history including but not limited to acute ischemic left hemispheric stroke, status post tPA on 2022, vascular dementia, history of lung cancer with subsequent laparoscopic resection at the [facility], active smoker, carotid artery stenosis, peripheral vascular disease, chronic obstructive pulmonary disease, chronic kidney disease, history of prostate carcinoma, hypothyroidism, who was found outside of his home by his family, lying on his back, unresponsive.  It is not exactly clear if he had a syncopal event and/or tripped and fell, but in either case, he was taken to emergency room where imaging studies demonstrated a small area of intracranial hemorrhage.  There were also concerns for possible concurrent stroke as well because of his past history.  The patient was unable to provide any details of events.    HOSPITAL COURSE:  He was seen by Neurosurgery.  He was started on intravenous Keppra.  He was also seen by Neurology and review of his imaging studies was undertaken.  The patient had a CT exam demonstrated a small left lateral ganglion hemorrhage.  CTA of head and neck demonstrated several areas of moderate stenosis plus 60% to 70% stenosis of proximal right ICA as well as high-grade stenosis of the left subclavian.  Subsequent MRI imaging demonstrated acute left subcortical stroke involving the left corona radiata down to the basal ganglia with a small region of hemorrhage along the left internal capsule.  The patient was

## 2024-11-01 ENCOUNTER — APPOINTMENT (OUTPATIENT)
Dept: CT IMAGING | Age: 84
End: 2024-11-01
Payer: OTHER GOVERNMENT

## 2024-11-01 ENCOUNTER — APPOINTMENT (OUTPATIENT)
Dept: GENERAL RADIOLOGY | Age: 84
End: 2024-11-01
Payer: OTHER GOVERNMENT

## 2024-11-01 ENCOUNTER — HOSPITAL ENCOUNTER (EMERGENCY)
Age: 84
Discharge: HOME | End: 2024-11-02
Attending: EMERGENCY MEDICINE
Payer: OTHER GOVERNMENT
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
## 2024-11-01 DIAGNOSIS — S01.01XA LACERATION OF SCALP, INITIAL ENCOUNTER: ICD-10-CM

## 2024-11-01 PROCEDURE — 99284 EMERGENCY DEPT VISIT MOD MDM: CPT

## 2024-11-01 PROCEDURE — 72125 CT NECK SPINE W/O DYE: CPT

## 2024-11-01 PROCEDURE — 12002 RPR S/N/AX/GEN/TRNK2.6-7.5CM: CPT

## 2024-11-01 PROCEDURE — 70450 CT HEAD/BRAIN W/O DYE: CPT

## 2024-11-01 PROCEDURE — 72170 X-RAY EXAM OF PELVIS: CPT

## 2024-11-01 PROCEDURE — 71045 X-RAY EXAM CHEST 1 VIEW: CPT

## 2024-11-01 RX ORDER — LIDOCAINE HYDROCHLORIDE 10 MG/ML
5 INJECTION, SOLUTION INFILTRATION; PERINEURAL ONCE
Status: DISCONTINUED | OUTPATIENT
Start: 2024-11-01 | End: 2024-11-02 | Stop reason: HOSPADM

## 2024-11-01 NOTE — ED NOTES
Spoke with Sharona with [facility] , updated ETA 1:00am . She states they tried to outsource and was unsuccessful .

## 2024-11-01 NOTE — ED NOTES
Walking down hallway, glanced into room 6. Saw patient at the end of his bed on the floor. Patient was bleeding from head, C-collar in place. Called for ED staff, once arrived patient picked up while maintaining C-spine precautions and put back in bed. Dr. Simpson made aware. Bedside sitter requested to nursing office. Vitals taken assessment performed by Andrez JULIEN. New orders received.

## 2024-11-01 NOTE — ED NOTES
Pt. Fell in room, found by Pantera RN, pt. Has laceration to head, bleeding controlled. Vitals updated, pt. Assessed, complains of no pain, alert x baseline.

## 2024-11-01 NOTE — ED PROVIDER NOTES
[facility] EMERGENCY DEPARTMENT  EMERGENCY DEPARTMENT ENCOUNTER        Pt Name: Juan J Choudhury  MRN: [id number]  Birthdate 1940  Date of evaluation: 11/1/2024  Provider: Pedro Trevizo DO  PCP: Margarito Ledesma MD  Note Started: 8:58 AM EDT 11/1/24    CHIEF COMPLAINT       Chief Complaint   Patient presents with    Fall     Rolled off bed. -hit head -thinners. No complaints. Hx ICH       HISTORY OF PRESENT ILLNESS: 1 or more Elements   History From: PATIENT     Limitations to history : None    Juan J Choudhury is a 83 y.o. male with prior history of CVA, intracranial hemorrhage arriving from [facility] after he had an unwitnessed fall where he rolled out of his bed.  The patient has no complaints.  He is usually AO X1 at baseline.  He is not on blood thinners.  He is unsure if he hit his head.  Patient is DNR-CC status but nurse practitioner Shaylee wanted him evaluated for intracranial bleeding.      Nursing Notes were all reviewed and agreed with or any disagreements were addressed in the HPI.    REVIEW OF SYSTEMS :      Review of Systems    POSITIVE (+): none  NEGATIVE (-): fevers, chills, nausea, vomiting, diarrhea, constipation, shortness of breath, chest pain, abdominal pain      SURGICAL HISTORY     Past Surgical History:   Procedure Laterality Date    ABDOMEN SURGERY      APPENDECTOMY      BALLOON ANGIOPLASTY, ARTERY  8/6/2014    [facility] 8x10 left common iliac Dr Farrell    BRONCHOSCOPY  10/27/2017    COLONOSCOPY  20106    HERNIA REPAIR Right     R inguinal    LUNG REMOVAL, PARTIAL      [facility]    THORACENTESIS Left 10/23/2024    300ml clear/yellow fluid removed.    TONSILLECTOMY      UPPER GASTROINTESTINAL ENDOSCOPY N/A 10/18/2024    ESOPHAGOGASTRODUODENOSCOPY PERCUTANEOUS ENDOSCOPIC GASTROSTOMY TUBE INSERTION performed by John Nava MD at [facility] ENDOSCOPY    VASCULAR SURGERY      stent left leg       CURRENTMEDICATIONS       Previous Medications  and What was discussed)  None      Social Determinants : None      Records Reviewed (source and summary):   Discharge summary from Dr Ledesma from 10/28/24  HOSPITAL COURSE: He was seen by Neurosurgery. He was started on intravenous Keppra. He was also seen by Neurology and review of his imaging studies was undertaken. The patient had a CT exam demonstrated a small left lateral ganglion hemorrhage. CTA of head and neck demonstrated several areas of moderate stenosis plus 60% to 70% stenosis of proximal right ICA as well as high-grade stenosis of the left subclavian.           I am the Primary Clinician of Record.    FINAL IMPRESSION      1. Fall from bed, initial encounter          DISPOSITION/PLAN     DISPOSITION Decision To Discharge 11/01/2024 12:32:08 PM           PATIENT REFERRED TO:  Margarito Ledesma MD  [address] [place] [address]  [phone]    Schedule an appointment as soon as possible for a visit   As needed      DISCHARGE MEDICATIONS:  New Prescriptions    No medications on file       DISCONTINUED MEDICATIONS:  Discontinued Medications    No medications on file              (Please note that portions of this note were completed with a voice recognition program.  Efforts were made to edit the dictations but occasionally words are mis-transcribed.)    Pedro Trevizo, DO PGY-2

## 2024-11-01 NOTE — DISCHARGE INSTRUCTIONS
Return to emergency room if you fall again or hit your head  You need to have the staples removed in the next 5-7 days

## 2024-11-01 NOTE — PROGRESS NOTES
Patient seen and examined rthis AM in the ED  Alert and responsive   Appears at his baseline  RRR with distant tones  Lungs clear with decreased BS  No new neurologic deficit  Would hope to get him back to [facility] [place] today if possible

## 2024-11-02 VITALS
DIASTOLIC BLOOD PRESSURE: 70 MMHG | BODY MASS INDEX: 21.03 KG/M2 | RESPIRATION RATE: 16 BRPM | SYSTOLIC BLOOD PRESSURE: 117 MMHG | WEIGHT: 134 LBS | HEIGHT: 67 IN | OXYGEN SATURATION: 99 % | HEART RATE: 78 BPM | TEMPERATURE: 98.6 F

## 2025-02-14 ENCOUNTER — APPOINTMENT (OUTPATIENT)
Dept: CT IMAGING | Age: 85
End: 2025-02-14
Payer: OTHER GOVERNMENT

## 2025-02-14 ENCOUNTER — HOSPITAL ENCOUNTER (EMERGENCY)
Age: 85
Discharge: HOME | End: 2025-02-14
Payer: OTHER GOVERNMENT

## 2025-02-14 VITALS
OXYGEN SATURATION: 100 % | WEIGHT: 135 LBS | BODY MASS INDEX: 21.14 KG/M2 | SYSTOLIC BLOOD PRESSURE: 149 MMHG | RESPIRATION RATE: 18 BRPM | HEART RATE: 81 BPM | TEMPERATURE: 97.9 F | DIASTOLIC BLOOD PRESSURE: 74 MMHG

## 2025-02-14 DIAGNOSIS — S01.01XA LACERATION OF SCALP, INITIAL ENCOUNTER: ICD-10-CM

## 2025-02-14 DIAGNOSIS — S09.90XA CLOSED HEAD INJURY, INITIAL ENCOUNTER: ICD-10-CM
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
## 2025-02-14 PROCEDURE — 12001 RPR S/N/AX/GEN/TRNK 2.5CM/<: CPT

## 2025-02-14 PROCEDURE — 72125 CT NECK SPINE W/O DYE: CPT

## 2025-02-14 PROCEDURE — 99284 EMERGENCY DEPT VISIT MOD MDM: CPT

## 2025-02-14 PROCEDURE — 70450 CT HEAD/BRAIN W/O DYE: CPT

## 2025-02-14 ASSESSMENT — PAIN - FUNCTIONAL ASSESSMENT: PAIN_FUNCTIONAL_ASSESSMENT: NONE - DENIES PAIN

## 2025-02-14 NOTE — ED PROVIDER NOTES
Independent ABIDA Visit.        [facility]  Department of Emergency Medicine   ED  Encounter Note  Admit Date/RoomTime: 2025  5:43 PM  ED Room:     NAME: Juan J Choudhury  : 1940  MRN: [id number]     Chief Complaint:  Fall (Unwitnessed fall, hit head, no thinners)    History of Present Illness         Juan J Choudhury is a 84 y.o. old male with a history of hypothyroidism, chronic kidney disease, prior CVA, who presents to the emergency department by ambulance, for head injury which occured shortly prior to arrival.  Per EMS report, the fall was unwitnessed and the patient struck the left side of his head and there was a laceration.  Nursing staff contacted the facility, states he has a history of dementia and he has been at his baseline since the fall.  Patient is currently pleasantly confused, however nursing staff stated that patient told them he was eating, dropped a carrot on the floor, bent forward to pick it up and fell out of his chair.  Patient intermittently answers questions appropriately, however as stated this is his baseline.  Patient is a DNR CC  ROS   Pertinent positives and negatives are stated within HPI, all other systems reviewed and are negative.    Past Medical History:  has a past medical history of Acute ischemic left JOSELYN stroke (HCC), Cancer (HCC), CKD (chronic kidney disease), COPD (chronic obstructive pulmonary disease) (HCC), Dehydration, Hypertension, Hypothyroid, Inguinal hernia, Lung cancer (HCC), Mediastinal adenopathy, Peripheral vascular disease (HCC), Prostate cancer (HCC), Smoker, Thyroid disease, and Vascular dementia with behavior disturbance (HCC).    Surgical History:  has a past surgical history that includes Abdomen surgery; Balloon angioplasty, artery (2014); hernia repair (Right); vascular surgery; Appendectomy; Tonsillectomy; Colonoscopy (); bronchoscopy (10/27/2017); Lung removal, partial; Upper gastrointestinal endoscopy (N/A,  Spoke with patient and she will call two days after she completes nicotine test.  Patient will be out of town until next week.

## 2025-02-15 NOTE — DISCHARGE INSTRUCTIONS
STAPLES CAN BE REMOVED IN 7 DAYS.  YOU CAN WASH YOUR HAIR BUT DO NOT SCRUB YOUR SCALP WHERE THE STAPLES ARE.  RETURN FOR WORSENING SYMPTOMS/CONCERNS.

## 2025-05-10 ENCOUNTER — HOSPITAL ENCOUNTER (EMERGENCY)
Age: 85
Discharge: HOME | End: 2025-05-10
Attending: STUDENT IN AN ORGANIZED HEALTH CARE EDUCATION/TRAINING PROGRAM
Payer: OTHER GOVERNMENT

## 2025-05-10 ENCOUNTER — APPOINTMENT (OUTPATIENT)
Dept: CT IMAGING | Age: 85
End: 2025-05-10
Payer: OTHER GOVERNMENT

## 2025-05-10 ENCOUNTER — APPOINTMENT (OUTPATIENT)
Dept: GENERAL RADIOLOGY | Age: 85
End: 2025-05-10
Payer: OTHER GOVERNMENT

## 2025-05-10 VITALS
TEMPERATURE: 97.8 F | OXYGEN SATURATION: 100 % | RESPIRATION RATE: 13 BRPM | SYSTOLIC BLOOD PRESSURE: 103 MMHG | HEART RATE: 58 BPM | WEIGHT: 145 LBS | DIASTOLIC BLOOD PRESSURE: 66 MMHG | HEIGHT: 70 IN | BODY MASS INDEX: 20.76 KG/M2

## 2025-05-10 DIAGNOSIS — R42 LIGHTHEADEDNESS: ICD-10-CM

## 2025-05-10 DIAGNOSIS — S09.90XA CLOSED HEAD INJURY, INITIAL ENCOUNTER: ICD-10-CM

## 2025-05-10 DIAGNOSIS — R29.6 UNWITNESSED FALL: Primary | ICD-10-CM

## 2025-05-10 DIAGNOSIS — J18.9 PNEUMONIA OF RIGHT UPPER LOBE DUE TO INFECTIOUS ORGANISM: ICD-10-CM

## 2025-05-10 LAB
ALBUMIN SERPL-MCNC: 3.9 G/DL (ref 3.5–5.2)
ALP SERPL-CCNC: 199 U/L (ref 40–129)
ALT SERPL-CCNC: 29 U/L (ref 0–40)
ANION GAP SERPL CALCULATED.3IONS-SCNC: 12 MMOL/L (ref 7–16)
AST SERPL-CCNC: 28 U/L (ref 0–39)
BASOPHILS # BLD: 0.06 K/UL (ref 0–0.2)
BASOPHILS NFR BLD: 1 % (ref 0–2)
BILIRUB SERPL-MCNC: 0.3 MG/DL (ref 0–1.2)
BILIRUB UR QL STRIP: NEGATIVE
BUN SERPL-MCNC: 21 MG/DL (ref 6–23)
CALCIUM SERPL-MCNC: 9.6 MG/DL (ref 8.6–10.2)
CHLORIDE SERPL-SCNC: 103 MMOL/L (ref 98–107)
CLARITY UR: CLEAR
CO2 SERPL-SCNC: 24 MMOL/L (ref 22–29)
COLOR UR: YELLOW
CREAT SERPL-MCNC: 1 MG/DL (ref 0.7–1.2)
EKG ATRIAL RATE: 60 BPM
EKG P AXIS: 11 DEGREES
EKG P-R INTERVAL: 140 MS
EKG Q-T INTERVAL: 440 MS
EKG QRS DURATION: 88 MS
EKG QTC CALCULATION (BAZETT): 440 MS
EKG R AXIS: -6 DEGREES
EKG T AXIS: 49 DEGREES
EKG VENTRICULAR RATE: 60 BPM
EOSINOPHIL # BLD: 0.57 K/UL (ref 0.05–0.5)
EOSINOPHILS RELATIVE PERCENT: 6 % (ref 0–6)
ERYTHROCYTE [DISTWIDTH] IN BLOOD BY AUTOMATED COUNT: 14.6 % (ref 11.5–15)
GFR, ESTIMATED: 74 ML/MIN/1.73M2
GLUCOSE SERPL-MCNC: 108 MG/DL (ref 74–99)
GLUCOSE UR STRIP-MCNC: NEGATIVE MG/DL
HCT VFR BLD AUTO: 40 % (ref 37–54)
HGB BLD-MCNC: 12.6 G/DL (ref 12.5–16.5)
HGB UR QL STRIP.AUTO: NEGATIVE
IMM GRANULOCYTES # BLD AUTO: 0.04 K/UL (ref 0–0.58)
IMM GRANULOCYTES NFR BLD: 0 % (ref 0–5)
INR PPP: 1
KETONES UR STRIP-MCNC: NEGATIVE MG/DL
LACTATE BLDV-SCNC: 1.1 MMOL/L (ref 0.5–1.9)
LEUKOCYTE ESTERASE UR QL STRIP: NEGATIVE
LYMPHOCYTES NFR BLD: 1.43 K/UL (ref 1.5–4)
LYMPHOCYTES RELATIVE PERCENT: 16 % (ref 20–42)
MCH RBC QN AUTO: 28.3 PG (ref 26–35)
MCHC RBC AUTO-ENTMCNC: 31.5 G/DL (ref 32–34.5)
MCV RBC AUTO: 89.9 FL (ref 80–99.9)
MONOCYTES NFR BLD: 1.15 K/UL (ref 0.1–0.95)
MONOCYTES NFR BLD: 13 % (ref 2–12)
NEUTROPHILS NFR BLD: 65 % (ref 43–80)
NEUTS SEG NFR BLD: 5.98 K/UL (ref 1.8–7.3)
NITRITE UR QL STRIP: NEGATIVE
PH UR STRIP: 7.5 [PH] (ref 5–8)
PLATELET # BLD AUTO: 334 K/UL (ref 130–450)
PMV BLD AUTO: 8.9 FL (ref 7–12)
POTASSIUM SERPL-SCNC: 4.3 MMOL/L (ref 3.5–5)
PROT SERPL-MCNC: 6.6 G/DL (ref 6.4–8.3)
PROT UR STRIP-MCNC: NEGATIVE MG/DL
PROTHROMBIN TIME: 10.6 SEC (ref 9.3–12.4)
RBC # BLD AUTO: 4.45 M/UL (ref 3.8–5.8)
RBC #/AREA URNS HPF: ABNORMAL /HPF
SODIUM SERPL-SCNC: 139 MMOL/L (ref 132–146)
SP GR UR STRIP: <1.005 (ref 1–1.03)
TROPONIN I SERPL HS-MCNC: 31 NG/L (ref 0–22)
TROPONIN I SERPL HS-MCNC: 37 NG/L (ref 0–22)
UROBILINOGEN UR STRIP-ACNC: 0.2 EU/DL (ref 0–1)
WBC #/AREA URNS HPF: ABNORMAL /HPF
WBC OTHER # BLD: 9.2 K/UL (ref 4.5–11.5)

## 2025-05-10 PROCEDURE — 70450 CT HEAD/BRAIN W/O DYE: CPT

## 2025-05-10 PROCEDURE — 71045 X-RAY EXAM CHEST 1 VIEW: CPT

## 2025-05-10 PROCEDURE — 83605 ASSAY OF LACTIC ACID: CPT

## 2025-05-10 PROCEDURE — 93005 ELECTROCARDIOGRAM TRACING: CPT | Performed by: STUDENT IN AN ORGANIZED HEALTH CARE EDUCATION/TRAINING PROGRAM

## 2025-05-10 PROCEDURE — 85025 COMPLETE CBC W/AUTO DIFF WBC: CPT

## 2025-05-10 PROCEDURE — 2580000003 HC RX 258: Performed by: STUDENT IN AN ORGANIZED HEALTH CARE EDUCATION/TRAINING PROGRAM

## 2025-05-10 PROCEDURE — 87040 BLOOD CULTURE FOR BACTERIA: CPT

## 2025-05-10 PROCEDURE — 87086 URINE CULTURE/COLONY COUNT: CPT

## 2025-05-10 PROCEDURE — 81001 URINALYSIS AUTO W/SCOPE: CPT

## 2025-05-10 PROCEDURE — 80053 COMPREHEN METABOLIC PANEL: CPT

## 2025-05-10 PROCEDURE — 99285 EMERGENCY DEPT VISIT HI MDM: CPT

## 2025-05-10 PROCEDURE — 84484 ASSAY OF TROPONIN QUANT: CPT

## 2025-05-10 PROCEDURE — 6370000000 HC RX 637 (ALT 250 FOR IP): Performed by: STUDENT IN AN ORGANIZED HEALTH CARE EDUCATION/TRAINING PROGRAM

## 2025-05-10 PROCEDURE — 85610 PROTHROMBIN TIME: CPT

## 2025-05-10 RX ORDER — CEFDINIR 300 MG/1
300 CAPSULE ORAL 2 TIMES DAILY
Qty: 14 CAPSULE | Refills: 0 | Status: SHIPPED | OUTPATIENT
Start: 2025-05-10 | End: 2025-05-10

## 2025-05-10 RX ORDER — DOXYCYCLINE 100 MG/1
100 CAPSULE ORAL ONCE
Status: COMPLETED | OUTPATIENT
Start: 2025-05-10 | End: 2025-05-10

## 2025-05-10 RX ORDER — 0.9 % SODIUM CHLORIDE 0.9 %
1000 INTRAVENOUS SOLUTION INTRAVENOUS ONCE
Status: COMPLETED | OUTPATIENT
Start: 2025-05-10 | End: 2025-05-10

## 2025-05-10 RX ORDER — CEFDINIR 300 MG/1
300 CAPSULE ORAL 2 TIMES DAILY
Qty: 14 CAPSULE | Refills: 0 | Status: SHIPPED | OUTPATIENT
Start: 2025-05-10 | End: 2025-05-17

## 2025-05-10 RX ORDER — CEFDINIR 300 MG/1
300 CAPSULE ORAL ONCE
Status: COMPLETED | OUTPATIENT
Start: 2025-05-10 | End: 2025-05-10

## 2025-05-10 RX ORDER — DOXYCYCLINE HYCLATE 100 MG
100 TABLET ORAL 2 TIMES DAILY
Qty: 14 TABLET | Refills: 0 | Status: SHIPPED | OUTPATIENT
Start: 2025-05-10 | End: 2025-05-17

## 2025-05-10 RX ORDER — DOXYCYCLINE HYCLATE 100 MG
100 TABLET ORAL 2 TIMES DAILY
Qty: 14 TABLET | Refills: 0 | Status: SHIPPED | OUTPATIENT
Start: 2025-05-10 | End: 2025-05-10

## 2025-05-10 RX ADMIN — SODIUM CHLORIDE 1000 ML: 0.9 INJECTION, SOLUTION INTRAVENOUS at 02:00

## 2025-05-10 RX ADMIN — CEFDINIR 300 MG: 300 CAPSULE ORAL at 05:03

## 2025-05-10 RX ADMIN — DOXYCYCLINE HYCLATE 100 MG: 100 CAPSULE ORAL at 05:03

## 2025-05-10 ASSESSMENT — ENCOUNTER SYMPTOMS
SHORTNESS OF BREATH: 0
NAUSEA: 0
ABDOMINAL PAIN: 0
DIARRHEA: 0
COUGH: 0
VOMITING: 0
CONSTIPATION: 0

## 2025-05-10 ASSESSMENT — PAIN - FUNCTIONAL ASSESSMENT: PAIN_FUNCTIONAL_ASSESSMENT: NONE - DENIES PAIN

## 2025-05-10 NOTE — ED NOTES
Assumed care of patient at 7:30. Introduced self to patient as RN. Repositioned patient for comfort. Awaiting transportation back to facility. Call light within reach. Bed alarm on

## 2025-05-10 NOTE — DISCHARGE INSTRUCTIONS
Please return to the ER for any new or worsening symptoms  If prescribed, please be sure to  your prescriptions from the pharmacy  Please follow-up with Primary care provider as instructed    XR CHEST PORTABLE   Final Result   Mild right apical airspace opacity, cannot exclude developing consolidation.         CT HEAD WO CONTRAST   Final Result   No acute intracranial abnormality.

## 2025-05-10 NOTE — ED NOTES
Patient found standing at end of bed with bed alarm going. Patient stated he needed to use the bathroom. Patient provided with urinal and this RN assisted patient at end of bed to use bathroom. Clean linens placed on bed with new jonathon pad. Patient assisted back to bed. Bed alarm on. Patient provided with pudding and applesauce.

## 2025-05-10 NOTE — PROGRESS NOTES
05/10/25  8:58 AM    Patient ready for pickup for discharge, previously printed scripts were not signed they were reprinted and signed at this time    Electronically signed by Walt Alberto DO on 5/10/2025 at 8:59 AM

## 2025-05-10 NOTE — ED PROVIDER NOTES
[facility] EMERGENCY DEPARTMENT  EMERGENCY DEPARTMENT ENCOUNTER        Pt Name: Juan J Choudhury  MRN: [id number]  Birthdate 1940  Date of evaluation: 5/10/2025  Provider: Jo Horne DO  PCP: Everett Swanson DO  Note Started: 3:17 AM EDT 5/10/25    CHIEF COMPLAINT       Chief Complaint   Patient presents with    Fall     Unwitnessed fall at 1630 today, staff states he hit the back of his head on a window ledge - no obvious sign of injury, pt states he doesn't know why he's here but he knows where he's at; denies pain, remembers falling but doesn't know why he fell; baseline AOx2, same at this time; facility states his temp was 101 at 0000 but they did not medicate him - temp normal in triage       HISTORY OF PRESENT ILLNESS: 1 or more Elements     Limitations to history : None    Juan J Choudhury is a 84 y.o. male with medical history of hypothyroidism, PVD, prior history of CVA, ICH, COPD, HTN, presents to the ED for evaluation of unwitnessed fall.  Apparently the patient is alert and oriented x 2 at baseline.  Right now he is alert, oriented to self, place, year, somewhat to purpose.  Patient states that he did fall today.  He states he does not know why.  He thinks maybe he got lightheaded.  He does not know if he lost consciousness.  He thinks he might of hit his head.  Overall he is not felt to be a very reliable historian.  Additionally, at the facility they state his temperature was 101 °F at midnight but they did not medicate him, and now he has no fever.  Patient states that he does not know that he had a fever.  He denies any cough, shortness of breath, chest pain or palpitations.  He denies any numbness or weakness anywhere.  Patient is unsure what he was doing when he fell, he thinks maybe he was getting up to go to the bathroom but does not really know.  He denies any abdominal pain, nausea vomiting diarrhea or abnormal urinary symptoms.  Denies any neck pain

## 2025-05-11 LAB
MICROORGANISM SPEC CULT: NO GROWTH
MICROORGANISM SPEC CULT: NORMAL
MICROORGANISM SPEC CULT: NORMAL
SERVICE CMNT-IMP: NORMAL
SPECIMEN DESCRIPTION: NORMAL

## 2025-05-12 LAB
EKG ATRIAL RATE: 60 BPM
EKG P AXIS: 11 DEGREES
EKG P-R INTERVAL: 140 MS
EKG Q-T INTERVAL: 440 MS
EKG QRS DURATION: 88 MS
EKG QTC CALCULATION (BAZETT): 440 MS
EKG R AXIS: -6 DEGREES
EKG T AXIS: 49 DEGREES
EKG VENTRICULAR RATE: 60 BPM

## 2025-05-12 PROCEDURE — 93010 ELECTROCARDIOGRAM REPORT: CPT | Performed by: INTERNAL MEDICINE

## 2025-05-15 LAB
MICROORGANISM SPEC CULT: NORMAL
MICROORGANISM SPEC CULT: NORMAL
SERVICE CMNT-IMP: NORMAL
SERVICE CMNT-IMP: NORMAL
SPECIMEN DESCRIPTION: NORMAL
SPECIMEN DESCRIPTION: NORMAL

## 2025-06-28 ENCOUNTER — APPOINTMENT (OUTPATIENT)
Dept: CT IMAGING | Age: 85
End: 2025-06-28
Payer: OTHER GOVERNMENT

## 2025-06-28 ENCOUNTER — HOSPITAL ENCOUNTER (EMERGENCY)
Age: 85
Discharge: HOME | End: 2025-06-28
Attending: EMERGENCY MEDICINE
Payer: OTHER GOVERNMENT

## 2025-06-28 VITALS
RESPIRATION RATE: 16 BRPM | HEIGHT: 70 IN | TEMPERATURE: 98.5 F | OXYGEN SATURATION: 96 % | SYSTOLIC BLOOD PRESSURE: 118 MMHG | BODY MASS INDEX: 19.33 KG/M2 | DIASTOLIC BLOOD PRESSURE: 72 MMHG | HEART RATE: 80 BPM | WEIGHT: 135 LBS

## 2025-06-28 DIAGNOSIS — S01.01XA LACERATION OF SCALP, INITIAL ENCOUNTER: ICD-10-CM

## 2025-06-28 DIAGNOSIS — S09.90XA INJURY OF HEAD, INITIAL ENCOUNTER: Primary | ICD-10-CM
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
## 2025-06-28 PROCEDURE — 6360000002 HC RX W HCPCS: Performed by: EMERGENCY MEDICINE

## 2025-06-28 PROCEDURE — 70450 CT HEAD/BRAIN W/O DYE: CPT

## 2025-06-28 PROCEDURE — 99284 EMERGENCY DEPT VISIT MOD MDM: CPT

## 2025-06-28 PROCEDURE — 12002 RPR S/N/AX/GEN/TRNK2.6-7.5CM: CPT

## 2025-06-28 PROCEDURE — 90714 TD VACC NO PRESV 7 YRS+ IM: CPT | Performed by: EMERGENCY MEDICINE

## 2025-06-28 PROCEDURE — 6360000002 HC RX W HCPCS

## 2025-06-28 PROCEDURE — 72125 CT NECK SPINE W/O DYE: CPT

## 2025-06-28 PROCEDURE — 90471 IMMUNIZATION ADMIN: CPT | Performed by: EMERGENCY MEDICINE

## 2025-06-28 RX ORDER — LIDOCAINE HYDROCHLORIDE 10 MG/ML
5 INJECTION, SOLUTION INFILTRATION; PERINEURAL ONCE
Status: COMPLETED | OUTPATIENT
Start: 2025-06-28 | End: 2025-06-28

## 2025-06-28 RX ADMIN — LIDOCAINE HYDROCHLORIDE 5 ML: 10 INJECTION, SOLUTION INFILTRATION; PERINEURAL at 23:11

## 2025-06-28 RX ADMIN — CLOSTRIDIUM TETANI TOXOID ANTIGEN (FORMALDEHYDE INACTIVATED) AND CORYNEBACTERIUM DIPHTHERIAE TOXOID ANTIGEN (FORMALDEHYDE INACTIVATED) 0.5 ML: 5; 2 INJECTION, SUSPENSION INTRAMUSCULAR at 22:15

## 2025-06-28 ASSESSMENT — PAIN - FUNCTIONAL ASSESSMENT: PAIN_FUNCTIONAL_ASSESSMENT: NONE - DENIES PAIN

## 2025-06-29 NOTE — DISCHARGE INSTRUCTIONS
Please ensure patient's staples are removed from 7 to 14 days.    CT Head W/O Contrast   Final Result   No acute intracranial abnormality.      Left posterior parietal scalp hematoma.  RECOMMENDATIONS:         CT CSpine W/O Contrast   Final Result   No acute abnormality of the cervical spine.           Suture Removal    Sutures, or stitches, are used to close cuts and wounds on the skin. Sutures need to be removed after your wound has healed.    Sutures may need to be removed in 3 to 14 days depending on the location of your wound.  INSTRUCTIONS:  Care for your wound:        Clean your wound as directed. Carefully wash your wound with soap and water. Pat the area dry with a clean towel.        Protect your wound. Your wound can swell, bleed, or split open if it is stretched or bumped. You may need to wear a bandage that supports your wound until it is completely healed.        Minimize your scar. Use sunblock if your wound is exposed to the sun. Apply it every day after the sutures are removed. This will help prevent skin discoloration.    Follow up with your healthcare provider as directed:    You may need to return in 3 to 5 days if the sutures are on your face. Sutures on your scalp need to be removed after 7 to 14 days. Sutures over joints may remain in place up to 14 days. Write down your questions so you remember to ask them during your visits.  Contact your healthcare provider if:        You have a fever and chills.        Your wound is red, warm, swollen, or leaking pus.        There is a bad smell coming from your wound.        You have increased pain in the wound area.        You have questions or concerns about your condition or care.    Return to the emergency department if:        Your wound splits open.        You suddenly cannot move your injured joint.        You have sudden numbness around your wound.        You see red streaks coming from your wound.

## 2025-06-29 NOTE — ED PROVIDER NOTES
[facility] EMERGENCY DEPARTMENT  EMERGENCY DEPARTMENT ENCOUNTER      Pt Name: Juan J Choudhury  MRN: [id number]  Birthdate 1940  Date of evaluation: 6/28/2025  Provider: Timothy Denney MD  PCP: Everett Swanson DO  Note Started: 11:27 PM EDT 6/28/25    CHIEF COMPLAINT       Chief Complaint   Patient presents with    Fall     Unwitnessed fall from wheelchair.  Laceration on back of head.       HISTORY OF PRESENT ILLNESS: 1 or more Elements   History From: Patient, EMS, facility report  Limitations to history : Baseline dementia    Juan J Choudhury is a 84 y.o. male who presents BIBEMS from long-term care facility where he resides status post reported unwitnessed mechanical fall from wheelchair.  Patient with baseline Alzheimer's dementia considered to be poor historian, arrives alert to self in no acute distress.  Apparently per facility report had unbelted himself from wheelchair and fallen out of it.  Arrives with laceration to the back of said.  DNR CC per documentation provided, not on anticoagulation.  Patient currently denies nausea, vomiting, objective fevers or chills, chest pain or pressure, shortness of breath, abdominal pain, dysuria.  Moves all extremities, denies numbness, tingling, bowel or bladder incontinence, saddle anesthesia.    Nursing Notes were all reviewed and agreed with or any disagreements were addressed in the HPI.    REVIEW OF SYSTEMS :    Positives and Pertinent negatives as per HPI.     PAST MEDICAL HISTORY/Chronic Conditions Affecting Care    has a past medical history of Acute ischemic left JOSELYN stroke ([facility]) (2022), Cancer (HCC) (2014), CKD (chronic kidney disease), COPD (chronic obstructive pulmonary disease) ([facility]), Dehydration (11/10/2019), Hypertension, Hypothyroid, Inguinal hernia, Lung cancer (HCC), Mediastinal adenopathy (10/2017), Peripheral vascular disease, Prostate cancer (HCC), Smoker, Thyroid disease, and Vascular dementia with behavior

## (undated) DEVICE — BITEBLOCK 54FR W/ DENT RIM BLOX

## (undated) DEVICE — GAUZE,SPONGE,4"X4",8PLY,STRL,LF,10/TRAY: Brand: MEDLINE

## (undated) DEVICE — DEFENDO AIR WATER SUCTION AND BIOPSY VALVE KIT FOR  OLYMPUS: Brand: DEFENDO AIR/WATER/SUCTION AND BIOPSY VALVE

## (undated) DEVICE — Device

## (undated) DEVICE — BINDER ABD SM M W12IN CIRC 30 45IN 4 PNL E CNTCT CLSR POSTOP